# Patient Record
Sex: FEMALE | Race: BLACK OR AFRICAN AMERICAN | NOT HISPANIC OR LATINO | Employment: FULL TIME | URBAN - METROPOLITAN AREA
[De-identification: names, ages, dates, MRNs, and addresses within clinical notes are randomized per-mention and may not be internally consistent; named-entity substitution may affect disease eponyms.]

---

## 2023-06-13 ENCOUNTER — OFFICE VISIT (OUTPATIENT)
Dept: PRIMARY CARE | Facility: CLINIC | Age: 53
End: 2023-06-13
Payer: COMMERCIAL

## 2023-06-13 VITALS
WEIGHT: 201 LBS | HEART RATE: 88 BPM | SYSTOLIC BLOOD PRESSURE: 150 MMHG | BODY MASS INDEX: 35.61 KG/M2 | DIASTOLIC BLOOD PRESSURE: 80 MMHG | HEIGHT: 63 IN

## 2023-06-13 DIAGNOSIS — K21.9 GASTRO-ESOPHAGEAL REFLUX DISEASE WITHOUT ESOPHAGITIS: ICD-10-CM

## 2023-06-13 DIAGNOSIS — E78.2 MIXED HYPERLIPIDEMIA: ICD-10-CM

## 2023-06-13 DIAGNOSIS — K21.00 GASTROESOPHAGEAL REFLUX DISEASE WITH ESOPHAGITIS WITHOUT HEMORRHAGE: ICD-10-CM

## 2023-06-13 DIAGNOSIS — I10 BENIGN ESSENTIAL HYPERTENSION: Primary | ICD-10-CM

## 2023-06-13 DIAGNOSIS — E11.9 DIABETES MELLITUS TYPE 2, DIET-CONTROLLED (MULTI): ICD-10-CM

## 2023-06-13 DIAGNOSIS — I10 BENIGN HYPERTENSION: ICD-10-CM

## 2023-06-13 DIAGNOSIS — M54.50 LOW BACK PAIN, UNSPECIFIED BACK PAIN LATERALITY, UNSPECIFIED CHRONICITY, UNSPECIFIED WHETHER SCIATICA PRESENT: ICD-10-CM

## 2023-06-13 PROBLEM — B96.89 BACTERIAL VAGINOSIS: Status: RESOLVED | Noted: 2023-06-13 | Resolved: 2023-06-13

## 2023-06-13 PROBLEM — R10.9 ABDOMINAL PAIN: Status: ACTIVE | Noted: 2023-06-13

## 2023-06-13 PROBLEM — K91.2 MALNUTRITION FOLLOWING GASTROINTESTINAL SURGERY (HHS-HCC): Status: ACTIVE | Noted: 2023-06-13

## 2023-06-13 PROBLEM — E66.9 OBESITY (BMI 30-39.9): Status: ACTIVE | Noted: 2023-06-13

## 2023-06-13 PROBLEM — K28.9 ULCER, ANASTOMOTIC: Status: ACTIVE | Noted: 2023-06-13

## 2023-06-13 PROBLEM — R39.9 UTI SYMPTOMS: Status: RESOLVED | Noted: 2023-06-13 | Resolved: 2023-06-13

## 2023-06-13 PROBLEM — E78.5 HYPERLIPEMIA: Status: ACTIVE | Noted: 2023-06-13

## 2023-06-13 PROBLEM — G56.00 CARPAL TUNNEL SYNDROME: Status: ACTIVE | Noted: 2023-06-13

## 2023-06-13 PROBLEM — R11.2 NAUSEA AND VOMITING, INTRACTABILITY OF VOMITING NOT SPECIFIED, UNSPECIFIED VOMITING TYPE: Status: ACTIVE | Noted: 2023-06-13

## 2023-06-13 PROBLEM — R05.9 COUGH: Status: ACTIVE | Noted: 2023-06-13

## 2023-06-13 PROBLEM — U07.1 DISEASE DUE TO SEVERE ACUTE RESPIRATORY SYNDROME CORONAVIRUS 2 (SARS-COV-2): Status: ACTIVE | Noted: 2023-06-13

## 2023-06-13 PROBLEM — R10.827: Status: ACTIVE | Noted: 2023-06-13

## 2023-06-13 PROBLEM — M21.40 PES PLANUS: Status: ACTIVE | Noted: 2023-06-13

## 2023-06-13 PROBLEM — M76.829 POSTERIOR TIBIAL TENDONITIS: Status: ACTIVE | Noted: 2023-06-13

## 2023-06-13 PROBLEM — B96.89 BACTERIAL VAGINOSIS: Status: ACTIVE | Noted: 2023-06-13

## 2023-06-13 PROBLEM — K29.40: Status: ACTIVE | Noted: 2023-06-13

## 2023-06-13 PROBLEM — R10.827: Status: RESOLVED | Noted: 2023-06-13 | Resolved: 2023-06-13

## 2023-06-13 PROBLEM — U07.1 DISEASE DUE TO SEVERE ACUTE RESPIRATORY SYNDROME CORONAVIRUS 2 (SARS-COV-2): Status: RESOLVED | Noted: 2023-06-13 | Resolved: 2023-06-13

## 2023-06-13 PROBLEM — F41.8 DEPRESSION WITH ANXIETY: Status: ACTIVE | Noted: 2023-06-13

## 2023-06-13 PROBLEM — G47.00 INSOMNIA: Status: RESOLVED | Noted: 2023-06-13 | Resolved: 2023-06-13

## 2023-06-13 PROBLEM — G62.9 NEUROPATHY: Status: ACTIVE | Noted: 2023-06-13

## 2023-06-13 PROBLEM — R11.0 FEELING QUEASY: Status: ACTIVE | Noted: 2023-06-13

## 2023-06-13 PROBLEM — R03.0 PREHYPERTENSION: Status: ACTIVE | Noted: 2023-06-13

## 2023-06-13 PROBLEM — D72.829 ELEVATED WBC COUNT: Status: RESOLVED | Noted: 2023-06-13 | Resolved: 2023-06-13

## 2023-06-13 PROBLEM — K04.7 DENTAL ABSCESS: Status: ACTIVE | Noted: 2023-06-13

## 2023-06-13 PROBLEM — K28.9 ULCER, ANASTOMOTIC: Status: RESOLVED | Noted: 2023-06-13 | Resolved: 2023-06-13

## 2023-06-13 PROBLEM — D50.9 ANEMIA, IRON DEFICIENCY: Status: ACTIVE | Noted: 2023-06-13

## 2023-06-13 PROBLEM — R11.2 NAUSEA AND VOMITING, INTRACTABILITY OF VOMITING NOT SPECIFIED, UNSPECIFIED VOMITING TYPE: Status: RESOLVED | Noted: 2023-06-13 | Resolved: 2023-06-13

## 2023-06-13 PROBLEM — G47.00 INSOMNIA: Status: ACTIVE | Noted: 2023-06-13

## 2023-06-13 PROBLEM — K04.7 DENTAL ABSCESS: Status: RESOLVED | Noted: 2023-06-13 | Resolved: 2023-06-13

## 2023-06-13 PROBLEM — F41.9 ANXIETY: Status: ACTIVE | Noted: 2023-06-13

## 2023-06-13 PROBLEM — R05.9 COUGH: Status: RESOLVED | Noted: 2023-06-13 | Resolved: 2023-06-13

## 2023-06-13 PROBLEM — E55.9 VITAMIN D DEFICIENCY: Status: ACTIVE | Noted: 2023-06-13

## 2023-06-13 PROBLEM — J20.9 ACUTE BRONCHITIS WITH BRONCHOSPASM: Status: RESOLVED | Noted: 2023-06-13 | Resolved: 2023-06-13

## 2023-06-13 PROBLEM — J20.9 ACUTE BRONCHITIS WITH BRONCHOSPASM: Status: ACTIVE | Noted: 2023-06-13

## 2023-06-13 PROBLEM — N76.0 BACTERIAL VAGINOSIS: Status: RESOLVED | Noted: 2023-06-13 | Resolved: 2023-06-13

## 2023-06-13 PROBLEM — Z98.84 BARIATRIC SURGERY STATUS: Status: ACTIVE | Noted: 2023-06-13

## 2023-06-13 PROBLEM — D72.829 ELEVATED WBC COUNT: Status: ACTIVE | Noted: 2023-06-13

## 2023-06-13 PROBLEM — R10.9 ABDOMINAL PAIN: Status: RESOLVED | Noted: 2023-06-13 | Resolved: 2023-06-13

## 2023-06-13 PROBLEM — N76.0 BACTERIAL VAGINOSIS: Status: ACTIVE | Noted: 2023-06-13

## 2023-06-13 PROBLEM — R39.9 UTI SYMPTOMS: Status: ACTIVE | Noted: 2023-06-13

## 2023-06-13 PROBLEM — R11.0 FEELING QUEASY: Status: RESOLVED | Noted: 2023-06-13 | Resolved: 2023-06-13

## 2023-06-13 LAB
POC FINGERSTICK BLOOD GLUCOSE: 123 MG/DL (ref 70–100)
POC HEMOGLOBIN A1C: 6.3 % (ref 4.2–6.5)

## 2023-06-13 PROCEDURE — 3051F HG A1C>EQUAL 7.0%<8.0%: CPT | Performed by: INTERNAL MEDICINE

## 2023-06-13 PROCEDURE — 99396 PREV VISIT EST AGE 40-64: CPT | Performed by: INTERNAL MEDICINE

## 2023-06-13 PROCEDURE — 93000 ELECTROCARDIOGRAM COMPLETE: CPT | Performed by: INTERNAL MEDICINE

## 2023-06-13 PROCEDURE — 3079F DIAST BP 80-89 MM HG: CPT | Performed by: INTERNAL MEDICINE

## 2023-06-13 PROCEDURE — 83036 HEMOGLOBIN GLYCOSYLATED A1C: CPT | Performed by: INTERNAL MEDICINE

## 2023-06-13 PROCEDURE — 3077F SYST BP >= 140 MM HG: CPT | Performed by: INTERNAL MEDICINE

## 2023-06-13 PROCEDURE — 3008F BODY MASS INDEX DOCD: CPT | Performed by: INTERNAL MEDICINE

## 2023-06-13 PROCEDURE — 4010F ACE/ARB THERAPY RXD/TAKEN: CPT | Performed by: INTERNAL MEDICINE

## 2023-06-13 PROCEDURE — 82962 GLUCOSE BLOOD TEST: CPT | Performed by: INTERNAL MEDICINE

## 2023-06-13 RX ORDER — ATORVASTATIN CALCIUM 20 MG/1
20 TABLET, FILM COATED ORAL DAILY
Qty: 30 TABLET | Refills: 5 | Status: SHIPPED | OUTPATIENT
Start: 2023-06-13 | End: 2023-07-12

## 2023-06-13 RX ORDER — CYCLOBENZAPRINE HCL 10 MG
10 TABLET ORAL NIGHTLY PRN
Qty: 30 TABLET | Refills: 0 | Status: SHIPPED | OUTPATIENT
Start: 2023-06-13 | End: 2023-08-12

## 2023-06-13 RX ORDER — LOSARTAN POTASSIUM 25 MG/1
25 TABLET ORAL DAILY
Qty: 30 TABLET | Refills: 11 | Status: SHIPPED | OUTPATIENT
Start: 2023-06-13 | End: 2023-07-12

## 2023-06-13 RX ORDER — OMEPRAZOLE 40 MG/1
40 CAPSULE, DELAYED RELEASE ORAL DAILY
COMMUNITY
End: 2023-06-19

## 2023-06-13 RX ORDER — EMPAGLIFLOZIN 10 MG/1
10 TABLET, FILM COATED ORAL DAILY
COMMUNITY
Start: 2023-02-13 | End: 2023-11-30 | Stop reason: SDUPTHER

## 2023-06-13 SDOH — HEALTH STABILITY: PHYSICAL HEALTH: ON AVERAGE, HOW MANY MINUTES DO YOU ENGAGE IN EXERCISE AT THIS LEVEL?: 0 MIN

## 2023-06-13 SDOH — HEALTH STABILITY: PHYSICAL HEALTH: ON AVERAGE, HOW MANY DAYS PER WEEK DO YOU ENGAGE IN MODERATE TO STRENUOUS EXERCISE (LIKE A BRISK WALK)?: 0 DAYS

## 2023-06-13 ASSESSMENT — PROMIS GLOBAL HEALTH SCALE
RATE_SOCIAL_SATISFACTION: VERY GOOD
RATE_PHYSICAL_HEALTH: GOOD
RATE_MENTAL_HEALTH: GOOD
EMOTIONAL_PROBLEMS: SOMETIMES
CARRYOUT_PHYSICAL_ACTIVITIES: MODERATELY
RATE_GENERAL_HEALTH: GOOD
RATE_QUALITY_OF_LIFE: GOOD
RATE_AVERAGE_FATIGUE: MILD
CARRYOUT_SOCIAL_ACTIVITIES: VERY GOOD
RATE_AVERAGE_PAIN: 3

## 2023-06-13 ASSESSMENT — LIFESTYLE VARIABLES
HOW MANY STANDARD DRINKS CONTAINING ALCOHOL DO YOU HAVE ON A TYPICAL DAY: 1 OR 2
HOW OFTEN DO YOU HAVE SIX OR MORE DRINKS ON ONE OCCASION: NEVER
AUDIT-C TOTAL SCORE: 1
HOW OFTEN DO YOU HAVE A DRINK CONTAINING ALCOHOL: MONTHLY OR LESS
SKIP TO QUESTIONS 9-10: 1

## 2023-06-13 ASSESSMENT — PATIENT HEALTH QUESTIONNAIRE - PHQ9
SUM OF ALL RESPONSES TO PHQ9 QUESTIONS 1 & 2: 0
1. LITTLE INTEREST OR PLEASURE IN DOING THINGS: NOT AT ALL
2. FEELING DOWN, DEPRESSED OR HOPELESS: NOT AT ALL

## 2023-06-13 ASSESSMENT — ENCOUNTER SYMPTOMS: BACK PAIN: 1

## 2023-06-13 NOTE — PROGRESS NOTES
"Subjective   Patient ID: Yuni Jordan is a 53 y.o. female who presents for No chief complaint on file..    Patient presents for physical exam.  She ran out of Jardiance.  She has been compliant with her diet and exercise.  She has been complaining of increased stress in her life.  She continues to complain of occasional right-sided.  She denies any headaches, no dizziness, does complain of allergy symptoms.  She denies any chest pain or shortness of breath, but does complain of occasional reflux symptoms.  She is also complaining of excess gas and feeling full quickly.  She denies any constipation or diarrhea.  She continues to smoke black in miles per day         Review of Systems   Musculoskeletal:  Positive for back pain.       Objective   /80   Pulse 88   Ht 1.6 m (5' 3\")   Wt 91.2 kg (201 lb)   BMI 35.61 kg/m²     Physical Exam  Constitutional:       General: She is not in acute distress.     Appearance: Normal appearance. She is normal weight. She is not ill-appearing, toxic-appearing or diaphoretic.   HENT:      Head: Normocephalic and atraumatic.      Right Ear: Tympanic membrane, ear canal and external ear normal. There is no impacted cerumen.      Left Ear: Tympanic membrane, ear canal and external ear normal. There is no impacted cerumen.      Nose: Nose normal. No congestion or rhinorrhea.      Mouth/Throat:      Mouth: Mucous membranes are moist.      Pharynx: Oropharynx is clear. No oropharyngeal exudate or posterior oropharyngeal erythema.   Eyes:      General: No scleral icterus.        Right eye: No discharge.         Left eye: No discharge.      Extraocular Movements: Extraocular movements intact.      Conjunctiva/sclera: Conjunctivae normal.      Pupils: Pupils are equal, round, and reactive to light.   Neck:      Vascular: No carotid bruit.   Cardiovascular:      Rate and Rhythm: Normal rate and regular rhythm.      Pulses: Normal pulses.      Heart sounds: Normal heart sounds. No " murmur heard.     No friction rub. No gallop.   Pulmonary:      Effort: Pulmonary effort is normal. No respiratory distress.      Breath sounds: Normal breath sounds. No stridor. No wheezing, rhonchi or rales.   Chest:      Chest wall: No tenderness.   Abdominal:      General: Abdomen is flat. Bowel sounds are normal. There is no distension.      Palpations: Abdomen is soft. There is no mass.      Tenderness: There is no abdominal tenderness. There is no right CVA tenderness, left CVA tenderness or guarding.      Hernia: No hernia is present.   Musculoskeletal:         General: No swelling, tenderness, deformity or signs of injury.      Cervical back: Normal range of motion and neck supple. No rigidity or tenderness.      Right lower leg: No edema.      Left lower leg: No edema.   Lymphadenopathy:      Cervical: No cervical adenopathy.   Skin:     Coloration: Skin is not jaundiced or pale.      Findings: No bruising, erythema, lesion or rash.   Neurological:      Mental Status: She is alert.      Cranial Nerves: No cranial nerve deficit.      Sensory: No sensory deficit.      Motor: No weakness.      Coordination: Coordination normal.      Gait: Gait normal.      Deep Tendon Reflexes: Reflexes normal.   Psychiatric:         Mood and Affect: Mood normal.         Behavior: Behavior normal.         Thought Content: Thought content normal.         Assessment/Plan         Diagnoses and all orders for this visit:  Benign essential hypertension-start losartan.  BMP in a few weeks.  -     losartan (Cozaar) 25 mg tablet; Take 1 tablet (25 mg) by mouth once daily.  -     ECG 12 lead  Diabetes mellitus type 2, diet-controlled (CMS/[facility])  -We will restart Jardiance.  -     POCT Fingerstick Glucose manually resulted  -     POCT glycosylated hemoglobin (Hb A1C) manually resulted  Gastroesophageal reflux disease with esophagitis without hemorrhage-refer to GI for further evaluation  Low back pain, unspecified back pain laterality,  unspecified chronicity, unspecified whether sciatica present-she will take flexerol prn  Mixed hyperlipidemia-we will start atorvastatin.  Lipids at next visit  -     atorvastatin (Lipitor) 20 mg tablet; Take 1 tablet (20 mg) by mouth once daily.  Health maintenance-she will schedule a mammogram.  Colonoscopy has been done.  Refuses immunizations.  Abdominal pain and early satiety-refer to GI for further evaluation.  Obesity-she is encouraged to diet and exercise  Tobacco abuse-total cessation strongly encouraged.  She is down to a few black and milds per day

## 2023-06-13 NOTE — PATIENT INSTRUCTIONS
Please schedule appointment with GI.  Take medication as prescribed.  Follow-up in 1 month.  Diet and exercise.  Quit smoking.  Schedule your mammogram

## 2023-06-19 RX ORDER — OMEPRAZOLE 40 MG/1
CAPSULE, DELAYED RELEASE ORAL
Qty: 90 CAPSULE | Refills: 2 | Status: SHIPPED | OUTPATIENT
Start: 2023-06-19

## 2023-07-05 DIAGNOSIS — E78.2 MIXED HYPERLIPIDEMIA: ICD-10-CM

## 2023-07-05 DIAGNOSIS — I10 BENIGN ESSENTIAL HYPERTENSION: ICD-10-CM

## 2023-07-12 RX ORDER — ATORVASTATIN CALCIUM 20 MG/1
TABLET, FILM COATED ORAL
Qty: 30 TABLET | Refills: 5 | Status: SHIPPED | OUTPATIENT
Start: 2023-07-12 | End: 2024-01-25

## 2023-07-12 RX ORDER — LOSARTAN POTASSIUM 25 MG/1
TABLET ORAL
Qty: 30 TABLET | Refills: 11 | Status: SHIPPED | OUTPATIENT
Start: 2023-07-12 | End: 2023-09-05 | Stop reason: DRUGHIGH

## 2023-08-07 ENCOUNTER — APPOINTMENT (OUTPATIENT)
Dept: PRIMARY CARE | Facility: CLINIC | Age: 53
End: 2023-08-07
Payer: COMMERCIAL

## 2023-09-05 ENCOUNTER — OFFICE VISIT (OUTPATIENT)
Dept: PRIMARY CARE | Facility: CLINIC | Age: 53
End: 2023-09-05
Payer: COMMERCIAL

## 2023-09-05 VITALS
BODY MASS INDEX: 34.15 KG/M2 | WEIGHT: 192.8 LBS | HEART RATE: 72 BPM | TEMPERATURE: 97.1 F | SYSTOLIC BLOOD PRESSURE: 138 MMHG | DIASTOLIC BLOOD PRESSURE: 86 MMHG

## 2023-09-05 DIAGNOSIS — J32.9 SINUSITIS, UNSPECIFIED CHRONICITY, UNSPECIFIED LOCATION: ICD-10-CM

## 2023-09-05 DIAGNOSIS — E78.2 MIXED HYPERLIPIDEMIA: ICD-10-CM

## 2023-09-05 DIAGNOSIS — I10 BENIGN ESSENTIAL HYPERTENSION: ICD-10-CM

## 2023-09-05 DIAGNOSIS — Z72.0 TOBACCO ABUSE: ICD-10-CM

## 2023-09-05 DIAGNOSIS — E66.9 OBESITY (BMI 30-39.9): ICD-10-CM

## 2023-09-05 DIAGNOSIS — E11.9 DIABETES MELLITUS TYPE 2, DIET-CONTROLLED (MULTI): ICD-10-CM

## 2023-09-05 DIAGNOSIS — Z12.31 SCREENING MAMMOGRAM, ENCOUNTER FOR: Primary | ICD-10-CM

## 2023-09-05 LAB
POC FINGERSTICK BLOOD GLUCOSE: 88 MG/DL (ref 70–100)
POC HEMOGLOBIN A1C: 6.2 % (ref 4.2–6.5)

## 2023-09-05 PROCEDURE — 3051F HG A1C>EQUAL 7.0%<8.0%: CPT | Performed by: INTERNAL MEDICINE

## 2023-09-05 PROCEDURE — 3075F SYST BP GE 130 - 139MM HG: CPT | Performed by: INTERNAL MEDICINE

## 2023-09-05 PROCEDURE — 3079F DIAST BP 80-89 MM HG: CPT | Performed by: INTERNAL MEDICINE

## 2023-09-05 PROCEDURE — 3008F BODY MASS INDEX DOCD: CPT | Performed by: INTERNAL MEDICINE

## 2023-09-05 PROCEDURE — 99214 OFFICE O/P EST MOD 30 MIN: CPT | Performed by: INTERNAL MEDICINE

## 2023-09-05 PROCEDURE — 83036 HEMOGLOBIN GLYCOSYLATED A1C: CPT | Performed by: INTERNAL MEDICINE

## 2023-09-05 PROCEDURE — 4010F ACE/ARB THERAPY RXD/TAKEN: CPT | Performed by: INTERNAL MEDICINE

## 2023-09-05 PROCEDURE — 82962 GLUCOSE BLOOD TEST: CPT | Performed by: INTERNAL MEDICINE

## 2023-09-05 RX ORDER — LOSARTAN POTASSIUM 50 MG/1
50 TABLET ORAL DAILY
Qty: 90 TABLET | Refills: 3 | Status: SHIPPED | OUTPATIENT
Start: 2023-09-05 | End: 2024-09-04

## 2023-09-05 ASSESSMENT — ENCOUNTER SYMPTOMS
TROUBLE SWALLOWING: 0
DIAPHORESIS: 0
SINUS PRESSURE: 0
FATIGUE: 0
COUGH: 0
WEAKNESS: 0
APPETITE CHANGE: 0
NECK STIFFNESS: 0
COLOR CHANGE: 0
EYE REDNESS: 0
LIGHT-HEADEDNESS: 0
POLYDIPSIA: 0
ACTIVITY CHANGE: 0
BACK PAIN: 1
SHORTNESS OF BREATH: 0
DIARRHEA: 0
DYSURIA: 0
POLYPHAGIA: 0
NECK PAIN: 0
EYE DISCHARGE: 0
VOMITING: 0
PHOTOPHOBIA: 0
TREMORS: 0
SINUS PAIN: 0
SORE THROAT: 1
JOINT SWELLING: 0
RECTAL PAIN: 0
NAUSEA: 0
NUMBNESS: 0
DIZZINESS: 0
VOICE CHANGE: 0
WHEEZING: 0
CHEST TIGHTNESS: 0
STRIDOR: 0
CHILLS: 0
EYE ITCHING: 0
CONSTIPATION: 0
HEADACHES: 0
WOUND: 0
FREQUENCY: 0
EYE PAIN: 0
FACIAL ASYMMETRY: 0
RHINORRHEA: 0
ANAL BLEEDING: 0
FLANK PAIN: 0
HEMATURIA: 0
CHOKING: 0
DIFFICULTY URINATING: 0
BLOOD IN STOOL: 0
ADENOPATHY: 0
ABDOMINAL DISTENTION: 0
BRUISES/BLEEDS EASILY: 0
MYALGIAS: 0
SLEEP DISTURBANCE: 0
ARTHRALGIAS: 0
ABDOMINAL PAIN: 1
FACIAL SWELLING: 0
PALPITATIONS: 0
SPEECH DIFFICULTY: 0
SEIZURES: 0

## 2023-09-05 NOTE — PROGRESS NOTES
Subjective   Patient ID: Yuni Jordan is a 53 y.o. female who presents for Follow-up and Sore Throat (With ear pain ).    Patient presents for follow-up.  She has been compliant with her medications, diet but not exercise.  She has lost weight.  She is grieving over the death of her mother.  She is complaining of a 2-week long history of a mild sore throat.  She denies any fever or chills.  She denies any sinus drainage.  She denies any headaches, no dizziness, no chest pain or shortness of breath.  She reports baseline abdominal pain no nausea vomiting or diarrhea..  Her GERD symptoms have been stable she reports baseline  back pain.    Sore Throat   Associated symptoms include abdominal pain. Pertinent negatives include no congestion, coughing, diarrhea, drooling, ear discharge, ear pain, headaches, neck pain, shortness of breath, stridor, trouble swallowing or vomiting.     URI-    Review of Systems   Constitutional:  Negative for activity change, appetite change, chills, diaphoresis and fatigue.   HENT:  Positive for sore throat. Negative for congestion, dental problem, drooling, ear discharge, ear pain, facial swelling, hearing loss, mouth sores, nosebleeds, postnasal drip, rhinorrhea, sinus pressure, sinus pain, sneezing, tinnitus, trouble swallowing and voice change.    Eyes:  Negative for photophobia, pain, discharge, redness, itching and visual disturbance.   Respiratory:  Negative for cough, choking, chest tightness, shortness of breath, wheezing and stridor.    Cardiovascular:  Negative for chest pain, palpitations and leg swelling.   Gastrointestinal:  Positive for abdominal pain. Negative for abdominal distention, anal bleeding, blood in stool, constipation, diarrhea, nausea, rectal pain and vomiting.   Endocrine: Negative for cold intolerance, heat intolerance, polydipsia, polyphagia and polyuria.   Genitourinary:  Negative for decreased urine volume, difficulty urinating, dysuria, enuresis, flank  pain, frequency, genital sores, hematuria, menstrual problem and urgency.   Musculoskeletal:  Positive for back pain. Negative for arthralgias, gait problem, joint swelling, myalgias, neck pain and neck stiffness.   Skin:  Negative for color change, pallor, rash and wound.   Neurological:  Negative for dizziness, tremors, seizures, syncope, facial asymmetry, speech difficulty, weakness, light-headedness, numbness and headaches.   Hematological:  Negative for adenopathy. Does not bruise/bleed easily.   Psychiatric/Behavioral:  Negative for sleep disturbance.        Objective   /86   Pulse 72   Temp 36.2 °C (97.1 °F) (Temporal)   Wt 87.5 kg (192 lb 12.8 oz)   BMI 34.15 kg/m²     Physical Exam  Constitutional:       Appearance: Normal appearance.   Cardiovascular:      Rate and Rhythm: Normal rate and regular rhythm.      Heart sounds: No murmur heard.     No gallop.   Pulmonary:      Effort: No respiratory distress.      Breath sounds: No wheezing or rales.   Abdominal:      General: There is no distension.      Palpations: There is no mass.      Tenderness: There is no abdominal tenderness. There is no guarding.   Musculoskeletal:      Right lower leg: No edema.      Left lower leg: No edema.   Neurological:      Mental Status: She is alert.         Assessment/Plan   Diagnoses and all orders for this visit:  Screening mammogram, encounter for  -     BI mammo bilateral screening tomosynthesis; Future  Benign essential hypertension-increase losartan to 50 mg daily.  Follow low-salt diet exercise  -     losartan (Cozaar) 50 mg tablet; Take 1 tablet (50 mg) by mouth once daily.  -     Basic Metabolic Panel; Future  -     Lipid Panel; Future  -     Alanine Aminotransferase; Future  -     Aspartate Aminotansferase; Future  Mixed hyperlipidemia-check a fast lipid profile  GERD-she will keep appointment with GI.   Diabetes mellitus type 2, d-check a fast lipid profile iet-controlled (CMS/[facility])  Obesity (BMI  30-39.9)-schedule appointment with gastric surgery clinic..  Tobacco abuse-diet and exercise-total cessation strongly encouraged.  She is down to a few black and milds per day  Health maintenance-we will schedule mammogram.  Colonoscopy has been done.  Refuses immunizations  Abdominal pain-she will keep appointment with GI on Thursday  URI-she will take a COVID test.  She will call me tomorrow with results.

## 2023-09-05 NOTE — PATIENT INSTRUCTIONS
Please take medication as prescribed.  Follow-up in 3 months.  Diet and exercise.  Obtain fasting blood work.  Keep appointment with GI.  Schedule your mammogram.

## 2023-09-06 RX ORDER — AZITHROMYCIN 250 MG/1
TABLET, FILM COATED ORAL
Qty: 6 TABLET | Refills: 0 | Status: SHIPPED | OUTPATIENT
Start: 2023-09-06 | End: 2023-09-11

## 2023-09-30 DIAGNOSIS — K21.00 GASTROESOPHAGEAL REFLUX DISEASE WITH ESOPHAGITIS WITHOUT HEMORRHAGE: Primary | ICD-10-CM

## 2023-10-02 RX ORDER — PANTOPRAZOLE SODIUM 40 MG/1
40 TABLET, DELAYED RELEASE ORAL DAILY
Qty: 30 TABLET | Refills: 3 | Status: SHIPPED | OUTPATIENT
Start: 2023-10-02

## 2023-10-03 PROBLEM — E11.9 DIABETES MELLITUS (MULTI): Status: ACTIVE | Noted: 2023-10-03

## 2023-10-03 RX ORDER — LISINOPRIL 5 MG/1
1 TABLET ORAL DAILY
COMMUNITY

## 2023-10-03 RX ORDER — UBIDECARENONE 75 MG
1 CAPSULE ORAL DAILY
COMMUNITY

## 2023-10-03 RX ORDER — SUCRALFATE 1 G/10ML
10 SUSPENSION ORAL 4 TIMES DAILY
COMMUNITY
Start: 2021-05-27

## 2023-10-03 RX ORDER — ONDANSETRON 4 MG/1
4 TABLET, ORALLY DISINTEGRATING ORAL EVERY 8 HOURS PRN
COMMUNITY
Start: 2021-03-17

## 2023-10-03 RX ORDER — ESOMEPRAZOLE MAGNESIUM 40 MG/1
1 CAPSULE, DELAYED RELEASE ORAL DAILY
COMMUNITY
Start: 2023-08-10

## 2023-10-03 RX ORDER — TOPIRAMATE 25 MG/1
TABLET ORAL
COMMUNITY
Start: 2021-03-17

## 2023-10-03 RX ORDER — ERGOCALCIFEROL 1.25 MG/1
1 CAPSULE ORAL WEEKLY
COMMUNITY
Start: 2019-07-23

## 2023-10-03 RX ORDER — TIRZEPATIDE 2.5 MG/.5ML
2.5 INJECTION, SOLUTION SUBCUTANEOUS
COMMUNITY
Start: 2023-02-13

## 2023-10-03 RX ORDER — BUDESONIDE AND FORMOTEROL FUMARATE DIHYDRATE 160; 4.5 UG/1; UG/1
2 AEROSOL RESPIRATORY (INHALATION) 2 TIMES DAILY
COMMUNITY
Start: 2021-10-01

## 2023-10-03 RX ORDER — ALBUTEROL SULFATE 90 UG/1
1-2 AEROSOL, METERED RESPIRATORY (INHALATION)
COMMUNITY
Start: 2019-10-24

## 2023-10-06 ENCOUNTER — OFFICE VISIT (OUTPATIENT)
Dept: GASTROENTEROLOGY | Facility: EXTERNAL LOCATION | Age: 53
End: 2023-10-06
Payer: COMMERCIAL

## 2023-10-06 DIAGNOSIS — R11.2 NAUSEA AND VOMITING, UNSPECIFIED VOMITING TYPE: ICD-10-CM

## 2023-10-06 DIAGNOSIS — K21.00 GASTROESOPHAGEAL REFLUX DISEASE WITH ESOPHAGITIS WITHOUT HEMORRHAGE: ICD-10-CM

## 2023-10-06 DIAGNOSIS — K29.70 GASTRITIS WITHOUT BLEEDING, UNSPECIFIED CHRONICITY, UNSPECIFIED GASTRITIS TYPE: Primary | ICD-10-CM

## 2023-10-06 DIAGNOSIS — K21.9 GASTRO-ESOPHAGEAL REFLUX DISEASE WITHOUT ESOPHAGITIS: ICD-10-CM

## 2023-10-06 DIAGNOSIS — R10.13 ABDOMINAL PAIN, EPIGASTRIC: ICD-10-CM

## 2023-10-06 DIAGNOSIS — E11.9 TYPE 2 DIABETES MELLITUS WITHOUT COMPLICATION, WITHOUT LONG-TERM CURRENT USE OF INSULIN (MULTI): ICD-10-CM

## 2023-10-06 PROCEDURE — 43239 EGD BIOPSY SINGLE/MULTIPLE: CPT | Performed by: INTERNAL MEDICINE

## 2023-10-06 PROCEDURE — 88305 TISSUE EXAM BY PATHOLOGIST: CPT

## 2023-10-06 PROCEDURE — 3008F BODY MASS INDEX DOCD: CPT | Performed by: INTERNAL MEDICINE

## 2023-10-06 PROCEDURE — 3051F HG A1C>EQUAL 7.0%<8.0%: CPT | Performed by: INTERNAL MEDICINE

## 2023-10-06 PROCEDURE — 88305 TISSUE EXAM BY PATHOLOGIST: CPT | Performed by: STUDENT IN AN ORGANIZED HEALTH CARE EDUCATION/TRAINING PROGRAM

## 2023-10-06 PROCEDURE — 4010F ACE/ARB THERAPY RXD/TAKEN: CPT | Performed by: INTERNAL MEDICINE

## 2023-10-10 ENCOUNTER — ANCILLARY PROCEDURE (OUTPATIENT)
Dept: RADIOLOGY | Facility: CLINIC | Age: 53
End: 2023-10-10
Payer: COMMERCIAL

## 2023-10-10 ENCOUNTER — LAB REQUISITION (OUTPATIENT)
Dept: LAB | Facility: HOSPITAL | Age: 53
End: 2023-10-10
Payer: COMMERCIAL

## 2023-10-10 VITALS — HEIGHT: 63 IN | BODY MASS INDEX: 34.16 KG/M2

## 2023-10-10 DIAGNOSIS — Z12.31 ENCOUNTER FOR SCREENING MAMMOGRAM FOR MALIGNANT NEOPLASM OF BREAST: ICD-10-CM

## 2023-10-10 PROCEDURE — 77063 BREAST TOMOSYNTHESIS BI: CPT | Mod: BILATERAL PROCEDURE | Performed by: RADIOLOGY

## 2023-10-10 PROCEDURE — 77067 SCR MAMMO BI INCL CAD: CPT | Mod: BILATERAL PROCEDURE | Performed by: RADIOLOGY

## 2023-10-10 PROCEDURE — 77063 BREAST TOMOSYNTHESIS BI: CPT | Mod: 50

## 2023-10-19 LAB
LABORATORY COMMENT REPORT: NORMAL
PATH REPORT.FINAL DX SPEC: NORMAL
PATH REPORT.GROSS SPEC: NORMAL
PATH REPORT.RELEVANT HX SPEC: NORMAL
PATH REPORT.TOTAL CANCER: NORMAL

## 2023-11-30 DIAGNOSIS — E11.9 DIABETES MELLITUS TYPE 2, DIET-CONTROLLED (MULTI): Primary | ICD-10-CM

## 2023-11-30 RX ORDER — EMPAGLIFLOZIN 10 MG/1
10 TABLET, FILM COATED ORAL DAILY
Qty: 30 TABLET | Refills: 1 | Status: SHIPPED | OUTPATIENT
Start: 2023-11-30 | End: 2024-05-02

## 2024-01-25 DIAGNOSIS — E78.2 MIXED HYPERLIPIDEMIA: ICD-10-CM

## 2024-01-25 RX ORDER — ATORVASTATIN CALCIUM 20 MG/1
TABLET, FILM COATED ORAL
Qty: 90 TABLET | Refills: 1 | Status: SHIPPED | OUTPATIENT
Start: 2024-01-25

## 2024-03-03 ENCOUNTER — HOSPITAL ENCOUNTER (OUTPATIENT)
Dept: RADIOLOGY | Facility: CLINIC | Age: 54
Discharge: HOME | End: 2024-03-03
Payer: COMMERCIAL

## 2024-03-03 DIAGNOSIS — M79.671 RIGHT FOOT PAIN: ICD-10-CM

## 2024-03-03 PROCEDURE — 73630 X-RAY EXAM OF FOOT: CPT | Mod: RT

## 2024-03-03 PROCEDURE — 73630 X-RAY EXAM OF FOOT: CPT | Mod: RIGHT SIDE | Performed by: RADIOLOGY

## 2024-03-05 ENCOUNTER — APPOINTMENT (OUTPATIENT)
Dept: ORTHOPEDIC SURGERY | Facility: CLINIC | Age: 54
End: 2024-03-05
Payer: COMMERCIAL

## 2024-03-05 ENCOUNTER — OFFICE VISIT (OUTPATIENT)
Dept: ORTHOPEDIC SURGERY | Facility: HOSPITAL | Age: 54
End: 2024-03-05
Payer: COMMERCIAL

## 2024-03-05 DIAGNOSIS — S99.191A FRACTURE OF BASE OF FIFTH METATARSAL BONE OF RIGHT FOOT AT METAPHYSEAL-DIAPHYSEAL JUNCTION, CLOSED, INITIAL ENCOUNTER: Primary | ICD-10-CM

## 2024-03-05 PROCEDURE — L4361 PNEUMA/VAC WALK BOOT PRE OTS: HCPCS | Performed by: FAMILY MEDICINE

## 2024-03-05 PROCEDURE — 99204 OFFICE O/P NEW MOD 45 MIN: CPT | Performed by: FAMILY MEDICINE

## 2024-03-05 PROCEDURE — 3008F BODY MASS INDEX DOCD: CPT | Performed by: FAMILY MEDICINE

## 2024-03-05 PROCEDURE — 28470 CLTX METATARSAL FX WO MNP EA: CPT | Performed by: FAMILY MEDICINE

## 2024-03-05 PROCEDURE — 99214 OFFICE O/P EST MOD 30 MIN: CPT | Mod: 57 | Performed by: FAMILY MEDICINE

## 2024-03-05 PROCEDURE — 28470 CLTX METATARSAL FX WO MNP EA: CPT | Mod: RT | Performed by: FAMILY MEDICINE

## 2024-03-05 PROCEDURE — 4010F ACE/ARB THERAPY RXD/TAKEN: CPT | Performed by: FAMILY MEDICINE

## 2024-03-05 RX ORDER — TRAMADOL HYDROCHLORIDE 50 MG/1
50 TABLET ORAL EVERY 6 HOURS PRN
Qty: 8 TABLET | Refills: 0 | Status: SHIPPED | OUTPATIENT
Start: 2024-03-05 | End: 2024-03-07

## 2024-03-05 RX ORDER — MELOXICAM 15 MG/1
15 TABLET ORAL DAILY
Qty: 30 TABLET | Refills: 1 | Status: SHIPPED | OUTPATIENT
Start: 2024-03-05 | End: 2024-05-04

## 2024-03-05 NOTE — LETTER
March 5, 2024     Jorje Kapadia MD  [address] [facility] [address] [facility] [address]    Patient: Yuni Jordan   YOB: 1970   Date of Visit: 3/5/2024       Dear Dr. Jorje Kapadia MD:    Thank you for referring Yuni Jordan to me for evaluation. Below are my notes for this consultation.  If you have questions, please do not hesitate to call me. I look forward to following your patient along with you.       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    On 3/2/2024 she was going up a few stairs from her garage into her house while texting, hit the dorsum lateral right foot on the edge of the stair and a plantarflexion and inversion injury and tripped and fell forward.  She tried to go to the urgent care on that day but it was closing, so she went to urgent care the next day where x-rays were taken and she was given a postop shoe and given crutches.  Since then she complains of 8 out of 10 pain, with tramadol not helping her at all.  She has a trip to [place] on 3/13.  Of note, NSAIDs give her GI upset.    Review of Systems:  Constitutional: Negative.  Head and face: Negative.    Eyes: Negative.  Ears, Nose, Mouth, and Throat: Negative.  Neck: Negative.  Pulmonary: Negative.  Cardiovascular: Negative.  Skin and subcutaneous tissue: Negative.  Neurologic: Negative.  Psychiatric: Negative.  MSK: foot pain    Physical Examination:     The Right ankle and foot is without obvious signs of acute bony deformity, erythema or ecchymosis. Significant swelling lateral ankle and foot. Significant tenderness over the 4th and 5th metatarsals. There is no tenderness to the medial joint line. There is no tenderness to the lateral joint line. There is no bony crepitus or  step-off. Active range of motion is limited and painful. Passive range of motion is limited and painful. Strength is weak due to pain compared to the opposite ankle. The opposite ankle is otherwise normal and stable. Gait is antalgic and crutch assisted.    Constitutional: General appearance = Alert and in no acute distress. Well developed, well nourished.   Head and face: Normal.    Eyes: External Eye, Conjunctiva and Lids=Normal external exam; pupils were equal in size, round, reactive to light (PERRL) and extraocular movements intact (EOMI).   Ears, Nose, Mouth, and Throat: External inspection of ears and nose=Normal.   Hearing= Normal.    Neck: No neck mass was observed. Supple.   Pulmonary: Respiratory effort = No respiratory distress.   Cardiovascular: Examination of extremities= No peripheral edema.   Skin and subcutaneous tissue: Normal skin color and pigmentation, normal skin turgor, and no rash; palpation of skin and subcutaneous tissue=Normal.    Neurologic: Sensation= Normal.    Psychiatric: Judgment and insight= Intact.  Orientation to person, place, and time: Alert and oriented x 3.  Mood and affect= Normal.    Imaging:  Radiographs of the right foot from  were reviewed and revealed zone 3 base of the fifth metatarsal fracture of the right foot.  The studies were reviewed by Dr. Sofia in the office today.    === 03/03/24 ===    XR FOOT 3+ VIEWS RIGHT    - Impression -  Minimally displaced/nondisplaced proximal 5th metatarsal fracture  (Perry fracture).      MACRO:  None    Signed by: Javier Contreras 3/3/2024 3:56 PM  Dictation workstation:   [id number]    Problem List Items Addressed This Visit    None  Visit Diagnoses         Codes    Fracture of base of fifth metatarsal bone of right foot at metaphyseal-diaphyseal junction, closed, initial encounter    -  Primary S99.191A    Relevant Medications    meloxicam (Mobic) 15 mg tablet    traMADol (Ultram) 50 mg tablet    Other Relevant Orders    Knee  Walker    Walking Boot Short            Assessment and Plan:     FRACTURE CARE    We reviewed the exam and x-ray findings and discussed the conservative and surgical treatment options. The patient presents with a nondisplaced fracture of the base of the right fifth metatarsal that does not require surgical manipulation at this time. We will manage the injury nonsurgically and without manipulation. We agreed to prescribe a short walking boot for comfort and a knee scooter.  She was educated not to bear weight on it, at most she can toe-touch for balance while using crutches.  Meloxicam and tramadol prescribed for pain control.  She will return in 4 weeks for repeat x-ray and follow-up.    Patient was prescribed a short walking boot for fracture of the base of the right fifth metatarsal. The patient is ambulatory with or without aid; but, has weakness, instability and/or deformity of their right foot which requires stabilization from this orthosis to improve their function.      Verbal and written instructions for the use, wear schedule, cleaning and application of this item were given.  Patient was instructed that should the brace result in increased pain, decreased sensation, increased swelling, or an overall worsening of their medical condition, to please contact our office immediately.     Orthotic management and training was provided for skin care, modifications due to healing tissues, edema changes, interruption in skin integrity, and safety precautions with the orthosis.    **This note was dictated using Dragon speech recognition software and was not corrected for spelling or grammatical errors**.     Alonso Martinez DO  Sports Medicine Fellow  [facility] [place]    Attestation signed by Alejo Sofia MD at 3/5/2024  6:06 PM:    Attending Note     Trainee role: Fellow    Trainee discussed patient with Dr. Sofia          I saw and evaluated the patient. I personally obtained the  key and critical portions of the history and physical exam or was physically present for key and critical portions performed by the trainee. I reviewed the trainee's documentation and discussed the patient with the trainee. I agree with the trainee's medical decision making, as documented on the trainee's note.         Alejo Sofia MD  Sports Medicine Specialist  [facility] [place]

## 2024-03-05 NOTE — PROGRESS NOTES
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    On 3/2/2024 she was going up a few stairs from her garage into her house while texting, hit the dorsum lateral right foot on the edge of the stair and a plantarflexion and inversion injury and tripped and fell forward.  She tried to go to the urgent care on that day but it was closing, so she went to urgent care the next day where x-rays were taken and she was given a postop shoe and given crutches.  Since then she complains of 8 out of 10 pain, with tramadol not helping her at all.  She has a trip to [place] on 3/13.  Of note, NSAIDs give her GI upset.    Review of Systems:  Constitutional: Negative.  Head and face: Negative.    Eyes: Negative.  Ears, Nose, Mouth, and Throat: Negative.  Neck: Negative.  Pulmonary: Negative.  Cardiovascular: Negative.  Skin and subcutaneous tissue: Negative.  Neurologic: Negative.  Psychiatric: Negative.  MSK: foot pain    Physical Examination:     The Right ankle and foot is without obvious signs of acute bony deformity, erythema or ecchymosis. Significant swelling lateral ankle and foot. Significant tenderness over the 4th and 5th metatarsals. There is no tenderness to the medial joint line. There is no tenderness to the lateral joint line. There is no bony crepitus or step-off. Active range of motion is limited and painful. Passive range of motion is limited and painful. Strength is weak due to pain compared to the opposite ankle. The opposite ankle is otherwise normal and stable. Gait is antalgic and crutch assisted.    Constitutional: General appearance = Alert and in no acute distress. Well developed, well nourished.   Head and face: Normal.    Eyes: External Eye, Conjunctiva and Lids=Normal external exam; pupils were equal in size, round, reactive to light (PERRL) and extraocular movements intact (EOMI).   Ears, Nose,  Mouth, and Throat: External inspection of ears and nose=Normal.   Hearing= Normal.    Neck: No neck mass was observed. Supple.   Pulmonary: Respiratory effort = No respiratory distress.   Cardiovascular: Examination of extremities= No peripheral edema.   Skin and subcutaneous tissue: Normal skin color and pigmentation, normal skin turgor, and no rash; palpation of skin and subcutaneous tissue=Normal.    Neurologic: Sensation= Normal.    Psychiatric: Judgment and insight= Intact.  Orientation to person, place, and time: Alert and oriented x 3.  Mood and affect= Normal.    Imaging:  Radiographs of the right foot from  were reviewed and revealed zone 3 base of the fifth metatarsal fracture of the right foot.  The studies were reviewed by Dr. Sofia in the office today.    === 03/03/24 ===    XR FOOT 3+ VIEWS RIGHT    - Impression -  Minimally displaced/nondisplaced proximal 5th metatarsal fracture  (Perry fracture).      MACRO:  None    Signed by: Javier Contreras 3/3/2024 3:56 PM  Dictation workstation:   [id number]    Problem List Items Addressed This Visit    None  Visit Diagnoses         Codes    Fracture of base of fifth metatarsal bone of right foot at metaphyseal-diaphyseal junction, closed, initial encounter    -  Primary S99.191A    Relevant Medications    meloxicam (Mobic) 15 mg tablet    traMADol (Ultram) 50 mg tablet    Other Relevant Orders    Knee Walker    Walking Boot Short            Assessment and Plan:     FRACTURE CARE    We reviewed the exam and x-ray findings and discussed the conservative and surgical treatment options. The patient presents with a nondisplaced fracture of the base of the right fifth metatarsal that does not require surgical manipulation at this time. We will manage the injury nonsurgically and without manipulation. We agreed to prescribe a short walking boot for comfort and a knee scooter.  She was educated not to bear weight on it, at most she can toe-touch for balance while  using crutches.  Meloxicam and tramadol prescribed for pain control.  She will return in 4 weeks for repeat x-ray and follow-up.    Patient was prescribed a short walking boot for fracture of the base of the right fifth metatarsal. The patient is ambulatory with or without aid; but, has weakness, instability and/or deformity of their right foot which requires stabilization from this orthosis to improve their function.      Verbal and written instructions for the use, wear schedule, cleaning and application of this item were given.  Patient was instructed that should the brace result in increased pain, decreased sensation, increased swelling, or an overall worsening of their medical condition, to please contact our office immediately.     Orthotic management and training was provided for skin care, modifications due to healing tissues, edema changes, interruption in skin integrity, and safety precautions with the orthosis.    **This note was dictated using Dragon speech recognition software and was not corrected for spelling or grammatical errors**.     Alonso Martinez DO  Sports Medicine Fellow  [facility] [place]

## 2024-03-05 NOTE — LETTER
March 5, 2024     Patient: Yuni Jordan   YOB: 1970   Date of Visit: 3/5/2024       To Whom It May Concern:    It is my medical opinion that Yuni Jordan may return to work on Monday, March 11, 2024 .  She has a right foot fracture and will be nonweightbearing for a minimum of 6-8 weeks.  Please accommodate her at work as needed.    If you have any questions or concerns, please don't hesitate to call.         Sincerely,        Alejo Sofia MD    CC: No Recipients

## 2024-04-05 ENCOUNTER — HOSPITAL ENCOUNTER (OUTPATIENT)
Dept: RADIOLOGY | Facility: HOSPITAL | Age: 54
Discharge: HOME | End: 2024-04-05
Payer: COMMERCIAL

## 2024-04-05 ENCOUNTER — OFFICE VISIT (OUTPATIENT)
Dept: ORTHOPEDIC SURGERY | Facility: HOSPITAL | Age: 54
End: 2024-04-05
Payer: COMMERCIAL

## 2024-04-05 DIAGNOSIS — S99.191A FRACTURE OF BASE OF FIFTH METATARSAL BONE OF RIGHT FOOT AT METAPHYSEAL-DIAPHYSEAL JUNCTION, CLOSED, INITIAL ENCOUNTER: ICD-10-CM

## 2024-04-05 PROCEDURE — 99024 POSTOP FOLLOW-UP VISIT: CPT | Performed by: FAMILY MEDICINE

## 2024-04-05 PROCEDURE — 73630 X-RAY EXAM OF FOOT: CPT | Mod: RIGHT SIDE | Performed by: RADIOLOGY

## 2024-04-05 PROCEDURE — 73630 X-RAY EXAM OF FOOT: CPT | Mod: RT

## 2024-04-05 PROCEDURE — 4010F ACE/ARB THERAPY RXD/TAKEN: CPT | Performed by: FAMILY MEDICINE

## 2024-04-05 PROCEDURE — 3008F BODY MASS INDEX DOCD: CPT | Performed by: FAMILY MEDICINE

## 2024-04-05 NOTE — PROGRESS NOTES
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    On 3/2/2024 she was going up a few stairs from her garage into her house while texting, hit the dorsum lateral right foot on the edge of the stair and a plantarflexion and inversion injury and tripped and fell forward.  She tried to go to the urgent care on that day but it was closing, so she went to urgent care the next day where x-rays were taken and she was given a postop shoe and given crutches.  Since then she complains of 8 out of 10 pain, with tramadol not helping her at all.  She has a trip to [place] on 3/13.  Of note, NSAIDs give her GI upset. The patient presents with a nondisplaced fracture of the base of the right fifth metatarsal that does not require surgical manipulation at this time. We will manage the injury nonsurgically and without manipulation. We agreed to prescribe a short walking boot for comfort and a knee scooter.  She was educated not to bear weight on it, at most she can toe-touch for balance while using crutches.  Meloxicam and tramadol prescribed for pain control.  She will return in 4 weeks for repeat x-ray and follow-up.    Today, 4/5/2024, she returns for 4-week follow-up of right fifth metatarsal base fracture.  States that she has maintained nonweightbearing status since last visit, notes that pain is significantly improved from last visit.  Denies interval injury or trauma to the foot.    Physical Examination:     The Right ankle and foot is without obvious signs of acute bony deformity, erythema or ecchymosis.  Mild swelling of the lateral ankle and foot, improved from previous exam.  No significant tenderness over the 5th metatarsal.     Imaging:  Radiographs of the right foot obtained today were reviewed and revealed zone 3 base of the fifth metatarsal fracture of the right foot.  The studies were reviewed by Dr. Sofia in the  office today.      Problem List Items Addressed This Visit    None  Visit Diagnoses         Codes    Fracture of base of fifth metatarsal bone of right foot at metaphyseal-diaphyseal junction, closed, initial encounter     S99.191A    Relevant Orders    XR foot right 3+ views            Assessment and Plan:     FRACTURE CARE    We reviewed the exam and x-ray findings and discussed the conservative and surgical treatment options.  We agreed to continue treating her nonsurgically since she is doing well with her Perry fracture.  She will be 5 weeks out from injury tomorrow.  We discussed that we will have her stay nonweightbearing for 1 more week, then she can progress to weightbearing as tolerated in the boot, which she will remain in until follow-up.  She will follow-up in 5 weeks for repeat radiographs of the right foot.    **This note was dictated using Dragon speech recognition software and was not corrected for spelling or grammatical errors**.

## 2024-05-02 DIAGNOSIS — E11.9 DIABETES MELLITUS TYPE 2, DIET-CONTROLLED (MULTI): ICD-10-CM

## 2024-05-02 RX ORDER — EMPAGLIFLOZIN 10 MG/1
10 TABLET, FILM COATED ORAL DAILY
Qty: 30 TABLET | Refills: 1 | Status: SHIPPED | OUTPATIENT
Start: 2024-05-02

## 2024-05-10 ENCOUNTER — OFFICE VISIT (OUTPATIENT)
Dept: ORTHOPEDIC SURGERY | Facility: HOSPITAL | Age: 54
End: 2024-05-10
Payer: COMMERCIAL

## 2024-05-10 ENCOUNTER — HOSPITAL ENCOUNTER (OUTPATIENT)
Dept: RADIOLOGY | Facility: HOSPITAL | Age: 54
Discharge: HOME | End: 2024-05-10
Payer: COMMERCIAL

## 2024-05-10 DIAGNOSIS — S99.191A FRACTURE OF BASE OF FIFTH METATARSAL BONE OF RIGHT FOOT AT METAPHYSEAL-DIAPHYSEAL JUNCTION, CLOSED, INITIAL ENCOUNTER: ICD-10-CM

## 2024-05-10 PROCEDURE — 73630 X-RAY EXAM OF FOOT: CPT | Mod: RIGHT SIDE | Performed by: RADIOLOGY

## 2024-05-10 PROCEDURE — 73630 X-RAY EXAM OF FOOT: CPT | Mod: RT

## 2024-05-10 PROCEDURE — 4010F ACE/ARB THERAPY RXD/TAKEN: CPT | Performed by: FAMILY MEDICINE

## 2024-05-10 PROCEDURE — 99024 POSTOP FOLLOW-UP VISIT: CPT | Performed by: FAMILY MEDICINE

## 2024-05-10 PROCEDURE — 3008F BODY MASS INDEX DOCD: CPT | Performed by: FAMILY MEDICINE

## 2024-05-10 NOTE — PROGRESS NOTES
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    On 3/5/2024, she presented for evaluation of a right foot fracture.  On 3/2/2024 she was going up a few stairs from her garage into her house while texting, hit the dorsum lateral right foot on the edge of the stair and a plantarflexion and inversion injury and tripped and fell forward.  She tried to go to the urgent care on that day but it was closing, so she went to urgent care the next day where x-rays were taken and she was given a postop shoe and given crutches.  Since then she complains of 8 out of 10 pain, with tramadol not helping her at all.  She has a trip to [place] on 3/13.  Of note, NSAIDs give her GI upset. The patient presents with a nondisplaced fracture of the base of the right fifth metatarsal that does not require surgical manipulation at this time. We will manage the injury nonsurgically and without manipulation. We agreed to prescribe a short walking boot for comfort and a knee scooter.  She was educated not to bear weight on it, at most she can toe-touch for balance while using crutches.  Meloxicam and tramadol prescribed for pain control.  She will return in 4 weeks for repeat x-ray and follow-up.    Today, 4/5/2024, she returns for 4-week follow-up of right fifth metatarsal fracture.  States that she has maintained nonweightbearing status since last visit, notes that pain is significantly improved from last visit.  Denies interval injury or trauma to the foot.  We agreed to continue treating her nonsurgically since she is doing well with her Perry fracture.  She will be 5 weeks out from injury tomorrow.  We discussed that we will have her stay nonweightbearing for 1 more week, then she can progress to weightbearing as tolerated in the boot, which she will remain in until  follow-up.  She will follow-up in 5 weeks for repeat radiographs of the right foot.    Today, 5/10/2024, she returns for 1 month follow-up of a Perry fracture to the right fifth metatarsal from an injury on 3/2/2024.  She is doing well in the boot.  She gets some mild popping at the ankle when she walks but otherwise she is pain-free.  She denies interval injury or trauma.    Physical Examination:     The Right ankle and foot is without obvious signs of acute bony deformity, erythema or ecchymosis.  No swelling of the lateral ankle and foot, improved from previous exam.  No significant tenderness over the 5th metatarsal.     Imaging:  Radiographs of the right foot obtained today revealed good interval healing of a Perry fracture to the fifth proximal metatarsal as compared to previous x-rays from last 2 visits.  The studies were reviewed by me personally in the office today.    === 04/05/24 ===  XR FOOT 3+ VIEWS RIGHT  - Impression -  1. Mild interval healing noted of a nondisplaced Perry type fracture  of the 5th metatarsal base with slight increase in amount of bridging  callus. No malalignment between the fracture fragments.  Signed by: Hanane Motta 4/6/2024 9:59 AM    Problem List Items Addressed This Visit    None  Visit Diagnoses         Codes    Fracture of base of fifth metatarsal bone of right foot at metaphyseal-diaphyseal junction, closed, initial encounter     S99.191A    Relevant Orders    XR foot right 3+ views            Assessment and Plan:     FRACTURE CARE    We reviewed the exam and x-ray findings and discussed the conservative and surgical treatment options.  We agreed to continue treating her nonsurgically since she is doing well with her Perry fracture.  She will continue the boot walker with 2 weeks of weightbearing as tolerated ambulation.  At that time she can progress to regular shoes with good arch supports for another 2 weeks.  She was given a handout of home rehab exercises for the  ankle.  She will follow-up in 4 weeks for soraida-ray.    **This note was dictated using Dragon speech recognition software and was not corrected for spelling or grammatical errors**.    Alejo Sofia MD  Sports Medicine Specialist  [facility] [place]

## 2024-06-14 ENCOUNTER — OFFICE VISIT (OUTPATIENT)
Dept: ORTHOPEDIC SURGERY | Facility: HOSPITAL | Age: 54
End: 2024-06-14
Payer: COMMERCIAL

## 2024-06-14 ENCOUNTER — HOSPITAL ENCOUNTER (OUTPATIENT)
Dept: RADIOLOGY | Facility: HOSPITAL | Age: 54
Discharge: HOME | End: 2024-06-14
Payer: COMMERCIAL

## 2024-06-14 DIAGNOSIS — M76.71 PERONEAL TENDINITIS OF RIGHT LOWER EXTREMITY: ICD-10-CM

## 2024-06-14 DIAGNOSIS — S99.191A FRACTURE OF BASE OF FIFTH METATARSAL BONE OF RIGHT FOOT AT METAPHYSEAL-DIAPHYSEAL JUNCTION, CLOSED, INITIAL ENCOUNTER: ICD-10-CM

## 2024-06-14 DIAGNOSIS — S99.191D: Primary | ICD-10-CM

## 2024-06-14 PROCEDURE — 99213 OFFICE O/P EST LOW 20 MIN: CPT | Performed by: FAMILY MEDICINE

## 2024-06-14 PROCEDURE — 4010F ACE/ARB THERAPY RXD/TAKEN: CPT | Performed by: FAMILY MEDICINE

## 2024-06-14 PROCEDURE — 73630 X-RAY EXAM OF FOOT: CPT | Mod: RT

## 2024-06-14 ASSESSMENT — PAIN - FUNCTIONAL ASSESSMENT: PAIN_FUNCTIONAL_ASSESSMENT: 0-10

## 2024-06-14 ASSESSMENT — PAIN SCALES - GENERAL: PAINLEVEL_OUTOF10: 6

## 2024-06-14 NOTE — PROGRESS NOTES
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    On 3/5/2024, she presented for evaluation of a right foot fracture.  On 3/2/2024 she was going up a few stairs from her garage into her house while texting, hit the dorsum lateral right foot on the edge of the stair and a plantarflexion and inversion injury and tripped and fell forward.  She tried to go to the urgent care on that day but it was closing, so she went to urgent care the next day where x-rays were taken and she was given a postop shoe and given crutches.  Since then she complains of 8 out of 10 pain, with tramadol not helping her at all.  She has a trip to [place] on 3/13.  Of note, NSAIDs give her GI upset. The patient presents with a nondisplaced fracture of the base of the right fifth metatarsal that does not require surgical manipulation at this time. We will manage the injury nonsurgically and without manipulation. We agreed to prescribe a short walking boot for comfort and a knee scooter.  She was educated not to bear weight on it, at most she can toe-touch for balance while using crutches.  Meloxicam and tramadol prescribed for pain control.  She will return in 4 weeks for repeat x-ray and follow-up.    On 4/5/2024, she returns for 4-week follow-up of right fifth metatarsal fracture.  States that she has maintained nonweightbearing status since last visit, notes that pain is significantly improved from last visit.  Denies interval injury or trauma to the foot.  We agreed to continue treating her nonsurgically since she is doing well with her Perry fracture.  She will be 5 weeks out from injury tomorrow.  We discussed that we will have her stay nonweightbearing for 1 more week, then she can progress to weightbearing as tolerated in the boot, which she will remain in until follow-up.   She will follow-up in 5 weeks for repeat radiographs of the right foot.    On 5/10/2024, she returns for 1 month follow-up of a Perry fracture to the right fifth metatarsal from an injury on 3/2/2024.  She is doing well in the boot.  She gets some mild popping at the ankle when she walks but otherwise she is pain-free.  She denies interval injury or trauma. We agreed to continue treating her nonsurgically since she is doing well with her Perry fracture.  She will continue the boot walker with 2 weeks of weightbearing as tolerated ambulation.  At that time she can progress to regular shoes with good arch supports for another 2 weeks.  She was given a handout of home rehab exercises for the ankle.  She will follow-up in 4 weeks for re x-ray.    Today, 6/14/2024, she returns for 4-week follow-up of right fifth metatarsal fracture.  States that she has been overall feeling well.  Has been wearing shoes for the last few weeks.  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  States that she has been having increased pain with this increased activity.  However, has no pain at fracture site.    Physical Examination:     The Right ankle and foot is without obvious signs of acute bony deformity, erythema or ecchymosis.  No swelling of the lateral ankle and foot.  No tenderness over the 5th metatarsal.  Tenderness to palpation over the peroneal tendons.    Imaging:  Radiographs of the right foot obtained today revealed good interval healing of a Perry fracture to the fifth proximal metatarsal as compared to previous.  The studies were reviewed by me personally in the office today.    Problem List Items Addressed This Visit             ICD-10-CM    Fracture of base of fifth metatarsal bone of right foot at metaphyseal-diaphyseal junction, with routine healing, subsequent encounter - Primary S99.191D    Relevant Orders     XR foot right 3+ views    Referral to Physical Therapy    Peroneal tendinitis of right lower extremity M76.71    Relevant Orders    Referral to Physical Therapy       Assessment and Plan:    We reviewed the exam and x-ray findings and discussed the conservative and surgical treatment options.  We agreed that her fracture is healing very well.  She has been overdoing things, and has developed notable peroneal tendinitis.  At this point, we will send her to formal physical therapy to work on this.  She will follow-up as needed if her pain does not improve.    **This note was dictated using Dragon speech recognition software and was not corrected for spelling or grammatical errors**.

## 2024-06-14 NOTE — LETTER
June 14, 2024     Patient: Yuni Jordan   YOB: 1970   Date of Visit: 6/14/2024       To Whom It May Concern:    It is my medical opinion that Yuni Jordan  is recovering from a recent foot fracture and has been immobilized for over 3 months using crutches.  She is unable to run, jump, or climb.  Therefore, she is unable to perform her current work duties for this temporary, summer job but she should be back to 100% recovery by the end of July. .    If you have any questions or concerns, please don't hesitate to call.         Sincerely,        Alejo Sofia MD    CC: No Recipients

## 2024-07-22 ENCOUNTER — EVALUATION (OUTPATIENT)
Dept: PHYSICAL THERAPY | Facility: CLINIC | Age: 54
End: 2024-07-22
Payer: COMMERCIAL

## 2024-07-22 DIAGNOSIS — S99.191D: ICD-10-CM

## 2024-07-22 DIAGNOSIS — M76.71 PERONEAL TENDINITIS OF RIGHT LOWER EXTREMITY: ICD-10-CM

## 2024-07-22 PROCEDURE — 97161 PT EVAL LOW COMPLEX 20 MIN: CPT | Mod: GP | Performed by: PHYSICAL THERAPIST

## 2024-07-22 PROCEDURE — 97110 THERAPEUTIC EXERCISES: CPT | Mod: GP | Performed by: PHYSICAL THERAPIST

## 2024-07-22 ASSESSMENT — ENCOUNTER SYMPTOMS
DEPRESSION: 1
LOSS OF SENSATION IN FEET: 0
OCCASIONAL FEELINGS OF UNSTEADINESS: 0

## 2024-07-22 NOTE — PROGRESS NOTES
Physical Therapy    Physical Therapy Evaluation and Treatment      Patient Name: Yuni Jordan  MRN: [id number]  Today's Date: 7/22/2024  Visit: 1  Insurance: Reviewed  Physician: Alejo Sofia  Problem List Items Addressed This Visit             ICD-10-CM    Fracture of base of fifth metatarsal bone of right foot at metaphyseal-diaphyseal junction, with routine healing, subsequent encounter S99.191D    Relevant Orders    Follow Up In Physical Therapy    Peroneal tendinitis of right lower extremity M76.71    Relevant Orders    Follow Up In Physical Therapy        Time Entry:   Time Calculation  Start Time: 1110  Stop Time: 1150  Time Calculation (min): 40 min  PT Evaluation Time Entry  PT Evaluation (Low) Time Entry: 30    Assessment: Patient seen in PT for Initial Evaluation for foot pain secondary to Perry fx,  and PT.   Patient presents with postural deviation  decreased ankle ROM , decreased ankle strength, gait deviation tenderness, and decreased balance on right.  Functionally, patient  unable to walk long distances, and does steps one at a time.  Patient rates LEFS at 41.25%%.    PT Assessment  Rehab Prognosis: Good  Evaluation/Treatment Tolerance: Patient tolerated treatment well     Plan:  Continue with POC  Scifit stepper/bike, ankle strength/ROM, balance/proprioception, GT, and CKC    OP PT Plan  PT Plan: Skilled PT  PT Frequency: 1 time per week  Duration: 6  Onset Date: 06/14/24  Rehab Potential: Good  Plan of Care Agreement: Patient    Current Problem:   1. Fracture of base of fifth metatarsal bone of right foot at metaphyseal-diaphyseal junction, with routine healing, subsequent encounter  Referral to Physical Therapy    Follow Up In Physical Therapy      2. Peroneal tendinitis of right lower extremity  Referral to Physical Therapy    Follow Up In Physical Therapy          Subjective    General: Patient with a history of right Perry fx after she banged foot on [place] - did not fall.  [facility] - wrapped up and went to ortho - cast 3 days later.  In cast for 2 months +.  Patient complains of pain in the region of the lateral ankle and little toe, stiff - ache to sharp pain, 6/10 at worst last 7 days.  Patient's pain is worse with standing and walking.  Functionally, patient is unable to walk fast, difficulty with steps- doing one at a time, not able to walk greater then 10 minutes.  Works as career counselor-mostly sitting.        Precautions: HTN, asthma, fall risk  Precautions  STEADI Fall Risk Score (The score of 4 or more indicates an increased risk of falling): 7  Prior Level of Function: no limits       Objective     Posture: pronation  LE ROM: ankle ROM: DF 12, PF 50, IN 25 EV 15  LE strength: right ankle strength 4/5  Gait: decreased heel strike and toe off left   Balance: 4 sec SLS on right, 10 sec+ on left  Min swelling lateral ankle  Tender lateral ankle, and proximal 5th digit    Outcome Measures:  Other Measures  Lower Extremity Funtional Score (LEFS): 41.25%     Treatments:  Patient instructed in HEP including: ankle DF/PF 20X, ankle IN/EV 20X, alphabet X1, calf stretch with strap 10X and resisted     EDUCATION: HEP        Goals:  Active       PT Problem       PT Goal 1       Start:  07/22/24    Expected End:  10/20/24         Goal Note       Foot and ankle pain 3/10 or less              PT Goal 2       Start:  07/22/24    Expected End:  10/20/24         Goal Note       Normal gait               PT Goal 3       Start:  07/22/24    Expected End:  10/20/24         Goal Note       Maximize ankle ROM  5/5 ankle strength               PT Goal 4       Start:  07/22/24    Expected End:  10/20/24         Goal Note       SLS 10 sec +

## 2024-08-08 ENCOUNTER — TREATMENT (OUTPATIENT)
Dept: PHYSICAL THERAPY | Facility: CLINIC | Age: 54
End: 2024-08-08
Payer: COMMERCIAL

## 2024-08-08 DIAGNOSIS — S99.191D: ICD-10-CM

## 2024-08-08 DIAGNOSIS — M76.71 PERONEAL TENDINITIS OF RIGHT LOWER EXTREMITY: ICD-10-CM

## 2024-08-08 PROCEDURE — 97110 THERAPEUTIC EXERCISES: CPT | Mod: GP | Performed by: PHYSICAL THERAPIST

## 2024-08-08 NOTE — PROGRESS NOTES
Physical Therapy    Physical Therapy Treatment    Patient Name: Yuni Jordan  MRN: [id number]  Today's Date: 8/8/2024    Time Entry:   Time Calculation  Start Time: 1115  Stop Time: 1145  Time Calculation (min): 30 min  PT Therapeutic Procedures Time Entry  Therapeutic Exercise Time Entry: 10  PT Modalities Time Entry  Hot/Cold Pack Time Entry: 10      Assessment:  Patient with increased pain since Saturday.  Patient advised to use boot intermittently to avoid sharp pain with walking, continue HEP, ice frequently and follow up with Dr Sofia secondary to severe pain to 10/10.     Plan:  Continue with POC       Current Problem  1. Fracture of base of fifth metatarsal bone of right foot at metaphyseal-diaphyseal junction, with routine healing, subsequent encounter  Follow Up In Physical Therapy      2. Peroneal tendinitis of right lower extremity  Follow Up In Physical Therapy          General      Patient with increased pain after walking for 20 minutes during the day on Saturday and then sitting all night at a function on Saturday night - difficulty walking after getting up from chair on Saturday night.   Pain 10/10 at worst last 2 days    Subjective    Precautions: HTN, asthma, fall risk        Pain       Objective     Independent with HEP  Mild lateral ankle swelling, tender over ATFL and over 5th metatarsal  Sharp pain and guarding with ROM       Treatments:  SciFit stepper 4 minutes- patient had to stop after 4 minutes due to increased foot - ankle pain  Reviewed HEP verbally - patient refused to do exercises secondary to pain  Patient advised to wear boot at home intermittently to avoid sharp pains with walking  (10 minutes)    CP to the left foot and ankle 10 minutes    OP EDUCATION: reviewed HEP verbally       Goals:  Active       PT Problem       PT Goal 1       Start:  07/22/24    Expected End:  10/20/24         Goal Note       Foot and ankle pain 3/10 or less              PT Goal 2       Start:  07/22/24     Expected End:  10/20/24         Goal Note       Normal gait               PT Goal 3       Start:  07/22/24    Expected End:  10/20/24         Goal Note       Maximize ankle ROM  5/5 ankle strength               PT Goal 4       Start:  07/22/24    Expected End:  10/20/24         Goal Note       SLS 10 sec +

## 2024-08-13 ENCOUNTER — APPOINTMENT (OUTPATIENT)
Dept: PHYSICAL THERAPY | Facility: CLINIC | Age: 54
End: 2024-08-13
Payer: COMMERCIAL

## 2024-08-13 ENCOUNTER — DOCUMENTATION (OUTPATIENT)
Dept: PHYSICAL THERAPY | Facility: CLINIC | Age: 54
End: 2024-08-13
Payer: COMMERCIAL

## 2024-08-13 NOTE — PROGRESS NOTES
Physical Therapy                 Therapy Communication Note    Patient Name: Yuni Jordan  MRN: [id number]  Today's Date: 8/13/2024     Discipline: Physical Therapy    Missed Visit Reason:  canceled, work conflict    Missed Time: Cancel    Comment:

## 2024-08-20 ENCOUNTER — DOCUMENTATION (OUTPATIENT)
Dept: PHYSICAL THERAPY | Facility: CLINIC | Age: 54
End: 2024-08-20
Payer: COMMERCIAL

## 2024-08-20 ENCOUNTER — APPOINTMENT (OUTPATIENT)
Dept: PHYSICAL THERAPY | Facility: CLINIC | Age: 54
End: 2024-08-20
Payer: COMMERCIAL

## 2024-08-20 NOTE — PROGRESS NOTES
Physical Therapy                 Therapy Communication Note    Patient Name: Yuni Jordan  MRN: [id number]  Today's Date: 8/20/2024     Discipline: Physical Therapy    Missed Visit Reason:  canceled, wishes to attend therapy elsewhere    Missed Time: Cancel    Comment:

## 2024-08-29 ENCOUNTER — APPOINTMENT (OUTPATIENT)
Dept: PHYSICAL THERAPY | Facility: CLINIC | Age: 54
End: 2024-08-29
Payer: COMMERCIAL

## 2024-09-16 ENCOUNTER — OFFICE VISIT (OUTPATIENT)
Dept: ORTHOPEDIC SURGERY | Facility: HOSPITAL | Age: 54
End: 2024-09-16
Payer: COMMERCIAL

## 2024-09-16 ENCOUNTER — HOSPITAL ENCOUNTER (OUTPATIENT)
Dept: RADIOLOGY | Facility: HOSPITAL | Age: 54
Discharge: HOME | End: 2024-09-16
Payer: COMMERCIAL

## 2024-09-16 DIAGNOSIS — M25.571 RIGHT ANKLE PAIN, UNSPECIFIED CHRONICITY: ICD-10-CM

## 2024-09-16 DIAGNOSIS — M25.571 SINUS TARSI SYNDROME OF RIGHT FOOT: ICD-10-CM

## 2024-09-16 DIAGNOSIS — M76.821 POSTERIOR TIBIAL TENDON DYSFUNCTION, RIGHT: Primary | ICD-10-CM

## 2024-09-16 DIAGNOSIS — M76.821 POSTERIOR TIBIAL TENDINITIS, RIGHT: ICD-10-CM

## 2024-09-16 PROCEDURE — 73610 X-RAY EXAM OF ANKLE: CPT | Mod: RIGHT SIDE | Performed by: RADIOLOGY

## 2024-09-16 PROCEDURE — L4361 PNEUMA/VAC WALK BOOT PRE OTS: HCPCS | Performed by: ORTHOPAEDIC SURGERY

## 2024-09-16 PROCEDURE — 99214 OFFICE O/P EST MOD 30 MIN: CPT | Performed by: ORTHOPAEDIC SURGERY

## 2024-09-16 PROCEDURE — 73610 X-RAY EXAM OF ANKLE: CPT | Mod: RT

## 2024-09-16 PROCEDURE — 4010F ACE/ARB THERAPY RXD/TAKEN: CPT | Performed by: ORTHOPAEDIC SURGERY

## 2024-09-16 ASSESSMENT — PAIN - FUNCTIONAL ASSESSMENT: PAIN_FUNCTIONAL_ASSESSMENT: 0-10

## 2024-09-16 NOTE — PROGRESS NOTES
This is a pleasant 54 y.o. year old female who presents for fuv of the right foot and ankle.  Interventions: Had PT, does some of exercises on her own, did step aerobics classes and flared up ankle and notes pain also along posterior tibial tendon and sinus tarsi pain, increased pain with walking, not able to wear her fashion shoes  Hx of bariatric surgery, no longer diabetic     PHYSICAL EXAMINATION  Constitutional Exam: patient's height and weight reviewed, well-kempt  Psychiatric Exam: alert and oriented x 3, appropriate mood and behavior  Eye Exam: STEPHANIE, EOMI  Pulmonary Exam: breathing non-labored, no apparent distress  Lymphatic exam: no appreciable lymphadenopathy in the lower extremities  Cardiovascular exam: DP pulses 2+ bilaterally, PT 2+ bilaterally, toes are pink with good capillary refill, no pitting edema  Skin exam: no open lesions, rashes, abrasions or ulcerations  Neurological exam: sensation to light touch intact in both lower extremities in peripheral and dermatomal distributions (except for any abnormalities noted in musculoskeletal exam)    Musculoskeletal exam: right foot and ankle: pain to palpation over sinus tarsi and milder pain over posterior tibial tendon, able to invert past midline, increased hindfoot valgus on standing, tight achilles, no pain over 5th metatarsal    DATA/RESULTS REVIEW: I personally reviewed the patient's x-ray images and reports of the  right ankle showing mild subtalar valgus, no significant arthrosis, minimal midfoot sag, healed 5th MT rosenbaum fracture .     ASSESSMENT: posterior tibial tendon dysfunction and posterior tibial tendinitis with secondary sinus tarsi impingement, achilles tightness right  PLAN: Discussed with the patient the complex degenerative and overuse and posttraumatic related nature of her condition and options for treatment.  She has posterior tibial tendon dysfunction with increased hindfoot valgus which causes lateral foot overload and also can  cause impingement or pinching over the sinus Tarsi where she has most of her pain today.  There are no abnormalities of the soft tissue or bone in that area.  She has no pain over the peroneal tendons fortunately.  We discussed with her that proper shoewear long-term is really important to protect the posterior tibial tendon tendinopathy from worsening to the point where it is a fixed flatfoot.  We also would like to avoid surgical intervention.  She has a lot of fashion shoes at home and we discussed with her what she is to be more healthy for her feet long-term.  2 inch high-heeled shoes are very detrimental to her feet and cause excessive overload in the foot and arch area.  We also discussed with her that step aerobics is a very high impact activity that overworks the posterior tibial tendon and so we would not recommend that activity at this time.  Instead we would have her do stationary bike, pool therapy or elliptical or cardio and then work up to a walking program as discussed.  Due to her significant pain today in the sinus Tarsi and in the posterior tibial tendon we would recommend going into a tall cam walker boot weightbearing for 3 to 6 weeks and then weaning into a good orthotic insert.  She knows her physical therapy exercises and so she will do them on her own.  She would like to avoid further physical therapy.  Anti-inflammatories or use of Voltaren gel also an option for pain relief.  Gave her a shoe wear list with some brands she can consider.  Instructed her on a stretching program for the Achilles which is tight and can make the deformity worse over time.  The patient was given a prescription for custom-made orthotics, which are medically necessary due to the patient's medical condition and symptomatic posterior tibial tendon dysfunction and required for medial-lateral stability.  The patient is ambulatory.  Duration will be greater than 6 months.  The patient's questions were answered in  detail.      Patient was prescribed a tall cam walker boot brace for posterior tibial tendinitis, sinus Tarsi impingement, posterior tibial tendon dysfunction problem.  The patient is ambulatory with or without aid; but, has weakness, instability and/or deformity of their right lower extremity which requires stabilization from this orthosis to improve their function.      Verbal and written instructions for the use, wear schedule, cleaning and application of this item were given.  Patient was instructed that should the brace result in increased pain, decreased sensation, increased swelling, or an overall worsening of their medical condition, to please contact our office immediately.     Orthotic management and training was provided for skin care, modifications due to healing tissues, edema changes, interruption in skin integrity, and safety precautions with the orthosis.      Note dictated with [employer] software, completed without full type editing to avoid delay.

## 2024-12-05 ENCOUNTER — DOCUMENTATION (OUTPATIENT)
Dept: PHYSICAL THERAPY | Facility: CLINIC | Age: 54
End: 2024-12-05
Payer: COMMERCIAL

## 2024-12-05 NOTE — PROGRESS NOTES
Physical Therapy    Discharge Summary    Name: Yuni Jordan  MRN: [id number]  : 1970  Date: 24    Discharge Summary: PT    Discharge Information: Date of discharge 24, Date of last visit 24, Date of evaluation 24, Number of attended visits 2, Referred by Alejo Sofia, and Referred for fracture of base of fifth metatarsal.    Therapy Summary: Patient seen in PT for 2 visits.  Patient did not follow up in PT after her second PT visit.     Discharge Status: Status unknown.     Rehab Discharge Reason: Failed to schedule and/or keep follow-up appointment(s)

## 2025-03-11 ENCOUNTER — OFFICE VISIT (OUTPATIENT)
Dept: URGENT CARE | Age: 55
End: 2025-03-11
Payer: COMMERCIAL

## 2025-03-11 VITALS
HEART RATE: 84 BPM | SYSTOLIC BLOOD PRESSURE: 166 MMHG | DIASTOLIC BLOOD PRESSURE: 90 MMHG | RESPIRATION RATE: 16 BRPM | OXYGEN SATURATION: 96 % | TEMPERATURE: 98.6 F

## 2025-03-11 DIAGNOSIS — I10 HYPERTENSION, UNCONTROLLED: ICD-10-CM

## 2025-03-11 DIAGNOSIS — K11.21 ACUTE SIALOADENITIS: Primary | ICD-10-CM

## 2025-03-11 DIAGNOSIS — F17.210 CIGARETTE NICOTINE DEPENDENCE WITHOUT COMPLICATION: ICD-10-CM

## 2025-03-11 DIAGNOSIS — J02.9 SORE THROAT: ICD-10-CM

## 2025-03-11 LAB — POC RAPID STREP: NEGATIVE

## 2025-03-11 RX ORDER — SULFAMETHOXAZOLE AND TRIMETHOPRIM 800; 160 MG/1; MG/1
1 TABLET ORAL 2 TIMES DAILY
Qty: 14 TABLET | Refills: 0 | Status: SHIPPED | OUTPATIENT
Start: 2025-03-11 | End: 2025-03-18

## 2025-03-11 ASSESSMENT — ENCOUNTER SYMPTOMS: SORE THROAT: 1

## 2025-03-11 ASSESSMENT — PAIN SCALES - GENERAL: PAINLEVEL_OUTOF10: 10-WORST PAIN EVER

## 2025-03-11 NOTE — PATIENT INSTRUCTIONS
Go to the emergency department for severe symptoms.    Follow-up with ear, nose, throat specialist, referral done.  Please call the referral line to get ahead.    Starting today, please try to cut down on tobacco use.    Oral hygiene twice a day as before.  Also, rinse your mouth either with mouthwash or warm water every time you ingest anything with sugar or calories to prevent bacterial overgrowth.    Acetaminophen 500 mg (Extra StrengthTylenol), 2 tablets every 6 hours as needed for fever or pain.    No Ibuprofen (Advil or Motrin) or Naproxen (Aleve); no decongestants or cold/sinus medicines as these can raise your blood pressure.    Please restart your blood pressure medications today.

## 2025-03-11 NOTE — PROGRESS NOTES
Subjective   Patient ID: Yuni Jordan is a 54 y.o. female. They present today with a chief complaint of Sore Throat (X yesterday swollen on left side ).    History of Present Illness    Sore Throat         Patient complains of sudden onset of left-sided mouth/face/throat swelling since yesterday.  She does not recall what she was doing when it happened.  She has trouble swallowing her saliva.  Her throat is sore.  No history of tonsillectomy.  She denies airway constriction or shortness of breath.  She denies having any fever.  She does have some mucus production.  Denies tooth ache.    Past medical history significant for hypertension, noncompliant with her blood pressure medicines, history of C. difficile infection, diabetes diet controlled, she is currently not taking any meds for diabetes, chronic nicotine use with cigarettes.  She is ready to quit.  Counseling done.    Past Medical History  Allergies as of 03/11/2025 - Reviewed 03/11/2025   Allergen Reaction Noted    Aspirin Other 06/13/2023    Iodides Unknown 10/03/2023    Iodinated contrast media Other 06/13/2023    Iodine Unknown 10/03/2023    Nut - unspecified Itching 10/03/2023    Penicillins Other 06/13/2023    Shellfish containing products Unknown 10/03/2023       (Not in a hospital admission)       Past Medical History:   Diagnosis Date    Abnormal weight loss 12/13/2017    Rapid weight loss    Acute vaginitis 07/22/2019    Acute vaginitis    Morbid (severe) obesity due to excess calories (Multi) 01/05/2016    Obesities, morbid    Other acute postprocedural pain 10/11/2017    Acute post-operative pain    Other acute postprocedural pain 02/06/2015    Other acute postprocedural pain    Ovarian cyst 08/20/2014    Other and unspecified ovarian cysts    Pain in unspecified ankle and joints of unspecified foot 02/06/2015    Ankle joint pain    Personal history of other benign neoplasm 08/20/2014    History of uterine leiomyoma    Personal history of other  diseases of the digestive system 10/11/2017    History of hiatal hernia    Personal history of other diseases of the respiratory system 02/06/2015    History of asthma    Personal history of other endocrine, nutritional and metabolic disease 10/22/2014    History of hyperlipidemia    Personal history of other endocrine, nutritional and metabolic disease 09/11/2017    History of diabetes mellitus    Personal history of other endocrine, nutritional and metabolic disease 01/05/2016    History of diabetes mellitus    Personal history of other infectious and parasitic diseases 12/13/2018    History of Clostridioides difficile colitis    Personal history of other specified conditions 07/22/2019    History of diarrhea    Personal history of other specified conditions 12/13/2017    History of postoperative nausea and vomiting       Past Surgical History:   Procedure Laterality Date    COLONOSCOPY  04/2021    rpt 4-31    HERNIA REPAIR  04/19/2016    Hernia Repair    HYSTERECTOMY  02/12/2014    Hysterectomy    OTHER SURGICAL HISTORY  08/20/2014    Ovarian Cystectomy    OTHER SURGICAL HISTORY  08/20/2014    Laparoscopy Myomectomy    OTHER SURGICAL HISTORY  05/04/2016    Gastric Surgery For Morbid Obesity Laparoscopic Longitudinal Gastrectomy        reports that she has been smoking cigarettes. She has a 7.5 pack-year smoking history. She has never used smokeless tobacco. She reports that she does not currently use alcohol. She reports that she does not use drugs.    Review of Systems  Review of Systems   HENT:  Positive for sore throat.                                   Objective    Vitals:    03/11/25 0924 03/11/25 0926   BP: (!) 173/100 166/90   BP Location: Left arm Left arm   Patient Position: Sitting Sitting   Pulse: 84    Resp: 16    Temp: 37 °C (98.6 °F)    SpO2: 96%      No LMP recorded. Patient is postmenopausal.    Physical Exam      Constitutional: Vital signs reviewed. Well developed and well nourished. Patient  alert and without respiratory distress. + Hot potato voice.    Head and Face: Left lower half of the face is visibly swollen without overlying redness or skin changes, no lesions.  No signs of trauma.    Eyes: Pupils and irises normal. Pink conjunctivae, anicteric sclerae. No conjunctival injection.    Ears, Nose, Mouth and Throat: External inspection of ears: Normal. Otoscopic exam: Normal. Nasal Mucosa, septum and turbinates: Normal.  Oropharynx: +trismus but no drooling.  The tonsils and oropharynx are unremarkable.  No bulging pillars, no uvular deviation.  The area of concern is swelling and tenderness of the floor of the mouth on the left side possibly submandibular gland.  The swelling measures 2 x 2 cm, boggy.  No definite masses palpated otherwise.  The parotid gland and Stensen ducts are unremarkable.  Mild diffuse gingivitis but no tenderness, fluctuance or fistula.  No tenderness on palpating the teeth.    Neck: No neck mass observed. Supple.  Thyroid is normal on palpation.    Cardiovascular: Heart rate normal, normal S1 and S2, no gallops, no murmurs and no pericardial rub. Rhythm: Normal. No peripheral edema.    Pulmonary: No respiratory distress.  No stridor or retractions.  Thyroid is normal on palpation.  Clear breath sounds.    Neurologic: Cortical function: Normal    Lymphatic: No cervical lymphadenopathy      Procedures    Point of Care Test & Imaging Results from this visit  Results for orders placed or performed in visit on 03/11/25   POCT rapid strep A manually resulted   Result Value Ref Range    POC Rapid Strep Negative Negative      No results found.    Diagnostic study results (if any) were reviewed by Brooklyn Gonzalez MD.    Assessment/Plan   Allergies, medications, history, and pertinent labs/EKGs/Imaging reviewed by Brooklyn Gonzalez MD.     Medical Decision Making  Could be just a simple sialoadenitis although patient has risk factor: Chronic smoking -need to rule  out polyp or neoplasm.  For now we will treat with antibiotic.  Patient had history of C. difficile infection and allergic to penicillin so tried to avoid clindamycin.  I prescribed Bactrim DS.  Referred to ENT for further evaluation and definitive management.  Symptoms to watch and when to go to ED discussed.  History of hypertension, uncontrolled, encouraged to start taking her medications ASAP.  Avoid NSAIDs, take Tylenol only.  Patient states Tylenol usually does not work for her.  I offered tramadol however she has had bad reactions (feeling like not herself) on this medication so we avoided prescribing at this time.    Orders and Diagnoses  Diagnoses and all orders for this visit:  Acute sialoadenitis  -     sulfamethoxazole-trimethoprim (Bactrim DS) 800-160 mg tablet; Take 1 tablet by mouth 2 times a day for 7 days.  -     Referral to ENT; Future  Sore throat  -     POCT rapid strep A manually resulted  Hypertension, uncontrolled  Cigarette nicotine dependence without complication      Medical Admin Record      Patient disposition: Home    Electronically signed by Brooklyn Gonzalez MD  10:16 AM

## 2025-03-25 ENCOUNTER — APPOINTMENT (OUTPATIENT)
Facility: CLINIC | Age: 55
End: 2025-03-25
Payer: COMMERCIAL

## 2025-03-25 VITALS
TEMPERATURE: 97.4 F | WEIGHT: 211.3 LBS | DIASTOLIC BLOOD PRESSURE: 91 MMHG | SYSTOLIC BLOOD PRESSURE: 163 MMHG | BODY MASS INDEX: 38.88 KG/M2 | HEIGHT: 62 IN

## 2025-03-25 DIAGNOSIS — K06.8 PAIN OF GINGIVA: ICD-10-CM

## 2025-03-25 PROCEDURE — 3077F SYST BP >= 140 MM HG: CPT | Performed by: OTOLARYNGOLOGY

## 2025-03-25 PROCEDURE — 4010F ACE/ARB THERAPY RXD/TAKEN: CPT | Performed by: OTOLARYNGOLOGY

## 2025-03-25 PROCEDURE — 3080F DIAST BP >= 90 MM HG: CPT | Performed by: OTOLARYNGOLOGY

## 2025-03-25 PROCEDURE — 3008F BODY MASS INDEX DOCD: CPT | Performed by: OTOLARYNGOLOGY

## 2025-03-25 PROCEDURE — 4004F PT TOBACCO SCREEN RCVD TLK: CPT | Performed by: OTOLARYNGOLOGY

## 2025-03-25 PROCEDURE — 99204 OFFICE O/P NEW MOD 45 MIN: CPT | Performed by: OTOLARYNGOLOGY

## 2025-03-25 NOTE — PROGRESS NOTES
Impression:  1. Pain of gingiva  Referral to ENT           RECOMMENDATIONS/PLAN :  I reassured the patient there is no evidence of any swelling of her salivary glands and no obvious masses within her mouth.  It appears that she is complaining of dental pain behind her upper back molar and gums.  I want her to touch base with her dentist to make sure everything is normal.  She can otherwise follow-up as needed.      **This electronic medical record note was created with the use of voice recognition software.  Despite proofreading, typographical or grammatical errors may be present that could affect meaning of content **    Subjective   Patient ID:     Yuni Jordan is a 54 y.o. female who presents to the office today after she was seen through urgent care and told she may have a salivary gland infection.  She denies any swelling to the parotid or submandibular region however she has had pain behind her last upper molar along her left upper maxillary region.  She did have referred pain down her jaw.  No fever or chills.  She denies any gritty taste in her mouth or any pus draining into the mouth.  In the past she had had a previous dental abscess on the right side.  She has not seen her dentist recently.    ROS:  A detailed 12 system review of systems is noted on the intake form has been reviewed with the patient with details noted in the HPI and scanned into the patient's medical record.    Objective     Past Medical History:   Diagnosis Date    Abnormal weight loss 12/13/2017    Rapid weight loss    Acute vaginitis 07/22/2019    Acute vaginitis    Morbid (severe) obesity due to excess calories (Multi) 01/05/2016    Obesities, morbid    Other acute postprocedural pain 10/11/2017    Acute post-operative pain    Other acute postprocedural pain 02/06/2015    Other acute postprocedural pain    Ovarian cyst 08/20/2014    Other and unspecified ovarian cysts    Pain in unspecified ankle and joints of unspecified foot  02/06/2015    Ankle joint pain    Personal history of other benign neoplasm 08/20/2014    History of uterine leiomyoma    Personal history of other diseases of the digestive system 10/11/2017    History of hiatal hernia    Personal history of other diseases of the respiratory system 02/06/2015    History of asthma    Personal history of other endocrine, nutritional and metabolic disease 10/22/2014    History of hyperlipidemia    Personal history of other endocrine, nutritional and metabolic disease 09/11/2017    History of diabetes mellitus    Personal history of other endocrine, nutritional and metabolic disease 01/05/2016    History of diabetes mellitus    Personal history of other infectious and parasitic diseases 12/13/2018    History of Clostridioides difficile colitis    Personal history of other specified conditions 07/22/2019    History of diarrhea    Personal history of other specified conditions 12/13/2017    History of postoperative nausea and vomiting        Past Surgical History:   Procedure Laterality Date    COLONOSCOPY  04/2021    rpt 4-31    HERNIA REPAIR  04/19/2016    Hernia Repair    HYSTERECTOMY  02/12/2014    Hysterectomy    OTHER SURGICAL HISTORY  08/20/2014    Ovarian Cystectomy    OTHER SURGICAL HISTORY  08/20/2014    Laparoscopy Myomectomy    OTHER SURGICAL HISTORY  05/04/2016    Gastric Surgery For Morbid Obesity Laparoscopic Longitudinal Gastrectomy        Allergies   Allergen Reactions    Aspirin Other    Iodides Unknown    Iodinated Contrast Media Other    Iodine Unknown    Nut - Unspecified Itching    Penicillins Other    Shellfish Containing Products Unknown     HA/ passes out          Current Outpatient Medications:     albuterol (ProAir HFA) 90 mcg/actuation inhaler, Inhale 1-2 puffs. Every 4 to 6 hours as needed, Disp: , Rfl:     budesonide-formoteroL (Symbicort) 160-4.5 mcg/actuation inhaler, Inhale 2 puffs 2 times a day., Disp: , Rfl:     atorvastatin (Lipitor) 20 mg tablet, TAKE  1 TABLET BY MOUTH EVERY DAY (Patient not taking: Reported on 3/25/2025), Disp: 90 tablet, Rfl: 1    cyanocobalamin (Vitamin B-12) 500 mcg tablet, Take 1 tablet (500 mcg) by mouth once daily., Disp: , Rfl:     cyclobenzaprine (Flexeril) 10 mg tablet, Take 1 tablet (10 mg) by mouth as needed at bedtime for muscle spasms., Disp: 30 tablet, Rfl: 0    ergocalciferol (Vitamin D-2) 1.25 MG (83984 UT) capsule, Take 1 capsule (1,250 mcg) by mouth once a week., Disp: , Rfl:     esomeprazole (NexIUM) 40 mg DR capsule, Take 1 capsule (40 mg) by mouth once daily. (Patient not taking: Reported on 3/25/2025), Disp: , Rfl:     Jardiance 10 mg, TAKE 1 TABLET BY MOUTH EVERY DAY (Patient not taking: Reported on 3/25/2025), Disp: 30 tablet, Rfl: 1    lisinopril 5 mg tablet, Take 1 tablet (5 mg) by mouth once daily., Disp: , Rfl:     losartan (Cozaar) 50 mg tablet, Take 1 tablet (50 mg) by mouth once daily., Disp: 90 tablet, Rfl: 3    multivit-min/iron/folic acid/K (BARIATRIC MULTIVITAMINS ORAL), Take 1 tablet by mouth once daily., Disp: , Rfl:     omeprazole (PriLOSEC) 40 mg DR capsule, TAKE 1 CAPSULE BY MOUTH EVERY DAY, Disp: 90 capsule, Rfl: 2    ondansetron ODT (Zofran-ODT) 4 mg disintegrating tablet, Take 1 tablet (4 mg) by mouth every 8 hours if needed for nausea. (Patient not taking: Reported on 3/25/2025), Disp: , Rfl:     pantoprazole (ProtoNix) 40 mg EC tablet, TAKE 1 TABLET BY MOUTH EVERY DAY (Patient not taking: Reported on 3/25/2025), Disp: 30 tablet, Rfl: 3    sucralfate (Carafate) 100 mg/mL suspension, Take 10 mL (1 g) by mouth 4 times a day., Disp: , Rfl:     tirzepatide (Mounjaro) 2.5 mg/0.5 mL pen injector, Inject 2.5 mg under the skin 1 (one) time per week., Disp: , Rfl:     topiramate (Topamax) 25 mg tablet, Take by mouth. TAKE 1 TAB ONCE DAILY FOR 2 WEEKS TAKE 1 TAB TWICE A DAY FOR THEREAFTER, Disp: , Rfl:      Tobacco Use: High Risk (3/25/2025)    Patient History     Smoking Tobacco Use: Every Day     Smokeless  "Tobacco Use: Never     Passive Exposure: Not on file        Alcohol Use: Not At Risk (6/13/2023)    AUDIT-C     Frequency of Alcohol Consumption: Monthly or less     Average Number of Drinks: 1 or 2     Frequency of Binge Drinking: Never        Social History     Substance and Sexual Activity   Drug Use Never        Physical Exam:  Visit Vitals  BP (!) 163/91   Temp 36.3 °C (97.4 °F) (Temporal)   Ht 1.575 m (5' 2\")   Wt 95.8 kg (211 lb 4.8 oz)   BMI 38.65 kg/m²   OB Status Postmenopausal   Smoking Status Every Day   BSA 2.05 m²      General: Patient is alert, oriented, cooperative in no apparent distress.  Head: Normocephalic, atraumatic.  Eyes: PERRL, EOMI, Conjunctiva is clear. No nystagmus.  Ears: Right Ear-- Pinna is normal.  External auditory canal is patent. Tympanic membrane is [intact, translucent and has good mobility with my pneumatic otoscope. No effusion].  Mastoid is nontender.  Left ear-- Pinna is normal.  External auditory canal is patent. Tympanic membrane is [intact, translucent and has good mobility with my pneumatic otoscope.  No effusion].  Mastoid is nontender.  Nose: Septum is straight.  No septal perforation or lesions. No septal hematoma/ seroma.  No signs of bleeding.  Inferior turbinates are normal.   No evidence of intranasal polyps.  No infectious drainage.  Throat:  Floor of mouth is clear, no masses.  Tongue appears normal, no lesions or masses. Gums, gingiva, buccal mucosa appear pink and moist, no lesions.  No palpable masses.  Teeth are in good repair.  No obvious dental infections.  Peritonsillar regions appear symmetric without swelling.  Hard and soft palate appear normal, no obvious cleft. Uvula is midline.  Oropharynx: No lesions. Retropharyngeal wall is flat.  No active postnasal drip.  Neck: Supple,  no lymphadenopathy.  No masses.  Salivary Glands: Symmetric bilaterally.  No palpable masses.  No evidence of acute infection or salivary stones no evidence of inflammation of the " parotid or submandibular glands bilaterally.  Neurologic: Cranial Nerves 2-12 are grossly intact without focal deficits. Cerebellar function testing is normal.     Results:   []    Procedure:   []    Marcus Chau, DO

## 2025-03-25 NOTE — LETTER
March 25, 2025     Patient: Yuni Jordan   YOB: 1970   Date of Visit: 3/25/2025       To Whom It May Concern:    Yuni Jordan was seen in my clinic on 3/25/2025 at 10:30 am. Please excuse Yuni for her absence from work on this day to make the appointment.    If you have any questions or concerns, please don't hesitate to call.         Sincerely,         Marcus Chau, DO

## 2025-04-15 ENCOUNTER — APPOINTMENT (OUTPATIENT)
Dept: RADIOLOGY | Facility: HOSPITAL | Age: 55
DRG: 394 | End: 2025-04-15
Payer: COMMERCIAL

## 2025-04-15 ENCOUNTER — HOSPITAL ENCOUNTER (INPATIENT)
Facility: HOSPITAL | Age: 55
LOS: 1 days | Discharge: HOME | DRG: 394 | End: 2025-04-16
Attending: EMERGENCY MEDICINE | Admitting: INTERNAL MEDICINE
Payer: COMMERCIAL

## 2025-04-15 ENCOUNTER — OFFICE VISIT (OUTPATIENT)
Dept: PRIMARY CARE | Facility: CLINIC | Age: 55
End: 2025-04-15
Payer: COMMERCIAL

## 2025-04-15 DIAGNOSIS — R10.84 GENERALIZED ABDOMINAL PAIN: ICD-10-CM

## 2025-04-15 DIAGNOSIS — K63.3 INTESTINAL ULCERATION: Primary | ICD-10-CM

## 2025-04-15 DIAGNOSIS — E11.9 DIABETES MELLITUS TYPE 2, DIET-CONTROLLED: Primary | ICD-10-CM

## 2025-04-15 DIAGNOSIS — R10.13 EPIGASTRIC PAIN: ICD-10-CM

## 2025-04-15 DIAGNOSIS — K21.00 GASTROESOPHAGEAL REFLUX DISEASE WITH ESOPHAGITIS WITHOUT HEMORRHAGE: ICD-10-CM

## 2025-04-15 DIAGNOSIS — E78.2 MIXED HYPERLIPIDEMIA: ICD-10-CM

## 2025-04-15 DIAGNOSIS — I10 HYPERTENSION, UNCONTROLLED: ICD-10-CM

## 2025-04-15 DIAGNOSIS — Z98.84 HISTORY OF ROUX-EN-Y GASTRIC BYPASS: ICD-10-CM

## 2025-04-15 LAB
ALBUMIN SERPL BCP-MCNC: 4.3 G/DL (ref 3.4–5)
ALP SERPL-CCNC: 124 U/L (ref 33–110)
ALT SERPL W P-5'-P-CCNC: 19 U/L (ref 7–45)
ANION GAP SERPL CALC-SCNC: 13 MMOL/L (ref 10–20)
AST SERPL W P-5'-P-CCNC: 15 U/L (ref 9–39)
BASOPHILS # BLD AUTO: 0.09 X10*3/UL (ref 0–0.1)
BASOPHILS NFR BLD AUTO: 0.6 %
BILIRUB SERPL-MCNC: 0.6 MG/DL (ref 0–1.2)
BUN SERPL-MCNC: 11 MG/DL (ref 6–23)
CALCIUM SERPL-MCNC: 9.5 MG/DL (ref 8.6–10.3)
CHLORIDE SERPL-SCNC: 105 MMOL/L (ref 98–107)
CO2 SERPL-SCNC: 25 MMOL/L (ref 21–32)
CREAT SERPL-MCNC: 0.7 MG/DL (ref 0.5–1.05)
EGFRCR SERPLBLD CKD-EPI 2021: >90 ML/MIN/1.73M*2
EOSINOPHIL # BLD AUTO: 0.16 X10*3/UL (ref 0–0.7)
EOSINOPHIL NFR BLD AUTO: 1 %
ERYTHROCYTE [DISTWIDTH] IN BLOOD BY AUTOMATED COUNT: 13.2 % (ref 11.5–14.5)
GLUCOSE SERPL-MCNC: 95 MG/DL (ref 74–99)
HCT VFR BLD AUTO: 42.7 % (ref 36–46)
HGB BLD-MCNC: 13.6 G/DL (ref 12–16)
IMM GRANULOCYTES # BLD AUTO: 0.05 X10*3/UL (ref 0–0.7)
IMM GRANULOCYTES NFR BLD AUTO: 0.3 % (ref 0–0.9)
LACTATE SERPL-SCNC: 1.4 MMOL/L (ref 0.4–2)
LIPASE SERPL-CCNC: 43 U/L (ref 9–82)
LYMPHOCYTES # BLD AUTO: 3.46 X10*3/UL (ref 1.2–4.8)
LYMPHOCYTES NFR BLD AUTO: 22.1 %
MCH RBC QN AUTO: 29.9 PG (ref 26–34)
MCHC RBC AUTO-ENTMCNC: 31.9 G/DL (ref 32–36)
MCV RBC AUTO: 94 FL (ref 80–100)
MONOCYTES # BLD AUTO: 1.22 X10*3/UL (ref 0.1–1)
MONOCYTES NFR BLD AUTO: 7.8 %
NEUTROPHILS # BLD AUTO: 10.71 X10*3/UL (ref 1.2–7.7)
NEUTROPHILS NFR BLD AUTO: 68.2 %
NRBC BLD-RTO: 0 /100 WBCS (ref 0–0)
PLATELET # BLD AUTO: 263 X10*3/UL (ref 150–450)
POC FINGERSTICK BLOOD GLUCOSE: 74 MG/DL (ref 70–100)
POC HEMOGLOBIN A1C: 7 % (ref 4.2–6.5)
POTASSIUM SERPL-SCNC: 3.9 MMOL/L (ref 3.5–5.3)
PROT SERPL-MCNC: 7.8 G/DL (ref 6.4–8.2)
RBC # BLD AUTO: 4.55 X10*6/UL (ref 4–5.2)
SODIUM SERPL-SCNC: 139 MMOL/L (ref 136–145)
WBC # BLD AUTO: 15.7 X10*3/UL (ref 4.4–11.3)

## 2025-04-15 PROCEDURE — 96375 TX/PRO/DX INJ NEW DRUG ADDON: CPT

## 2025-04-15 PROCEDURE — 4010F ACE/ARB THERAPY RXD/TAKEN: CPT | Performed by: INTERNAL MEDICINE

## 2025-04-15 PROCEDURE — 2500000004 HC RX 250 GENERAL PHARMACY W/ HCPCS (ALT 636 FOR OP/ED): Performed by: EMERGENCY MEDICINE

## 2025-04-15 PROCEDURE — 83036 HEMOGLOBIN GLYCOSYLATED A1C: CPT | Performed by: INTERNAL MEDICINE

## 2025-04-15 PROCEDURE — 3051F HG A1C>EQUAL 7.0%<8.0%: CPT | Performed by: INTERNAL MEDICINE

## 2025-04-15 PROCEDURE — 83690 ASSAY OF LIPASE: CPT

## 2025-04-15 PROCEDURE — 85025 COMPLETE CBC W/AUTO DIFF WBC: CPT

## 2025-04-15 PROCEDURE — 96361 HYDRATE IV INFUSION ADD-ON: CPT

## 2025-04-15 PROCEDURE — 36415 COLL VENOUS BLD VENIPUNCTURE: CPT

## 2025-04-15 PROCEDURE — 99214 OFFICE O/P EST MOD 30 MIN: CPT | Performed by: INTERNAL MEDICINE

## 2025-04-15 PROCEDURE — 74176 CT ABD & PELVIS W/O CONTRAST: CPT

## 2025-04-15 PROCEDURE — 3080F DIAST BP >= 90 MM HG: CPT | Performed by: INTERNAL MEDICINE

## 2025-04-15 PROCEDURE — 82962 GLUCOSE BLOOD TEST: CPT | Performed by: INTERNAL MEDICINE

## 2025-04-15 PROCEDURE — 74176 CT ABD & PELVIS W/O CONTRAST: CPT | Performed by: RADIOLOGY

## 2025-04-15 PROCEDURE — 84075 ASSAY ALKALINE PHOSPHATASE: CPT

## 2025-04-15 PROCEDURE — 1210000001 HC SEMI-PRIVATE ROOM DAILY

## 2025-04-15 PROCEDURE — 96374 THER/PROPH/DIAG INJ IV PUSH: CPT

## 2025-04-15 PROCEDURE — 2500000004 HC RX 250 GENERAL PHARMACY W/ HCPCS (ALT 636 FOR OP/ED)

## 2025-04-15 PROCEDURE — 3077F SYST BP >= 140 MM HG: CPT | Performed by: INTERNAL MEDICINE

## 2025-04-15 PROCEDURE — 83605 ASSAY OF LACTIC ACID: CPT

## 2025-04-15 PROCEDURE — 99285 EMERGENCY DEPT VISIT HI MDM: CPT | Mod: 25 | Performed by: EMERGENCY MEDICINE

## 2025-04-15 PROCEDURE — 96376 TX/PRO/DX INJ SAME DRUG ADON: CPT

## 2025-04-15 PROCEDURE — 4004F PT TOBACCO SCREEN RCVD TLK: CPT | Performed by: INTERNAL MEDICINE

## 2025-04-15 RX ORDER — FORMOTEROL FUMARATE 20 UG/2ML
20 SOLUTION RESPIRATORY (INHALATION)
Status: DISCONTINUED | OUTPATIENT
Start: 2025-04-16 | End: 2025-04-16

## 2025-04-15 RX ORDER — MORPHINE SULFATE 4 MG/ML
4 INJECTION, SOLUTION INTRAMUSCULAR; INTRAVENOUS ONCE
Status: COMPLETED | OUTPATIENT
Start: 2025-04-15 | End: 2025-04-15

## 2025-04-15 RX ORDER — ALBUTEROL SULFATE 0.83 MG/ML
2.5 SOLUTION RESPIRATORY (INHALATION) EVERY 4 HOURS PRN
Status: DISCONTINUED | OUTPATIENT
Start: 2025-04-15 | End: 2025-04-16

## 2025-04-15 RX ORDER — FLUTICASONE FUROATE AND VILANTEROL 100; 25 UG/1; UG/1
1 POWDER RESPIRATORY (INHALATION)
Status: DISCONTINUED | OUTPATIENT
Start: 2025-04-16 | End: 2025-04-15

## 2025-04-15 RX ORDER — ALBUTEROL SULFATE 90 UG/1
2 INHALANT RESPIRATORY (INHALATION) EVERY 4 HOURS PRN
Status: DISCONTINUED | OUTPATIENT
Start: 2025-04-15 | End: 2025-04-15

## 2025-04-15 RX ORDER — FOLIC ACID 1 MG/1
1 TABLET ORAL DAILY
Status: DISCONTINUED | OUTPATIENT
Start: 2025-04-16 | End: 2025-04-16 | Stop reason: HOSPADM

## 2025-04-15 RX ORDER — ACETAMINOPHEN 650 MG/1
650 SUPPOSITORY RECTAL EVERY 4 HOURS PRN
Status: DISCONTINUED | OUTPATIENT
Start: 2025-04-15 | End: 2025-04-16 | Stop reason: HOSPADM

## 2025-04-15 RX ORDER — PANTOPRAZOLE SODIUM 40 MG/10ML
40 INJECTION, POWDER, LYOPHILIZED, FOR SOLUTION INTRAVENOUS ONCE
Status: COMPLETED | OUTPATIENT
Start: 2025-04-15 | End: 2025-04-15

## 2025-04-15 RX ORDER — ONDANSETRON 4 MG/1
4 TABLET, FILM COATED ORAL EVERY 8 HOURS PRN
Status: DISCONTINUED | OUTPATIENT
Start: 2025-04-15 | End: 2025-04-16 | Stop reason: HOSPADM

## 2025-04-15 RX ORDER — ENOXAPARIN SODIUM 100 MG/ML
40 INJECTION SUBCUTANEOUS
Status: DISCONTINUED | OUTPATIENT
Start: 2025-04-16 | End: 2025-04-16 | Stop reason: HOSPADM

## 2025-04-15 RX ORDER — FORMOTEROL FUMARATE 20 UG/2ML
20 SOLUTION RESPIRATORY (INHALATION)
Status: DISCONTINUED | OUTPATIENT
Start: 2025-04-15 | End: 2025-04-15

## 2025-04-15 RX ORDER — LANOLIN ALCOHOL/MO/W.PET/CERES
1000 CREAM (GRAM) TOPICAL DAILY
Status: DISCONTINUED | OUTPATIENT
Start: 2025-04-16 | End: 2025-04-16 | Stop reason: HOSPADM

## 2025-04-15 RX ORDER — MULTIVIT-MIN/IRON FUM/FOLIC AC 7.5 MG-4
1 TABLET ORAL DAILY
Status: DISCONTINUED | OUTPATIENT
Start: 2025-04-16 | End: 2025-04-16 | Stop reason: HOSPADM

## 2025-04-15 RX ORDER — LANOLIN ALCOHOL/MO/W.PET/CERES
100 CREAM (GRAM) TOPICAL DAILY
Status: DISCONTINUED | OUTPATIENT
Start: 2025-04-16 | End: 2025-04-16 | Stop reason: HOSPADM

## 2025-04-15 RX ORDER — PANTOPRAZOLE SODIUM 40 MG/10ML
40 INJECTION, POWDER, LYOPHILIZED, FOR SOLUTION INTRAVENOUS 2 TIMES DAILY
Status: DISCONTINUED | OUTPATIENT
Start: 2025-04-15 | End: 2025-04-16 | Stop reason: HOSPADM

## 2025-04-15 RX ORDER — LABETALOL HYDROCHLORIDE 5 MG/ML
10 INJECTION, SOLUTION INTRAVENOUS ONCE
Status: COMPLETED | OUTPATIENT
Start: 2025-04-15 | End: 2025-04-15

## 2025-04-15 RX ORDER — BUDESONIDE 0.5 MG/2ML
0.5 INHALANT ORAL
Status: DISCONTINUED | OUTPATIENT
Start: 2025-04-15 | End: 2025-04-15

## 2025-04-15 RX ORDER — ACETAMINOPHEN 160 MG/5ML
650 SOLUTION ORAL EVERY 4 HOURS PRN
Status: DISCONTINUED | OUTPATIENT
Start: 2025-04-15 | End: 2025-04-16 | Stop reason: HOSPADM

## 2025-04-15 RX ORDER — ACETAMINOPHEN 325 MG/1
650 TABLET ORAL EVERY 4 HOURS PRN
Status: DISCONTINUED | OUTPATIENT
Start: 2025-04-15 | End: 2025-04-16 | Stop reason: HOSPADM

## 2025-04-15 RX ORDER — SODIUM CHLORIDE, SODIUM LACTATE, POTASSIUM CHLORIDE, CALCIUM CHLORIDE 600; 310; 30; 20 MG/100ML; MG/100ML; MG/100ML; MG/100ML
100 INJECTION, SOLUTION INTRAVENOUS CONTINUOUS
Status: DISCONTINUED | OUTPATIENT
Start: 2025-04-16 | End: 2025-04-16 | Stop reason: HOSPADM

## 2025-04-15 RX ORDER — ONDANSETRON HYDROCHLORIDE 2 MG/ML
4 INJECTION, SOLUTION INTRAVENOUS ONCE
Status: COMPLETED | OUTPATIENT
Start: 2025-04-15 | End: 2025-04-15

## 2025-04-15 RX ORDER — BUDESONIDE 0.5 MG/2ML
0.5 INHALANT ORAL
Status: DISCONTINUED | OUTPATIENT
Start: 2025-04-16 | End: 2025-04-16

## 2025-04-15 RX ORDER — ONDANSETRON HYDROCHLORIDE 2 MG/ML
4 INJECTION, SOLUTION INTRAVENOUS EVERY 8 HOURS PRN
Status: DISCONTINUED | OUTPATIENT
Start: 2025-04-15 | End: 2025-04-16 | Stop reason: HOSPADM

## 2025-04-15 RX ADMIN — PANTOPRAZOLE SODIUM 40 MG: 40 INJECTION, POWDER, FOR SOLUTION INTRAVENOUS at 21:02

## 2025-04-15 RX ADMIN — MORPHINE SULFATE 4 MG: 4 INJECTION, SOLUTION INTRAMUSCULAR; INTRAVENOUS at 17:50

## 2025-04-15 RX ADMIN — LABETALOL HYDROCHLORIDE 10 MG: 5 INJECTION, SOLUTION INTRAVENOUS at 21:18

## 2025-04-15 RX ADMIN — ONDANSETRON 4 MG: 2 INJECTION, SOLUTION INTRAMUSCULAR; INTRAVENOUS at 21:02

## 2025-04-15 RX ADMIN — MORPHINE SULFATE 4 MG: 4 INJECTION, SOLUTION INTRAMUSCULAR; INTRAVENOUS at 21:02

## 2025-04-15 RX ADMIN — SODIUM CHLORIDE 1000 ML: 0.9 INJECTION, SOLUTION INTRAVENOUS at 21:18

## 2025-04-15 ASSESSMENT — ENCOUNTER SYMPTOMS
SPEECH DIFFICULTY: 0
TREMORS: 0
EYE REDNESS: 0
BELCHING: 1
WOUND: 0
CHEST TIGHTNESS: 0
SHORTNESS OF BREATH: 0
DIFFICULTY URINATING: 0
DIARRHEA: 1
PHOTOPHOBIA: 0
BLOOD IN STOOL: 0
COUGH: 0
ABDOMINAL DISTENTION: 0
DIZZINESS: 0
SINUS PAIN: 0
SEIZURES: 0
NAUSEA: 0
FLANK PAIN: 0
ARTHRALGIAS: 0
DIAPHORESIS: 0
CHOKING: 0
EYE ITCHING: 0
ANOREXIA: 1
RHINORRHEA: 0
STRIDOR: 0
COLOR CHANGE: 0
FACIAL ASYMMETRY: 0
TROUBLE SWALLOWING: 0
HEADACHES: 0
CHILLS: 0
FACIAL SWELLING: 0
MYALGIAS: 0
POLYPHAGIA: 0
LIGHT-HEADEDNESS: 0
POLYDIPSIA: 0
VOMITING: 0
FREQUENCY: 0
PALPITATIONS: 0
VOICE CHANGE: 0
APPETITE CHANGE: 0
ANAL BLEEDING: 0
WEAKNESS: 0
CONSTIPATION: 1
NECK STIFFNESS: 0
ACTIVITY CHANGE: 0
ADENOPATHY: 0
NECK PAIN: 0
SINUS PRESSURE: 0
WHEEZING: 0
FATIGUE: 0
DYSURIA: 0
SORE THROAT: 0
NUMBNESS: 0
HEMATURIA: 0
JOINT SWELLING: 0
SLEEP DISTURBANCE: 0
EYE DISCHARGE: 0
BACK PAIN: 1
BRUISES/BLEEDS EASILY: 0
RECTAL PAIN: 0
EYE PAIN: 0
ABDOMINAL PAIN: 1

## 2025-04-15 ASSESSMENT — COLUMBIA-SUICIDE SEVERITY RATING SCALE - C-SSRS
6. HAVE YOU EVER DONE ANYTHING, STARTED TO DO ANYTHING, OR PREPARED TO DO ANYTHING TO END YOUR LIFE?: NO
1. IN THE PAST MONTH, HAVE YOU WISHED YOU WERE DEAD OR WISHED YOU COULD GO TO SLEEP AND NOT WAKE UP?: NO
2. HAVE YOU ACTUALLY HAD ANY THOUGHTS OF KILLING YOURSELF?: NO

## 2025-04-15 ASSESSMENT — PAIN DESCRIPTION - ORIENTATION
ORIENTATION: RIGHT
ORIENTATION: MID

## 2025-04-15 ASSESSMENT — PAIN SCALES - GENERAL
PAINLEVEL_OUTOF10: 6
PAINLEVEL_OUTOF10: 10 - WORST POSSIBLE PAIN
PAINLEVEL_OUTOF10: 8

## 2025-04-15 ASSESSMENT — PAIN DESCRIPTION - LOCATION
LOCATION: ABDOMEN
LOCATION: ABDOMEN

## 2025-04-15 ASSESSMENT — PAIN DESCRIPTION - PAIN TYPE
TYPE: ACUTE PAIN
TYPE: ACUTE PAIN

## 2025-04-15 ASSESSMENT — PAIN - FUNCTIONAL ASSESSMENT: PAIN_FUNCTIONAL_ASSESSMENT: 0-10

## 2025-04-15 NOTE — ED TRIAGE NOTES
Patient presents to ED with URQ abdominal pain, nausea and vomiting, with a history of IBS and c-diff, states it feels like cramping.

## 2025-04-15 NOTE — LETTER
April 16, 2025     Patient: Yuni Jordan   YOB: 1970   Date of Visit: 4/15/2025       To Whom It May Concern:    Please excuse Yuni Jordan from work from 4/15/25 to 4/16/25 as she was hospitalized at [facility].    It is my medical opinion that Yuni Jordan may return to work on 4/22/25 .    If you have any questions or concerns, please don't hesitate to call.     Sincerely,  Electronically signed by Terry Gutierrez DO on 04/16/25 at 12:22 PM

## 2025-04-15 NOTE — PROGRESS NOTES
Subjective   Patient ID: Yuni Jordan is a 54 y.o. female who presents for GI Problem (Diarrhea and constipation).    Patient presents for follow-up.  I have not seen her in over 1 year.  She has not been taking any medications.  She has been complaining of worsening abdominal pain, nausea and diarrhea over the past week.  She reports that her abdominal pain is a 9 out of 10.  She denies any blood in her stool.  She denies any headaches, no dizziness, no chest pain or shortness of breath.  She reports that her abdominal pain radiates to her back.    Abdominal Pain  This is a recurrent problem. The current episode started in the past 7 days. The onset quality is sudden. The problem occurs 2 to 4 times per day. The most recent episode lasted 1 weeks. The problem has been waxing and waning. The pain is located in the generalized abdominal region. The pain is at a severity of 10/10. The quality of the pain is cramping and a sensation of fullness. The abdominal pain radiates to the back. Associated symptoms include anorexia, belching, constipation and diarrhea. Pertinent negatives include no arthralgias, dysuria, frequency, headaches, hematuria, myalgias, nausea or vomiting. The pain is aggravated by eating and movement. The pain is relieved by Passing flatus.        Review of Systems   Constitutional:  Negative for activity change, appetite change, chills, diaphoresis and fatigue.   HENT:  Negative for congestion, dental problem, drooling, ear discharge, ear pain, facial swelling, hearing loss, mouth sores, nosebleeds, postnasal drip, rhinorrhea, sinus pressure, sinus pain, sneezing, sore throat, tinnitus, trouble swallowing and voice change.    Eyes:  Negative for photophobia, pain, discharge, redness, itching and visual disturbance.   Respiratory:  Negative for cough, choking, chest tightness, shortness of breath, wheezing and stridor.    Cardiovascular:  Negative for chest pain, palpitations and leg swelling.    Gastrointestinal:  Positive for abdominal pain, anorexia, constipation and diarrhea. Negative for abdominal distention, anal bleeding, blood in stool, nausea, rectal pain and vomiting.   Endocrine: Negative for cold intolerance, heat intolerance, polydipsia, polyphagia and polyuria.   Genitourinary:  Negative for decreased urine volume, difficulty urinating, dysuria, enuresis, flank pain, frequency, genital sores, hematuria and urgency.   Musculoskeletal:  Positive for back pain. Negative for arthralgias, gait problem, joint swelling, myalgias, neck pain and neck stiffness.   Skin:  Negative for color change, pallor, rash and wound.   Neurological:  Negative for dizziness, tremors, seizures, syncope, facial asymmetry, speech difficulty, weakness, light-headedness, numbness and headaches.   Hematological:  Negative for adenopathy. Does not bruise/bleed easily.   Psychiatric/Behavioral:  Negative for sleep disturbance.        Objective   Wt 95.7 kg (211 lb)   BMI 38.59 kg/m²     Physical Exam  Constitutional:       Appearance: Normal appearance.   Cardiovascular:      Rate and Rhythm: Normal rate and regular rhythm.      Heart sounds: No murmur heard.     No gallop.   Pulmonary:      Effort: No respiratory distress.      Breath sounds: No wheezing or rales.   Abdominal:      General: There is no distension.      Palpations: There is no mass.      Tenderness: There is no abdominal tenderness (diffusely tender with no rebound). There is no guarding.   Musculoskeletal:      Right lower leg: No edema.      Left lower leg: No edema.   Neurological:      Mental Status: She is alert.       Assessment/Plan   Diagnoses and all orders for this visit:  Diabetes mellitus type 2, diet-controlled-hemoglobin A1c was 7.0.  Ophthalmology appointment has been done will need to restart medication  -     POCT glycosylated hemoglobin (Hb A1C) manually resulted  -     POCT Fingerstick Glucose manually resulted  Mixed hyperlipidemia-will  check lipids at next visit  Hypertension, uncontrolled--restart medication once better.  Gastroesophageal reflux disease with esophagitis without hemorrhage-stable symptoms  Generalized abdominal pain-she denies any symptoms.-Refer to emergency room for further evaluation.  Report was called.

## 2025-04-15 NOTE — ED TRIAGE NOTES
As provider-in-triage, I performed a medical screening history and physical exam on this patient.  HISTORY OF PRESENT ILLNESS  Patient is a 54-year-old female presenting to the ED with concern for abdominal pain.  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  She used to see GI however her GI doctor has retired.  Patient has had an extensive GI history including hiatal hernias and multiple abdominal surgeries.  Most recent episode of abdominal pain started Wednesday night.  Pain is a constant cramping located in the upper abdomen worse in the right upper quadrant with occasional bouts of sharp pain all over.  She has had liquid dark green diarrhea with no blood in the stool and abdominal cramping since then.  She has vomited twice in claims vomit appears mucousy.  Denies any fevers or chills.  She notes that she feels bloated and distended in her abdomen.     PHYSICAL EXAM  Vital Signs reviewed.  Cardiovascular: Regular rate and rhythm. No m/g/r  Respiratory: No respiratory distress. No accessory muscle use. Breath sounds are present and equal b/l. No adventitious sounds.   Abdomen: Abdomen is soft with no guarding, rigidity, or rebound. No CVA tenderness bilaterally.  Diffuse abdominal tenderness to light palpation.        MDM  Workup initiated. Pt stable pending bed availability and further evaluation. Please see subsequent provider note for further details and disposition.         I evaluated this patient in triage with the RN. Due to the patients complaint labs and or imaging were ordered by myself in an attempt to expedite patient care however I am not participating in care after evaluation. This is a preliminary assessment. Pt does not appear in acute distress at this time. They will have a full evaluation as soon as possible. They will be cared for by another provider who will possibly order more labs, imaging or interventions. Pt did not  have a full ROS or PE completed by myself however below is a summary with reasons for orders.  For the remainder of the patient's workup and ED course, please refer to the main ED provider note. We discussed need for diagnostic testing including laboratory studies and imaging.  We also discussed that patient may be asked to wait in the waiting room while these tests are pending.  They understand that if they choose to leave without having the testing completed or resulted that we cannot rule out acute life threatening illnesses and the risks involved could lead to worsening condition, permanent disability or even death.

## 2025-04-16 ENCOUNTER — ANESTHESIA EVENT (OUTPATIENT)
Dept: GASTROENTEROLOGY | Facility: HOSPITAL | Age: 55
DRG: 394 | End: 2025-04-16
Payer: COMMERCIAL

## 2025-04-16 ENCOUNTER — APPOINTMENT (OUTPATIENT)
Dept: GASTROENTEROLOGY | Facility: HOSPITAL | Age: 55
DRG: 394 | End: 2025-04-16
Payer: COMMERCIAL

## 2025-04-16 ENCOUNTER — PHARMACY VISIT (OUTPATIENT)
Dept: PHARMACY | Facility: CLINIC | Age: 55
End: 2025-04-16
Payer: COMMERCIAL

## 2025-04-16 ENCOUNTER — ANESTHESIA (OUTPATIENT)
Dept: GASTROENTEROLOGY | Facility: HOSPITAL | Age: 55
DRG: 394 | End: 2025-04-16
Payer: COMMERCIAL

## 2025-04-16 VITALS
WEIGHT: 211 LBS | BODY MASS INDEX: 38.59 KG/M2 | HEART RATE: 72 BPM | DIASTOLIC BLOOD PRESSURE: 90 MMHG | SYSTOLIC BLOOD PRESSURE: 150 MMHG

## 2025-04-16 VITALS
DIASTOLIC BLOOD PRESSURE: 91 MMHG | TEMPERATURE: 96.6 F | BODY MASS INDEX: 37.17 KG/M2 | SYSTOLIC BLOOD PRESSURE: 154 MMHG | HEART RATE: 85 BPM | RESPIRATION RATE: 16 BRPM | WEIGHT: 202 LBS | OXYGEN SATURATION: 96 % | HEIGHT: 62 IN

## 2025-04-16 LAB
ANION GAP SERPL CALC-SCNC: 13 MMOL/L (ref 10–20)
BUN SERPL-MCNC: 11 MG/DL (ref 6–23)
CALCIUM SERPL-MCNC: 8.7 MG/DL (ref 8.6–10.3)
CHLORIDE SERPL-SCNC: 107 MMOL/L (ref 98–107)
CO2 SERPL-SCNC: 22 MMOL/L (ref 21–32)
CREAT SERPL-MCNC: 0.64 MG/DL (ref 0.5–1.05)
EGFRCR SERPLBLD CKD-EPI 2021: >90 ML/MIN/1.73M*2
ERYTHROCYTE [DISTWIDTH] IN BLOOD BY AUTOMATED COUNT: 13.4 % (ref 11.5–14.5)
GLUCOSE BLD MANUAL STRIP-MCNC: 77 MG/DL (ref 74–99)
GLUCOSE BLD MANUAL STRIP-MCNC: 81 MG/DL (ref 74–99)
GLUCOSE SERPL-MCNC: 92 MG/DL (ref 74–99)
HCT VFR BLD AUTO: 37.8 % (ref 36–46)
HGB BLD-MCNC: 12.1 G/DL (ref 12–16)
MCH RBC QN AUTO: 30.5 PG (ref 26–34)
MCHC RBC AUTO-ENTMCNC: 32 G/DL (ref 32–36)
MCV RBC AUTO: 95 FL (ref 80–100)
NRBC BLD-RTO: 0 /100 WBCS (ref 0–0)
PLATELET # BLD AUTO: 224 X10*3/UL (ref 150–450)
POTASSIUM SERPL-SCNC: 3.6 MMOL/L (ref 3.5–5.3)
RBC # BLD AUTO: 3.97 X10*6/UL (ref 4–5.2)
SODIUM SERPL-SCNC: 138 MMOL/L (ref 136–145)
WBC # BLD AUTO: 13 X10*3/UL (ref 4.4–11.3)

## 2025-04-16 PROCEDURE — 0DB78ZX EXCISION OF STOMACH, PYLORUS, VIA NATURAL OR ARTIFICIAL OPENING ENDOSCOPIC, DIAGNOSTIC: ICD-10-PCS | Performed by: STUDENT IN AN ORGANIZED HEALTH CARE EDUCATION/TRAINING PROGRAM

## 2025-04-16 PROCEDURE — G0378 HOSPITAL OBSERVATION PER HR: HCPCS

## 2025-04-16 PROCEDURE — 7100000009 HC PHASE TWO TIME - INITIAL BASE CHARGE

## 2025-04-16 PROCEDURE — 0DB58ZX EXCISION OF ESOPHAGUS, VIA NATURAL OR ARTIFICIAL OPENING ENDOSCOPIC, DIAGNOSTIC: ICD-10-PCS | Performed by: STUDENT IN AN ORGANIZED HEALTH CARE EDUCATION/TRAINING PROGRAM

## 2025-04-16 PROCEDURE — 7100000010 HC PHASE TWO TIME - EACH INCREMENTAL 1 MINUTE

## 2025-04-16 PROCEDURE — 43239 EGD BIOPSY SINGLE/MULTIPLE: CPT | Performed by: STUDENT IN AN ORGANIZED HEALTH CARE EDUCATION/TRAINING PROGRAM

## 2025-04-16 PROCEDURE — 2500000004 HC RX 250 GENERAL PHARMACY W/ HCPCS (ALT 636 FOR OP/ED): Performed by: ANESTHESIOLOGIST ASSISTANT

## 2025-04-16 PROCEDURE — 36415 COLL VENOUS BLD VENIPUNCTURE: CPT | Performed by: INTERNAL MEDICINE

## 2025-04-16 PROCEDURE — A43239 PR EDG TRANSORAL BIOPSY SINGLE/MULTIPLE: Performed by: ANESTHESIOLOGY

## 2025-04-16 PROCEDURE — 99222 1ST HOSP IP/OBS MODERATE 55: CPT | Performed by: NURSE PRACTITIONER

## 2025-04-16 PROCEDURE — 2500000004 HC RX 250 GENERAL PHARMACY W/ HCPCS (ALT 636 FOR OP/ED): Mod: JZ | Performed by: INTERNAL MEDICINE

## 2025-04-16 PROCEDURE — RXMED WILLOW AMBULATORY MEDICATION CHARGE

## 2025-04-16 PROCEDURE — 82947 ASSAY GLUCOSE BLOOD QUANT: CPT

## 2025-04-16 PROCEDURE — 3700000002 HC GENERAL ANESTHESIA TIME - EACH INCREMENTAL 1 MINUTE

## 2025-04-16 PROCEDURE — 2500000001 HC RX 250 WO HCPCS SELF ADMINISTERED DRUGS (ALT 637 FOR MEDICARE OP): Performed by: INTERNAL MEDICINE

## 2025-04-16 PROCEDURE — 80048 BASIC METABOLIC PNL TOTAL CA: CPT | Performed by: INTERNAL MEDICINE

## 2025-04-16 PROCEDURE — 3700000001 HC GENERAL ANESTHESIA TIME - INITIAL BASE CHARGE

## 2025-04-16 PROCEDURE — 85027 COMPLETE CBC AUTOMATED: CPT | Performed by: INTERNAL MEDICINE

## 2025-04-16 PROCEDURE — A43239 PR EDG TRANSORAL BIOPSY SINGLE/MULTIPLE: Performed by: ANESTHESIOLOGIST ASSISTANT

## 2025-04-16 PROCEDURE — 99239 HOSP IP/OBS DSCHRG MGMT >30: CPT | Performed by: HOSPITALIST

## 2025-04-16 RX ORDER — MIDAZOLAM HYDROCHLORIDE 1 MG/ML
INJECTION INTRAMUSCULAR; INTRAVENOUS AS NEEDED
Status: DISCONTINUED | OUTPATIENT
Start: 2025-04-16 | End: 2025-04-16

## 2025-04-16 RX ORDER — ALBUTEROL SULFATE 0.83 MG/ML
2.5 SOLUTION RESPIRATORY (INHALATION) EVERY 2 HOUR PRN
Status: DISCONTINUED | OUTPATIENT
Start: 2025-04-16 | End: 2025-04-16 | Stop reason: HOSPADM

## 2025-04-16 RX ORDER — BUDESONIDE 0.5 MG/2ML
0.5 INHALANT ORAL 2 TIMES DAILY PRN
Status: DISCONTINUED | OUTPATIENT
Start: 2025-04-16 | End: 2025-04-16 | Stop reason: HOSPADM

## 2025-04-16 RX ORDER — ATORVASTATIN CALCIUM 20 MG/1
20 TABLET, FILM COATED ORAL NIGHTLY
Status: DISCONTINUED | OUTPATIENT
Start: 2025-04-16 | End: 2025-04-16 | Stop reason: HOSPADM

## 2025-04-16 RX ORDER — LOSARTAN POTASSIUM 50 MG/1
50 TABLET ORAL DAILY
Status: DISCONTINUED | OUTPATIENT
Start: 2025-04-16 | End: 2025-04-16 | Stop reason: HOSPADM

## 2025-04-16 RX ORDER — BISMUTH SUBSALICYLATE 262 MG
1 TABLET,CHEWABLE ORAL DAILY
Status: ON HOLD | COMMUNITY

## 2025-04-16 RX ORDER — PANTOPRAZOLE SODIUM 40 MG/1
40 TABLET, DELAYED RELEASE ORAL
Qty: 60 TABLET | Refills: 1 | Status: SHIPPED | OUTPATIENT
Start: 2025-04-16 | End: 2025-04-16

## 2025-04-16 RX ORDER — PROPOFOL 10 MG/ML
INJECTION, EMULSION INTRAVENOUS AS NEEDED
Status: DISCONTINUED | OUTPATIENT
Start: 2025-04-16 | End: 2025-04-16

## 2025-04-16 RX ORDER — LABETALOL HYDROCHLORIDE 5 MG/ML
INJECTION, SOLUTION INTRAVENOUS AS NEEDED
Status: DISCONTINUED | OUTPATIENT
Start: 2025-04-16 | End: 2025-04-16

## 2025-04-16 RX ORDER — MULTIVIT-MIN/IRON FUM/FOLIC AC 7.5 MG-4
1 TABLET ORAL DAILY
Qty: 30 TABLET | Refills: 0 | Status: ON HOLD | OUTPATIENT
Start: 2025-04-16

## 2025-04-16 RX ORDER — FORMOTEROL FUMARATE 20 UG/2ML
20 SOLUTION RESPIRATORY (INHALATION) 2 TIMES DAILY PRN
Status: DISCONTINUED | OUTPATIENT
Start: 2025-04-16 | End: 2025-04-16 | Stop reason: HOSPADM

## 2025-04-16 RX ORDER — PANTOPRAZOLE SODIUM 40 MG/1
TABLET, DELAYED RELEASE ORAL
Qty: 90 TABLET | Refills: 0 | Status: ON HOLD | OUTPATIENT
Start: 2025-04-16 | End: 2025-06-15

## 2025-04-16 RX ORDER — MORPHINE SULFATE 2 MG/ML
2 INJECTION, SOLUTION INTRAMUSCULAR; INTRAVENOUS EVERY 4 HOURS PRN
Status: DISCONTINUED | OUTPATIENT
Start: 2025-04-16 | End: 2025-04-16 | Stop reason: HOSPADM

## 2025-04-16 RX ADMIN — PANTOPRAZOLE SODIUM 40 MG: 40 INJECTION, POWDER, FOR SOLUTION INTRAVENOUS at 06:53

## 2025-04-16 RX ADMIN — Medication 1000 MCG: at 08:19

## 2025-04-16 RX ADMIN — LABETALOL HYDROCHLORIDE 10 MG: 5 INJECTION INTRAVENOUS at 11:03

## 2025-04-16 RX ADMIN — MIDAZOLAM HYDROCHLORIDE 2 MG: 1 INJECTION, SOLUTION INTRAMUSCULAR; INTRAVENOUS at 10:50

## 2025-04-16 RX ADMIN — MORPHINE SULFATE 2 MG: 2 INJECTION, SOLUTION INTRAMUSCULAR; INTRAVENOUS at 05:00

## 2025-04-16 RX ADMIN — LOSARTAN POTASSIUM 50 MG: 50 TABLET, FILM COATED ORAL at 08:19

## 2025-04-16 RX ADMIN — FOLIC ACID 1 MG: 1 TABLET ORAL at 08:19

## 2025-04-16 RX ADMIN — PROPOFOL 50 MG: 10 INJECTION, EMULSION INTRAVENOUS at 11:00

## 2025-04-16 RX ADMIN — Medication 1 TABLET: at 08:19

## 2025-04-16 RX ADMIN — ENOXAPARIN SODIUM 40 MG: 40 INJECTION SUBCUTANEOUS at 08:19

## 2025-04-16 RX ADMIN — PROPOFOL 100 MG: 10 INJECTION, EMULSION INTRAVENOUS at 10:56

## 2025-04-16 RX ADMIN — Medication 100 MG: at 08:19

## 2025-04-16 RX ADMIN — PROPOFOL 50 MG: 10 INJECTION, EMULSION INTRAVENOUS at 11:05

## 2025-04-16 RX ADMIN — ACETAMINOPHEN 650 MG: 325 TABLET, FILM COATED ORAL at 12:20

## 2025-04-16 SDOH — HEALTH STABILITY: MENTAL HEALTH: HOW OFTEN DO YOU HAVE A DRINK CONTAINING ALCOHOL?: NEVER

## 2025-04-16 SDOH — SOCIAL STABILITY: SOCIAL INSECURITY
WITHIN THE LAST YEAR, HAVE YOU BEEN KICKED, HIT, SLAPPED, OR OTHERWISE PHYSICALLY HURT BY YOUR PARTNER OR EX-PARTNER?: NO

## 2025-04-16 SDOH — HEALTH STABILITY: MENTAL HEALTH: HOW OFTEN DO YOU HAVE SIX OR MORE DRINKS ON ONE OCCASION?: NEVER

## 2025-04-16 SDOH — ECONOMIC STABILITY: FOOD INSECURITY: WITHIN THE PAST 12 MONTHS, THE FOOD YOU BOUGHT JUST DIDN'T LAST AND YOU DIDN'T HAVE MONEY TO GET MORE.: NEVER TRUE

## 2025-04-16 SDOH — HEALTH STABILITY: MENTAL HEALTH: HOW MANY DRINKS CONTAINING ALCOHOL DO YOU HAVE ON A TYPICAL DAY WHEN YOU ARE DRINKING?: 1 OR 2

## 2025-04-16 SDOH — ECONOMIC STABILITY: FOOD INSECURITY: HOW HARD IS IT FOR YOU TO PAY FOR THE VERY BASICS LIKE FOOD, HOUSING, MEDICAL CARE, AND HEATING?: NOT HARD AT ALL

## 2025-04-16 SDOH — HEALTH STABILITY: MENTAL HEALTH: HOW OFTEN DO YOU HAVE A DRINK CONTAINING ALCOHOL?: MONTHLY OR LESS

## 2025-04-16 SDOH — HEALTH STABILITY: MENTAL HEALTH: HOW MANY DRINKS CONTAINING ALCOHOL DO YOU HAVE ON A TYPICAL DAY WHEN YOU ARE DRINKING?: PATIENT DOES NOT DRINK

## 2025-04-16 SDOH — SOCIAL STABILITY: SOCIAL INSECURITY: WITHIN THE LAST YEAR, HAVE YOU BEEN HUMILIATED OR EMOTIONALLY ABUSED IN OTHER WAYS BY YOUR PARTNER OR EX-PARTNER?: NO

## 2025-04-16 SDOH — SOCIAL STABILITY: SOCIAL INSECURITY
WITHIN THE LAST YEAR, HAVE YOU BEEN RAPED OR FORCED TO HAVE ANY KIND OF SEXUAL ACTIVITY BY YOUR PARTNER OR EX-PARTNER?: NO

## 2025-04-16 SDOH — ECONOMIC STABILITY: FOOD INSECURITY: WITHIN THE PAST 12 MONTHS, YOU WORRIED THAT YOUR FOOD WOULD RUN OUT BEFORE YOU GOT THE MONEY TO BUY MORE.: NEVER TRUE

## 2025-04-16 SDOH — ECONOMIC STABILITY: HOUSING INSECURITY: IN THE LAST 12 MONTHS, WAS THERE A TIME WHEN YOU WERE NOT ABLE TO PAY THE MORTGAGE OR RENT ON TIME?: NO

## 2025-04-16 SDOH — ECONOMIC STABILITY: HOUSING INSECURITY: AT ANY TIME IN THE PAST 12 MONTHS, WERE YOU HOMELESS OR LIVING IN A SHELTER (INCLUDING NOW)?: NO

## 2025-04-16 SDOH — ECONOMIC STABILITY: INCOME INSECURITY: IN THE PAST 12 MONTHS HAS THE ELECTRIC, GAS, OIL, OR WATER COMPANY THREATENED TO SHUT OFF SERVICES IN YOUR HOME?: NO

## 2025-04-16 SDOH — ECONOMIC STABILITY: HOUSING INSECURITY: IN THE PAST 12 MONTHS, HOW MANY TIMES HAVE YOU MOVED WHERE YOU WERE LIVING?: 0

## 2025-04-16 SDOH — SOCIAL STABILITY: SOCIAL INSECURITY: WITHIN THE LAST YEAR, HAVE YOU BEEN AFRAID OF YOUR PARTNER OR EX-PARTNER?: NO

## 2025-04-16 SDOH — ECONOMIC STABILITY: TRANSPORTATION INSECURITY: IN THE PAST 12 MONTHS, HAS LACK OF TRANSPORTATION KEPT YOU FROM MEDICAL APPOINTMENTS OR FROM GETTING MEDICATIONS?: NO

## 2025-04-16 ASSESSMENT — ENCOUNTER SYMPTOMS
RESPIRATORY NEGATIVE: 1
ACTIVITY CHANGE: 1
DIARRHEA: 1
ABDOMINAL PAIN: 1
ALLERGIC/IMMUNOLOGIC NEGATIVE: 1
VOMITING: 1
CARDIOVASCULAR NEGATIVE: 1
PSYCHIATRIC NEGATIVE: 1
APPETITE CHANGE: 1
NAUSEA: 1
MUSCULOSKELETAL NEGATIVE: 1
EYES NEGATIVE: 1
FATIGUE: 1
HEMATOLOGIC/LYMPHATIC NEGATIVE: 1
ENDOCRINE NEGATIVE: 1
NEUROLOGICAL NEGATIVE: 1

## 2025-04-16 ASSESSMENT — LIFESTYLE VARIABLES
AUDIT-C TOTAL SCORE: 0
AUDIT-C TOTAL SCORE: 0
AUDIT-C TOTAL SCORE: 1
SKIP TO QUESTIONS 9-10: 1

## 2025-04-16 ASSESSMENT — COGNITIVE AND FUNCTIONAL STATUS - GENERAL
DAILY ACTIVITIY SCORE: 24
PATIENT BASELINE BEDBOUND: NO
MOBILITY SCORE: 24

## 2025-04-16 ASSESSMENT — ACTIVITIES OF DAILY LIVING (ADL)
HEARING - LEFT EAR: FUNCTIONAL
LACK_OF_TRANSPORTATION: NO
WALKS IN HOME: INDEPENDENT
TOILETING: INDEPENDENT
BATHING: INDEPENDENT
LACK_OF_TRANSPORTATION: NO
FEEDING YOURSELF: INDEPENDENT
GROOMING: INDEPENDENT
LACK_OF_TRANSPORTATION: NO
ADEQUATE_TO_COMPLETE_ADL: YES
PATIENT'S MEMORY ADEQUATE TO SAFELY COMPLETE DAILY ACTIVITIES?: YES
DRESSING YOURSELF: INDEPENDENT
JUDGMENT_ADEQUATE_SAFELY_COMPLETE_DAILY_ACTIVITIES: YES
HEARING - RIGHT EAR: FUNCTIONAL

## 2025-04-16 ASSESSMENT — COLUMBIA-SUICIDE SEVERITY RATING SCALE - C-SSRS
6. HAVE YOU EVER DONE ANYTHING, STARTED TO DO ANYTHING, OR PREPARED TO DO ANYTHING TO END YOUR LIFE?: NO
2. HAVE YOU ACTUALLY HAD ANY THOUGHTS OF KILLING YOURSELF?: NO
1. IN THE PAST MONTH, HAVE YOU WISHED YOU WERE DEAD OR WISHED YOU COULD GO TO SLEEP AND NOT WAKE UP?: NO

## 2025-04-16 ASSESSMENT — PAIN SCALES - GENERAL
PAINLEVEL_OUTOF10: 0 - NO PAIN
PAINLEVEL_OUTOF10: 3
PAINLEVEL_OUTOF10: 0 - NO PAIN
PAINLEVEL_OUTOF10: 0 - NO PAIN
PAINLEVEL_OUTOF10: 10 - WORST POSSIBLE PAIN

## 2025-04-16 ASSESSMENT — PAIN - FUNCTIONAL ASSESSMENT
PAIN_FUNCTIONAL_ASSESSMENT: 0-10

## 2025-04-16 NOTE — SIGNIFICANT EVENT
Patient is s/p EGD for evaluation of abnormal CT scan, dysphagia and abdominal pain in setting of hx of anastomotic ulcers post sawyer en Y     See procedure tab for full report     Single large, superficial, benign-appearing ulcer in the gastrojejunostomy with clean base (Fabio III)  Biopsied gastric remnant for H.pylori  Normal esophagus, biopsied for H.pylori      Recommendations   Await pathology results  Repeat EGD in 2 months, due: 6/15/2025  Pantoprazole BID for 1 month then daily indefinitely   Smoking cessation  Resume diet  Avoid NSAIDs    No barriers to discharge from GI perspective       Discussed with patient and care team

## 2025-04-16 NOTE — PROGRESS NOTES
[place] EMERGENCY DEPARTMENT ([facility])  11/06/18 ED 14  History     Chief Complaint   Patient presents with     Suicidal     The history is provided by the patient.     Alexandru Mitchell is a 20 year old male who presents with worsening depression and suicidal ideation.  He has a history of depression and 2 prior suicide attempts.  Patient states that his depression has been getting worse. He has some suicidal thoughts but no plan currently. He follows with [facility] mental health clinic and is seen there every 2 weeks. He is on psychiatric medications, is compliant with medications, is not using any drugs or alcohol. No history of hospitalizations, psychiatric or otherwise. Patient is from [place] originally, has primary care there.     I have reviewed the Medications, Allergies, Past Medical and Surgical History, and Social History in the Epic system.    Review of Systems   Psychiatric/Behavioral: Positive for dysphoric mood and suicidal ideas.       Physical Exam   BP: 143/88  Pulse: 103  Temp: 98.3  F (36.8  C)  Resp: 18  Weight: 108.8 kg (239 lb 12.8 oz)  SpO2: 98 %      Physical Exam   Constitutional: He is oriented to person, place, and time. He appears well-developed and well-nourished. No distress.   HENT:   Head: Normocephalic and atraumatic.   Eyes: No scleral icterus.   Neck: Normal range of motion. Neck supple.   Neurological: He is alert and oriented to person, place, and time.   Skin: Skin is warm and dry. No rash noted. He is not diaphoretic. No erythema. No pallor.   Psychiatric: He exhibits a depressed mood. He expresses no suicidal ideation. He expresses no suicidal plans.       ED Course     ED Course     Procedures             Critical Care time:  none             Labs Ordered and Resulted from Time of ED Arrival Up to the Time of Departure from the ED - No data to display         Assessments & Plan (with Medical Decision Making)   This is a 20-year-old male coming into     04/16/25 1041   Discharge Planning   Living Arrangements Alone   Support Systems Family members;Friends/neighbors   Assistance Needed none   Type of Residence Private residence   Number of Stairs to Enter Residence 2   Number of Stairs Within Residence 0   Do you have animals or pets at home? No   Who is requesting discharge planning? Provider   Home or Post Acute Services None   Expected Discharge Disposition Home   Does the patient need discharge transport arranged? No   Financial Resource Strain   How hard is it for you to pay for the very basics like food, housing, medical care, and heating? Not hard   Housing Stability   In the last 12 months, was there a time when you were not able to pay the mortgage or rent on time? N   In the past 12 months, how many times have you moved where you were living? 0   At any time in the past 12 months, were you homeless or living in a shelter (including now)? N   Transportation Needs   In the past 12 months, has lack of transportation kept you from medical appointments or from getting medications? no   In the past 12 months, has lack of transportation kept you from meetings, work, or from getting things needed for daily living? No   Stroke Family Assessment   Stroke Family Assessment Needed No   Intensity of Service   Intensity of Service 0-30 min     4/16/25 1042  Met with patient at bedside to discuss discharge planning.  PCP is Dr Jorje Kapadia and pharmacy is [facility] on [address] in [facility].  Patient lives at home alone in a house.   She is independent with ADLs and drives at baseline.  She does not use any assistive devices for ambulation.  She plans on returning home at discharge.  She denies any HHC, DME, or discharge needs.  She will notify the [facility] should any needs arise.  ADOD pending pain control and GI plan.  Belinda Garcia RN [facility]   emergency room stating that he was suicidal and severely depressed.  I did have the DEC discussed the case with him.  At this time our plan is for him to follow-up with an appointment tomorrow.  He will continue on the medication that he currently has.  He has no suicidal ideation or suicidal plans at this time.  I believe he can be safely discharged and can follow-up with his appointment tomorrow.    I have reviewed the nursing notes.    I have reviewed the findings, diagnosis, plan and need for follow up with the patient.    Discharge Medication List as of 11/6/2018  8:59 PM          Final diagnoses:   Depression, unspecified depression type     I, Jesenia Ozuna, am serving as a trained medical scribe to document services personally performed by Jonathan Montano MD based on the provider's statements to me on November 6, 2018.  This document has been checked and approved by the attending provider.    I, Jonathan Montano MD, was physically present and have reviewed and verified the accuracy of this note documented by Jesenia Ozuna, medical scribe.       11/6/2018   [facility], [place], EMERGENCY DEPARTMENT     Jonathan Montano MD  11/07/18 0104

## 2025-04-16 NOTE — ED PROVIDER NOTES
HPI   Chief Complaint   Patient presents with    Abdominal Pain       This is a 54-year-old woman with past medical history of Nadja-en-Y gastric bypass does present with complaint of worsening epigastric abdominal pain associated nausea and vomiting and some episodes of diarrhea x 2 to 3 days.  No dysuria hematuria flank pain.  No trauma.            Patient History   Past Medical History:   Diagnosis Date    Abnormal weight loss 12/13/2017    Rapid weight loss    Acute vaginitis 07/22/2019    Acute vaginitis    Morbid (severe) obesity due to excess calories (Multi) 01/05/2016    Obesities, morbid    Other acute postprocedural pain 10/11/2017    Acute post-operative pain    Other acute postprocedural pain 02/06/2015    Other acute postprocedural pain    Ovarian cyst 08/20/2014    Other and unspecified ovarian cysts    Pain in unspecified ankle and joints of unspecified foot 02/06/2015    Ankle joint pain    Personal history of other benign neoplasm 08/20/2014    History of uterine leiomyoma    Personal history of other diseases of the digestive system 10/11/2017    History of hiatal hernia    Personal history of other diseases of the respiratory system 02/06/2015    History of asthma    Personal history of other endocrine, nutritional and metabolic disease 10/22/2014    History of hyperlipidemia    Personal history of other endocrine, nutritional and metabolic disease 09/11/2017    History of diabetes mellitus    Personal history of other endocrine, nutritional and metabolic disease 01/05/2016    History of diabetes mellitus    Personal history of other infectious and parasitic diseases 12/13/2018    History of Clostridioides difficile colitis    Personal history of other specified conditions 07/22/2019    History of diarrhea    Personal history of other specified conditions 12/13/2017    History of postoperative nausea and vomiting     Past Surgical History:   Procedure Laterality Date    COLONOSCOPY  04/2021    rpt  4-31    HERNIA REPAIR  04/19/2016    Hernia Repair    HYSTERECTOMY  02/12/2014    Hysterectomy    OTHER SURGICAL HISTORY  08/20/2014    Ovarian Cystectomy    OTHER SURGICAL HISTORY  08/20/2014    Laparoscopy Myomectomy    OTHER SURGICAL HISTORY  05/04/2016    Gastric Surgery For Morbid Obesity Laparoscopic Longitudinal Gastrectomy     Family History   Problem Relation Name Age of Onset    Hypertension Mother      Cancer Mother      Hypertension Father      Heart failure Father      Breast cancer Mother's Sister  77     Social History     Tobacco Use    Smoking status: Every Day     Current packs/day: 0.25     Average packs/day: 0.3 packs/day for 30.0 years (7.5 ttl pk-yrs)     Types: Cigarettes    Smokeless tobacco: Never   Substance Use Topics    Alcohol use: Not Currently    Drug use: Never       Physical Exam   ED Triage Vitals [04/15/25 1434]   Temperature Heart Rate Respirations BP   37 °C (98.6 °F) 94 18 156/90      Pulse Ox Temp src Heart Rate Source Patient Position   98 % -- -- --      BP Location FiO2 (%)     -- --       Physical Exam  Vitals and nursing note reviewed.   Constitutional:       General: She is not in acute distress.     Appearance: She is well-developed. She is obese. She is not ill-appearing.   HENT:      Head: Normocephalic and atraumatic.      Mouth/Throat:      Mouth: Mucous membranes are moist.      Pharynx: Oropharynx is clear.   Eyes:      Extraocular Movements: Extraocular movements intact.      Pupils: Pupils are equal, round, and reactive to light.   Cardiovascular:      Rate and Rhythm: Normal rate and regular rhythm.      Heart sounds: Normal heart sounds.   Pulmonary:      Effort: Pulmonary effort is normal.      Breath sounds: Normal breath sounds.   Abdominal:      General: Abdomen is flat. Bowel sounds are normal.      Palpations: Abdomen is soft.      Tenderness: There is abdominal tenderness in the epigastric area. There is no right CVA tenderness, left CVA tenderness,  guarding or rebound. Negative signs include Bach's sign.   Skin:     General: Skin is warm and dry.      Capillary Refill: Capillary refill takes less than 2 seconds.      Coloration: Skin is not jaundiced or mottled.   Neurological:      General: No focal deficit present.      Mental Status: She is alert and oriented to person, place, and time.   Psychiatric:         Mood and Affect: Mood normal.         Behavior: Behavior normal.           ED Course & MDM   Diagnoses as of 04/15/25 0608   Intestinal ulceration                 No data recorded     Berkley Coma Scale Score: 15 (04/15/25 1434 : Robert Bates, DANNY)                           Medical Decision Making  IV is placed and labs drawn including CBC, CMP, lipase, lactic acid.  There was elevated blood cell count noted at 15.7.  No clinically significant anemia.  There was no acute kidney injury.  No metabolic anion gap acidosis.  Lipase negative for pancreatitis.  Lactic acid noted 1.4 negative for sepsis.  She received labetalol for her blood pressure.  She did receive 1 L normal saline bolus as well as morphine and Zofran.  A CT scan of the abdomen pelvis did show concern for likely inflammation of the jejunum with ulceration.  The gas-filled component does appear to be stable.  She does have old T9 compression fracture.  Discussed the case with Dr. Cash who did review imaging and felt patient to be stable Here for further workup by GI for her ulceration.  No signs for active GI bleed.  Admitted to Dr. Reyes for GEN med hospitalist iinpatient admit    Amount and/or Complexity of Data Reviewed  Labs: ordered. Decision-making details documented in ED Course.  Radiology: ordered. Decision-making details documented in ED Course.        Procedure  Procedures     John Packer MD  04/15/25 5255

## 2025-04-16 NOTE — H&P (VIEW-ONLY)
Reason For Consult  ANASTOMOTIC uLCER    History Of Present Illness  Yuni Jordan is a 54 y.o. female with h/o obesity, asthma, Nadja-en-Y gastric bypass, h/o anastomotic ulcers, presented with complaint of worsening epigastric abdominal pain associated nausea and vomiting and some episodes of diarrhea x 2 to 3 days.  The pain is located in the epigastric area and left upper quadrant.  Complains of odynophagia, nausea, vomiting without hematemesis.  Stated she has not been eating much since Saturday.  No melena or hematochezia.  Stated she had similar pain when she had ulcers.  She takes omeprazole as needed, usually 3 times a week, denies using NSAIDs, has not been using Carafate due to price. Labs on admission show normal renal function, elevated alk phos, other liver enzymes and bilirubin within normal limits, WBC 15.7, H&H 13.6 and 42.7, platelets 263.  CT showed inflammation involving the jejunal portion of the gastrojejunostomy with potential ulceration;  a focal gas-filled component along the upper inner margin  which appears to been present on prior examination potentially blind-ending portion of bowel.       EGD 10/6/2023 for epigastric pain, nausea and vomiting  Mildly severe reflux esophagitis with no bleeding  Gastritis  Normal examined jejunum.  STOMACH, ANTRUM, BIOPSY:  - OXYNTIC MUCOSA WITH NO SIGNIFICANT ABNORMALITY.  - NEGATIVE FOR HELICOBACTER.    EGD 5/26/21 for dysphagia, weight gain after bariatric surgery  - Small hiatal hernia.  - Gastric bypass with a pouch 5 cm in length and intact staple line. 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 Traversed with scope.  - No specimens collected.    EGD 4/14/2021 for dysphagia  Mildly severe reflux esophagitis with no bleeding  Gastritis  Gastric stenosis was found in the gastric body  No specimen collected.  Colonoscopy 4/14/2021 4 diminutive polyps removed from the rectum and sigmoid colon.  Biopsy showed  hyperplastic polyps  Internal hemorrhoids    Past Medical History  She has a past medical history of Abnormal weight loss (12/13/2017), Acute vaginitis (07/22/2019), Morbid (severe) obesity due to excess calories (Multi) (01/05/2016), Other acute postprocedural pain (10/11/2017), Other acute postprocedural pain (02/06/2015), Ovarian cyst (08/20/2014), Pain in unspecified ankle and joints of unspecified foot (02/06/2015), Personal history of other benign neoplasm (08/20/2014), Personal history of other diseases of the digestive system (10/11/2017), Personal history of other diseases of the respiratory system (02/06/2015), Personal history of other endocrine, nutritional and metabolic disease (10/22/2014), Personal history of other endocrine, nutritional and metabolic disease (09/11/2017), Personal history of other endocrine, nutritional and metabolic disease (01/05/2016), Personal history of other infectious and parasitic diseases (12/13/2018), Personal history of other specified conditions (07/22/2019), and Personal history of other specified conditions (12/13/2017).    Surgical History  She has a past surgical history that includes Hysterectomy (02/12/2014); Hernia repair (04/19/2016); Other surgical history (08/20/2014); Other surgical history (08/20/2014); Other surgical history (05/04/2016); and Colonoscopy (04/2021).     Social History  Smokes 2-3 [employer] [place] cigars, occasional alcohol use, most recently she was drinking on Friday.  Denies illicit drug use.      Family History  Family History[1]  Denies any known GI malignancies    Allergies  Aspirin, Iodides, Iodinated contrast media, Iodine, Nut - unspecified, Penicillins, and Shellfish containing products    Review of Systems  10 systems reviewed and negative other than HPI     Physical Exam  Physical Exam  Vitals reviewed.   Constitutional:       Appearance: Normal appearance.   HENT:      Head: Normocephalic and atraumatic.      Nose: Nose normal.       "Mouth/Throat:      Mouth: Mucous membranes are moist.      Pharynx: Oropharynx is clear.   Cardiovascular:      Rate and Rhythm: Normal rate and regular rhythm.      Heart sounds: Normal heart sounds.   Pulmonary:      Effort: Pulmonary effort is normal.      Breath sounds: Normal breath sounds.   Abdominal:      General: Bowel sounds are normal. There is no distension.      Palpations: Abdomen is soft.      Tenderness: There is generalized abdominal tenderness and tenderness in the left upper quadrant.   Skin:     General: Skin is warm and dry.   Neurological:      General: No focal deficit present.      Mental Status: She is alert and oriented to person, place, and time.   Psychiatric:         Mood and Affect: Mood normal.         Behavior: Behavior normal.            Last Recorded Vitals  Blood pressure 177/75, pulse 86, temperature 36 °C (96.8 °F), temperature source Temporal, resp. rate (!) 116, height 1.575 m (5' 2\"), weight 91.6 kg (202 lb), SpO2 95%.    Relevant Results      Scheduled medications  Scheduled Medications[2]  Continuous medications  Continuous Medications[3]  PRN medications  PRN Medications[4]    CT abdomen pelvis wo IV contrast  Result Date: 4/15/2025  1.  Suspect inflammation involving the jejunal portion of the gastrojejunostomy with potential ulceration. 2. There is a focal gas-filled component along the upper inner margin which appears to been present on prior examination potentially blind-ending portion of bowel. Focal ulceration could have a similar appearance. Attention recommended on follow-up. 3. Newly seen mild superior compressive deformity T9 vertebra, age indeterminate although not obviously acute. Attention recommended on clinical assessment. 4. Similar borderline caliber of the bile ducts.     MACRO: None   Signed by: Jakob Valera 4/15/2025 4:40 PM Dictation workstation:   [id number]    Results for orders placed or performed during the hospital encounter of 04/15/25 (from " the past 24 hours)   CBC and Auto Differential   Result Value Ref Range    WBC 15.7 (H) 4.4 - 11.3 x10*3/uL    nRBC 0.0 0.0 - 0.0 /100 WBCs    RBC 4.55 4.00 - 5.20 x10*6/uL    Hemoglobin 13.6 12.0 - 16.0 g/dL    Hematocrit 42.7 36.0 - 46.0 %    MCV 94 80 - 100 fL    MCH 29.9 26.0 - 34.0 pg    MCHC 31.9 (L) 32.0 - 36.0 g/dL    RDW 13.2 11.5 - 14.5 %    Platelets 263 150 - 450 x10*3/uL    Neutrophils % 68.2 40.0 - 80.0 %    Immature Granulocytes %, Automated 0.3 0.0 - 0.9 %    Lymphocytes % 22.1 13.0 - 44.0 %    Monocytes % 7.8 2.0 - 10.0 %    Eosinophils % 1.0 0.0 - 6.0 %    Basophils % 0.6 0.0 - 2.0 %    Neutrophils Absolute 10.71 (H) 1.20 - 7.70 x10*3/uL    Immature Granulocytes Absolute, Automated 0.05 0.00 - 0.70 x10*3/uL    Lymphocytes Absolute 3.46 1.20 - 4.80 x10*3/uL    Monocytes Absolute 1.22 (H) 0.10 - 1.00 x10*3/uL    Eosinophils Absolute 0.16 0.00 - 0.70 x10*3/uL    Basophils Absolute 0.09 0.00 - 0.10 x10*3/uL   Comprehensive metabolic panel   Result Value Ref Range    Glucose 95 74 - 99 mg/dL    Sodium 139 136 - 145 mmol/L    Potassium 3.9 3.5 - 5.3 mmol/L    Chloride 105 98 - 107 mmol/L    Bicarbonate 25 21 - 32 mmol/L    Anion Gap 13 10 - 20 mmol/L    Urea Nitrogen 11 6 - 23 mg/dL    Creatinine 0.70 0.50 - 1.05 mg/dL    eGFR >90 >60 mL/min/1.73m*2    Calcium 9.5 8.6 - 10.3 mg/dL    Albumin 4.3 3.4 - 5.0 g/dL    Alkaline Phosphatase 124 (H) 33 - 110 U/L    Total Protein 7.8 6.4 - 8.2 g/dL    AST 15 9 - 39 U/L    Bilirubin, Total 0.6 0.0 - 1.2 mg/dL    ALT 19 7 - 45 U/L   Lipase   Result Value Ref Range    Lipase 43 9 - 82 U/L   Lactate   Result Value Ref Range    Lactate 1.4 0.4 - 2.0 mmol/L   CBC   Result Value Ref Range    WBC 13.0 (H) 4.4 - 11.3 x10*3/uL    nRBC 0.0 0.0 - 0.0 /100 WBCs    RBC 3.97 (L) 4.00 - 5.20 x10*6/uL    Hemoglobin 12.1 12.0 - 16.0 g/dL    Hematocrit 37.8 36.0 - 46.0 %    MCV 95 80 - 100 fL    MCH 30.5 26.0 - 34.0 pg    MCHC 32.0 32.0 - 36.0 g/dL    RDW 13.4 11.5 - 14.5 %     Platelets 224 150 - 450 x10*3/uL   Basic metabolic panel   Result Value Ref Range    Glucose 92 74 - 99 mg/dL    Sodium 138 136 - 145 mmol/L    Potassium 3.6 3.5 - 5.3 mmol/L    Chloride 107 98 - 107 mmol/L    Bicarbonate 22 21 - 32 mmol/L    Anion Gap 13 10 - 20 mmol/L    Urea Nitrogen 11 6 - 23 mg/dL    Creatinine 0.64 0.50 - 1.05 mg/dL    eGFR >90 >60 mL/min/1.73m*2    Calcium 8.7 8.6 - 10.3 mg/dL          Assessment/Plan     54 y.o. female with h/o obesity, asthma, Nadja-en-Y gastric bypass, h/o anastomotic ulcers, presented with c/o worsening RUQ and epigastric abdominal pain associated nausea and vomiting without hematemesis, for 3 days; imaging suggestive of inflammation involving the jejunal portion of the gastrojejunostomy with potential ulceration.    -Recommend to continue twice daily PPI  - Discussed with Dr. Cash, plan for EGD today  - Patient strongly advised to quit smoking and alcohol use.  Further recommendations pending EGD results.    Helene Zamora, APRN-CNP         [1]   Family History  Problem Relation Name Age of Onset    Hypertension Mother      Cancer Mother      Hypertension Father      Heart failure Father      Breast cancer Mother's Sister  77   [2] atorvastatin, 20 mg, oral, Nightly  cyanocobalamin, 1,000 mcg, oral, Daily  enoxaparin, 40 mg, subcutaneous, q24h JAMEL  folic acid, 1 mg, oral, Daily  losartan, 50 mg, oral, Daily  multivitamin with minerals, 1 tablet, oral, Daily  pantoprazole, 40 mg, intravenous, BID  thiamine, 100 mg, oral, Daily  [3] lactated Ringer's, 100 mL/hr  [4] PRN medications: acetaminophen **OR** acetaminophen **OR** acetaminophen, albuterol, budesonide, formoterol, morphine, ondansetron **OR** ondansetron

## 2025-04-16 NOTE — ANESTHESIA PREPROCEDURE EVALUATION
Patient: Yuni Jordan    Procedure Information       Date/Time: 04/16/25 0920    Scheduled providers: Shonda Cash MD; Pieter Bull MD; LILIYA Hillman    Procedure: EGD    Location: [facility]            Relevant Problems   Cardiac   (+) Hyperlipemia   (+) Hypertension, uncontrolled      Neuro   (+) Anxiety   (+) Carpal tunnel syndrome   (+) Depression with anxiety      GI   (+) Acid reflux   (+) Diffuse varioliform gastritis      Endocrine   (+) Diabetes mellitus type 2, diet-controlled      Hematology   (+) Anemia, iron deficiency      Musculoskeletal   (+) Carpal tunnel syndrome       Clinical information reviewed:   Tobacco  Allergies  Meds  Problems  Med Hx  Surg Hx   Fam Hx  Soc   Hx         Medical History[1]   Surgical History[2]  Social History[3]   Current Outpatient Medications   Medication Instructions    albuterol (ProAir HFA) 90 mcg/actuation inhaler 1-2 puffs    atorvastatin (Lipitor) 20 mg tablet TAKE 1 TABLET BY MOUTH EVERY DAY    budesonide-formoteroL (Symbicort) 160-4.5 mcg/actuation inhaler 2 puffs, 2 times daily    cyclobenzaprine (FLEXERIL) 10 mg, oral, Nightly PRN    losartan (COZAAR) 50 mg, oral, Daily    multivit-min/iron/folic acid/K (BARIATRIC MULTIVITAMINS ORAL) 1 tablet, oral, Daily    omeprazole (PriLOSEC) 40 mg DR capsule TAKE 1 CAPSULE BY MOUTH EVERY DAY      RX Allergies[4]     Chemistry    Lab Results   Component Value Date/Time     04/16/2025 0440    K 3.6 04/16/2025 0440     04/16/2025 0440    CO2 22 04/16/2025 0440    BUN 11 04/16/2025 0440    CREATININE 0.64 04/16/2025 0440    Lab Results   Component Value Date/Time    CALCIUM 8.7 04/16/2025 0440    ALKPHOS 124 (H) 04/15/2025 1533    AST 15 04/15/2025 1533    ALT 19 04/15/2025 1533    BILITOT 0.6 04/15/2025 1533          Lab Results   Component Value Date    HGBA1C 7.0 (A) 04/15/2025     Lab Results   Component Value Date/Time    WBC 13.0 (H) 04/16/2025 0440    HGB 12.1 04/16/2025  "0440    HCT 37.8 04/16/2025 0440     04/16/2025 0440     Lab Results   Component Value Date/Time    PROTIME 13.4 (H) 11/30/2018 2041    INR 1.2 (H) 11/30/2018 2041     No results found for: \"ABORH\"  No results found for this or any previous visit (from the past 4464 hours).  No results found for this or any previous visit from the past 1095 days.       Visit Vitals  BP (!) 184/84   Pulse 92   Temp 36.5 °C (97.7 °F) (Temporal)   Resp 16   Ht 1.575 m (5' 2\")   Wt 91.6 kg (202 lb)   SpO2 97%   BMI 36.95 kg/m²   OB Status Postmenopausal   Smoking Status Some Days   BSA 2 m²     NPO/Void Status  Carbohydrate Drink Given Prior to Surgery? : N  Date of Last Liquid: 04/16/25  Time of Last Liquid: 0800  Date of Last Solid: 04/12/25  Time of Last Solid: 2000  Last Intake Type: Clear fluids  Time of Last Void: 0930        Physical Exam    Airway  Mallampati: III  TM distance: >3 FB  Neck ROM: full  Mouth opening: 3 or more finger widths     Cardiovascular - normal exam  Rhythm: regular  Rate: normal     Dental    Pulmonary - normal exam   Abdominal - normal exam           Anesthesia Plan    History of general anesthesia?: yes  History of complications of general anesthesia?: no    ASA 3     MAC   (Standard ASA monitoring.)  intravenous induction   Anesthetic plan and risks discussed with patient.    Plan discussed with CRNA and CAA.             [1]   Past Medical History:  Diagnosis Date    Asthma     Diabetes mellitus (Multi)     patient states she used to be but she guesses so    Eczema 1971    GERD (gastroesophageal reflux disease) 2012    ARTUR (obstructive sleep apnea)     patient denies   [2]   Past Surgical History:  Procedure Laterality Date    BARIATRIC SURGERY      COLONOSCOPY  04/2021    rpt 4-31    ESOPHAGOGASTRODUODENOSCOPY      HERNIA REPAIR      HYSTERECTOMY  02/12/2004    MYOMECTOMY      Laparoscopic    OVARIAN CYST REMOVAL     [3]   Social History  Tobacco Use    Smoking status: Some Days     Types: " Cigars    Smokeless tobacco: Never   Substance Use Topics    Alcohol use: Not Currently     Comment: socially    Drug use: Never   [4]   Allergies  Allergen Reactions    Aspirin Other    Iodides Unknown    Iodinated Contrast Media Other    Iodine Unknown    Nut - Unspecified Itching    Penicillins Other    Shellfish Containing Products Unknown     HA/ passes out

## 2025-04-16 NOTE — PROGRESS NOTES
Pharmacy Medication History     Source of Information: Per patient     Additional concerns with the patient's PTA list.   Patient says supposed to be taking lipitor but really hasn't been taking. The omeprazole was taking PRN not daily    Notified Provider via Haiku : Yes    The following updates were made to the Prior to Admission medication list:     Medications ADDED:   Multivitamin   Medications CHANGED:  Prilosec is prn   Medications REMOVED:   Symbicort   Bariatric multivitamin   Medications NOT TAKING:   Lipitor but supposed to be     Allergy reviewed : Yes    Meds 2 Beds : N/A    Outpatient pharmacy confirmed and updated in chart : Yes    Pharmacy name: [facility]     The list below reflectives the updated PTA list. Please review each medication in order reconciliation for additional clarification and justification.    Prior to Admission Medications   Prescriptions Last Dose   albuterol (ProAir HFA) 90 mcg/actuation inhaler Past Week   Sig: Inhale 1-2 puffs. Every 4 to 6 hours as needed   atorvastatin (Lipitor) 20 mg tablet 4/15/2025   Sig: TAKE 1 TABLET BY MOUTH EVERY DAY   Patient taking differently: Take 1 tablet (20 mg) by mouth once daily.                              multivitamin tablet    Sig: Take 1 tablet by mouth once daily.   omeprazole (PriLOSEC) 40 mg DR capsule    Sig: TAKE 1 CAPSULE BY MOUTH EVERY DAY   Patient taking differently: Take 1 capsule (40 mg) by mouth once daily as needed.      Facility-Administered Medications: None       The list below reflectives the updated allergy list. Please review each documented allergy for additional clarification and justification.    Allergies   Allergen Reactions    Aspirin Other    Iodides Unknown    Iodinated Contrast Media Other    Iodine Unknown    Nut - Unspecified Itching    Penicillins Other    Shellfish Containing Products Unknown     HA/ passes out          04/16/25 at 12:41 PM - Nayla Gauthier

## 2025-04-16 NOTE — CARE PLAN
The patient's goals for the shift include      The clinical goals for the shift include remain HDS throughout shift  Problem: Pain - Adult  Goal: Verbalizes/displays adequate comfort level or baseline comfort level  4/16/2025 1226 by Joseline Marinelli RN  Outcome: Adequate for Discharge  4/16/2025 1142 by Joseline Marinelli RN  Outcome: Progressing     Problem: Safety - Adult  Goal: Free from fall injury  4/16/2025 1226 by Joseline Marinelli RN  Outcome: Adequate for Discharge  4/16/2025 1142 by Joseline Marinelli RN  Outcome: Progressing     Problem: Discharge Planning  Goal: Discharge to home or other facility with appropriate resources  4/16/2025 1226 by Joseline Marinelli RN  Outcome: Adequate for Discharge  4/16/2025 1142 by Joseline Marinelli RN  Outcome: Progressing     Problem: Chronic Conditions and Co-morbidities  Goal: Patient's chronic conditions and co-morbidity symptoms are monitored and maintained or improved  4/16/2025 1226 by Joseline Marinelli RN  Outcome: Adequate for Discharge  4/16/2025 1142 by Joseline Marinelli RN  Outcome: Progressing     Problem: Nutrition  Goal: Nutrient intake appropriate for maintaining nutritional needs  4/16/2025 1226 by Joseline Marinelli RN  Outcome: Adequate for Discharge  4/16/2025 1142 by Joseline Marinelli RN  Outcome: Progressing

## 2025-04-16 NOTE — DISCHARGE SUMMARY
Discharge Diagnosis  Gastrojejunostomy ucler  S/p Nadja-en-y gastric bypass surgery  HTN  HLD  Obesity     Issues Requiring Follow-Up  - OP follow up with PCP, GI  - repeat EGD in 2 months    Discharge Meds     Medication List      START taking these medications     pantoprazole 40 mg EC tablet; Commonly known as: ProtoNix; Take 1 tablet   (40 mg) by mouth 2 times a day before meals for 30 days, THEN 1 tablet (40   mg) once daily in the morning. Take before meals. Do not crush, chew, or   split.; Start taking on: April 16, 2025   Tab-A-Narendra Multivitamin w-iron  mg-mcg tablet; Generic drug:   multivitamin with minerals; Take 1 tablet by mouth once daily.; Replaces:   BARIATRIC MULTIVITAMINS ORAL     CONTINUE taking these medications     atorvastatin 20 mg tablet; Commonly known as: Lipitor; TAKE 1 TABLET BY   MOUTH EVERY DAY   cyclobenzaprine 10 mg tablet; Commonly known as: Flexeril; Take 1 tablet   (10 mg) by mouth as needed at bedtime for muscle spasms.   losartan 50 mg tablet; Commonly known as: Cozaar; Take 1 tablet (50 mg)   by mouth once daily.   ProAir HFA 90 mcg/actuation inhaler; Generic drug: albuterol     STOP taking these medications     BARIATRIC MULTIVITAMINS ORAL; Replaced by: Tab-A-Narendra Multivitamin   w-iron  mg-mcg tablet   omeprazole 40 mg DR capsule; Commonly known as: PriLOSEC     ASK your doctor about these medications     multivitamin tablet       Test Results Pending At Discharge  Pending Labs       Order Current Status    Surgical Pathology Exam In process            Hospital Course  54 y.o. female presenting with a past medical history of morbid obesity s/p Nadja-en-Y gastric bypass who has been diagnosed with a anastomotic ulcer at the gastrojejunal junction and presents to [facility] complaining of worsening epigastric abdominal pain with associated nausea and vomiting and some episodes of diarrhea for the past 3 days. CT showed inflammation involving the jejunal  portion of the gastrojejunostomy with potential ulceration;  a focal gas-filled component along the upper inner margin which appears to been present on prior examination potentially blind-ending portion of bowel. GI consulted and taken for EGD.     EGD showed:  Single large, superficial, benign-appearing ulcer in the gastrojejunostomy with clean base (Fabio III)  Biopsied gastric remnant for H.pylori  Normal esophagus, biopsied for H.pylori     Recommendations   Await pathology results  Repeat EGD in 2 months, due: 6/15/2025  Pantoprazole BID for 1 month then daily indefinitely   Smoking cessation  Resume diet  Avoid NSAIDs    Pertinent Physical Exam At Time of Discharge  Physical Exam  Vitals and nursing note reviewed.   HENT:      Mouth/Throat:      Mouth: Mucous membranes are moist.      Pharynx: Oropharynx is clear.   Cardiovascular:      Rate and Rhythm: Normal rate and regular rhythm.   Pulmonary:      Effort: Pulmonary effort is normal.   Abdominal:      General: There is no distension.      Palpations: Abdomen is soft.      Tenderness: There is no abdominal tenderness.   Neurological:      Mental Status: She is alert and oriented to person, place, and time.         Outpatient Follow-Up  Future Appointments   Date Time Provider Department Center   5/23/2025  2:00 PM Jorje Kapadia MD [id number] [facility]     DISCHARGE TIME: > 30 minutes    Terry Gutierrez DO

## 2025-04-16 NOTE — DISCHARGE INSTRUCTIONS
You had an EGD for evaluation of abnormal CT scan, dysphagia and abdominal pain in setting of hx of anastomotic ulcers post sawyer en Y      EGD showed:   Single large, superficial, benign-appearing ulcer in the gastrojejunostomy with clean base (Fabio III)  Biopsied gastric remnant for H.pylori  Normal esophagus, biopsied for H.pylori      Recommendations   Await pathology results  Repeat EGD in 2 months, due: 6/15/2025  Pantoprazole twice a day for 1 month then daily indefinitely   Smoking cessation  Resume diet  Avoid NSAIDs

## 2025-04-16 NOTE — ANESTHESIA POSTPROCEDURE EVALUATION
Patient: Yuni Jordan    Procedure Summary       Date: 04/16/25 Room / Location: [facility]    Anesthesia Start: 1049 Anesthesia Stop: 1117    Procedure: EGD Diagnosis:       Epigastric pain      History of Nadja-en-Y gastric bypass      Esophageal dysphagia    Scheduled Providers: Shonda Cash MD; Pieter Bull MD; LILIYA Hillman Responsible Provider: Pieter Bull MD    Anesthesia Type: MAC ASA Status: 3            Anesthesia Type: MAC    Vitals Value Taken Time   /64 04/16/25 11:42   Temp 36.5 °C (97.7 °F) 04/16/25 11:12   Pulse 80 04/16/25 11:42   Resp 14 04/16/25 11:42   SpO2 98 % 04/16/25 11:42       Anesthesia Post Evaluation    Patient location during evaluation: PACU  Patient participation: complete - patient participated  Level of consciousness: awake and alert  Pain management: adequate  Airway patency: patent  Cardiovascular status: acceptable and hemodynamically stable  Respiratory status: acceptable, spontaneous ventilation and nonlabored ventilation  Hydration status: acceptable  Postoperative Nausea and Vomiting: none        There were no known notable events for this encounter.

## 2025-04-16 NOTE — CARE PLAN
The patient's goals for the shift include      The clinical goals for the shift include maintain hemodynamic stability  Problem: Pain - Adult  Goal: Verbalizes/displays adequate comfort level or baseline comfort level  Outcome: Progressing     Problem: Safety - Adult  Goal: Free from fall injury  Outcome: Progressing     Problem: Discharge Planning  Goal: Discharge to home or other facility with appropriate resources  Outcome: Progressing     Problem: Chronic Conditions and Co-morbidities  Goal: Patient's chronic conditions and co-morbidity symptoms are monitored and maintained or improved  Outcome: Progressing     Problem: Nutrition  Goal: Nutrient intake appropriate for maintaining nutritional needs  Outcome: Progressing

## 2025-04-16 NOTE — H&P
History Of Present Illness  Yuni Jordan is a 54 y.o. female presenting with a past medical history of morbid obesity s/p Nadja-en-Y gastric bypass who has been diagnosed with a anastomotic ulcer at the gastrojejunal junction and presents to [facility] ER complaining of worsening epigastric abdominal pain with associated nausea and vomiting and some episodes of diarrhea for the past 3 days.  She denies any dysuria hematuria flank pain fever or chills.  No chest pain or shortness of breath     Past Medical History  Past Medical History:   Diagnosis Date    Abnormal weight loss 12/13/2017    Rapid weight loss    Acute vaginitis 07/22/2019    Acute vaginitis    Morbid (severe) obesity due to excess calories (Multi) 01/05/2016    Obesities, morbid    Other acute postprocedural pain 10/11/2017    Acute post-operative pain    Other acute postprocedural pain 02/06/2015    Other acute postprocedural pain    Ovarian cyst 08/20/2014    Other and unspecified ovarian cysts    Pain in unspecified ankle and joints of unspecified foot 02/06/2015    Ankle joint pain    Personal history of other benign neoplasm 08/20/2014    History of uterine leiomyoma    Personal history of other diseases of the digestive system 10/11/2017    History of hiatal hernia    Personal history of other diseases of the respiratory system 02/06/2015    History of asthma    Personal history of other endocrine, nutritional and metabolic disease 10/22/2014    History of hyperlipidemia    Personal history of other endocrine, nutritional and metabolic disease 09/11/2017    History of diabetes mellitus    Personal history of other endocrine, nutritional and metabolic disease 01/05/2016    History of diabetes mellitus    Personal history of other infectious and parasitic diseases 12/13/2018    History of Clostridioides difficile colitis    Personal history of other specified conditions 07/22/2019    History of diarrhea    Personal history of other  specified conditions 12/13/2017    History of postoperative nausea and vomiting        Surgical History  Past Surgical History:   Procedure Laterality Date    COLONOSCOPY  04/2021    rpt 4-31    HERNIA REPAIR  04/19/2016    Hernia Repair    HYSTERECTOMY  02/12/2014    Hysterectomy    OTHER SURGICAL HISTORY  08/20/2014    Ovarian Cystectomy    OTHER SURGICAL HISTORY  08/20/2014    Laparoscopy Myomectomy    OTHER SURGICAL HISTORY  05/04/2016    Gastric Surgery For Morbid Obesity Laparoscopic Longitudinal Gastrectomy         Social History  She reports that she has been smoking cigarettes. She has a 7.5 pack-year smoking history. She has never used smokeless tobacco. She reports that she does not currently use alcohol. She reports that she does not use drugs.    Family History  Family History   Problem Relation Name Age of Onset    Hypertension Mother      Cancer Mother      Hypertension Father      Heart failure Father      Breast cancer Mother's Sister  77        Allergies  Aspirin, Iodides, Iodinated contrast media, Iodine, Nut - unspecified, Penicillins, and Shellfish containing products    Review of Systems   Constitutional:  Positive for activity change, appetite change and fatigue.   HENT: Negative.     Eyes: Negative.    Respiratory: Negative.     Cardiovascular: Negative.    Gastrointestinal:  Positive for abdominal pain, diarrhea, nausea and vomiting.   Endocrine: Negative.    Genitourinary: Negative.    Musculoskeletal: Negative.    Skin: Negative.    Allergic/Immunologic: Negative.    Neurological: Negative.    Hematological: Negative.    Psychiatric/Behavioral: Negative.     All other systems reviewed and are negative.       Physical Exam  Vitals and nursing note reviewed.   Constitutional:       Appearance: Normal appearance. She is obese.   HENT:      Head: Normocephalic.      Right Ear: External ear normal.      Left Ear: External ear normal.      Nose: Nose normal.      Mouth/Throat:      Mouth: Mucous  "membranes are dry.      Pharynx: Oropharynx is clear.   Eyes:      Extraocular Movements: Extraocular movements intact.      Conjunctiva/sclera: Conjunctivae normal.      Pupils: Pupils are equal, round, and reactive to light.   Cardiovascular:      Rate and Rhythm: Normal rate and regular rhythm.   Pulmonary:      Effort: Pulmonary effort is normal.      Breath sounds: Normal breath sounds.   Abdominal:      General: Abdomen is flat. Bowel sounds are normal.      Palpations: Abdomen is soft.      Tenderness: There is abdominal tenderness.   Musculoskeletal:         General: Normal range of motion.   Skin:     General: Skin is warm and dry.   Neurological:      General: No focal deficit present.      Mental Status: She is alert. Mental status is at baseline.   Psychiatric:         Mood and Affect: Mood normal.         Behavior: Behavior normal.          Last Recorded Vitals  Blood pressure (!) 181/69, pulse 94, temperature 37 °C (98.6 °F), resp. rate 18, height 1.575 m (5' 2\"), weight 91.6 kg (202 lb), SpO2 97%.    Relevant Results  Meds:  Scheduled medications  budesonide, 0.5 mg, nebulization, BID  cyanocobalamin, 1,000 mcg, oral, Daily  enoxaparin, 40 mg, subcutaneous, q24h JAMEL  folic acid, 1 mg, oral, Daily  formoterol, 20 mcg, nebulization, BID  multivitamin with minerals, 1 tablet, oral, Daily  pantoprazole, 40 mg, intravenous, BID  thiamine, 100 mg, oral, Daily      Continuous medications  lactated Ringer's, 100 mL/hr      PRN medications  PRN medications: acetaminophen **OR** acetaminophen **OR** acetaminophen, albuterol, ondansetron **OR** ondansetron   Current Outpatient Medications   Medication Instructions    albuterol (ProAir HFA) 90 mcg/actuation inhaler 1-2 puffs    atorvastatin (Lipitor) 20 mg tablet TAKE 1 TABLET BY MOUTH EVERY DAY    budesonide-formoteroL (Symbicort) 160-4.5 mcg/actuation inhaler 2 puffs, 2 times daily    cyclobenzaprine (FLEXERIL) 10 mg, oral, Nightly PRN    losartan (COZAAR) 50 " mg, oral, Daily    multivit-min/iron/folic acid/K (BARIATRIC MULTIVITAMINS ORAL) 1 tablet, oral, Daily    omeprazole (PriLOSEC) 40 mg DR capsule TAKE 1 CAPSULE BY MOUTH EVERY DAY        Labs:  Results for orders placed or performed during the hospital encounter of 04/15/25 (from the past 24 hours)   CBC and Auto Differential   Result Value Ref Range    WBC 15.7 (H) 4.4 - 11.3 x10*3/uL    nRBC 0.0 0.0 - 0.0 /100 WBCs    RBC 4.55 4.00 - 5.20 x10*6/uL    Hemoglobin 13.6 12.0 - 16.0 g/dL    Hematocrit 42.7 36.0 - 46.0 %    MCV 94 80 - 100 fL    MCH 29.9 26.0 - 34.0 pg    MCHC 31.9 (L) 32.0 - 36.0 g/dL    RDW 13.2 11.5 - 14.5 %    Platelets 263 150 - 450 x10*3/uL    Neutrophils % 68.2 40.0 - 80.0 %    Immature Granulocytes %, Automated 0.3 0.0 - 0.9 %    Lymphocytes % 22.1 13.0 - 44.0 %    Monocytes % 7.8 2.0 - 10.0 %    Eosinophils % 1.0 0.0 - 6.0 %    Basophils % 0.6 0.0 - 2.0 %    Neutrophils Absolute 10.71 (H) 1.20 - 7.70 x10*3/uL    Immature Granulocytes Absolute, Automated 0.05 0.00 - 0.70 x10*3/uL    Lymphocytes Absolute 3.46 1.20 - 4.80 x10*3/uL    Monocytes Absolute 1.22 (H) 0.10 - 1.00 x10*3/uL    Eosinophils Absolute 0.16 0.00 - 0.70 x10*3/uL    Basophils Absolute 0.09 0.00 - 0.10 x10*3/uL   Comprehensive metabolic panel   Result Value Ref Range    Glucose 95 74 - 99 mg/dL    Sodium 139 136 - 145 mmol/L    Potassium 3.9 3.5 - 5.3 mmol/L    Chloride 105 98 - 107 mmol/L    Bicarbonate 25 21 - 32 mmol/L    Anion Gap 13 10 - 20 mmol/L    Urea Nitrogen 11 6 - 23 mg/dL    Creatinine 0.70 0.50 - 1.05 mg/dL    eGFR >90 >60 mL/min/1.73m*2    Calcium 9.5 8.6 - 10.3 mg/dL    Albumin 4.3 3.4 - 5.0 g/dL    Alkaline Phosphatase 124 (H) 33 - 110 U/L    Total Protein 7.8 6.4 - 8.2 g/dL    AST 15 9 - 39 U/L    Bilirubin, Total 0.6 0.0 - 1.2 mg/dL    ALT 19 7 - 45 U/L   Lipase   Result Value Ref Range    Lipase 43 9 - 82 U/L   Lactate   Result Value Ref Range    Lactate 1.4 0.4 - 2.0 mmol/L      Imaging:  Imaging  CT abdomen  pelvis wo IV contrast    Result Date: 4/15/2025  1.  Suspect inflammation involving the jejunal portion of the gastrojejunostomy with potential ulceration. 2. There is a focal gas-filled component along the upper inner margin which appears to been present on prior examination potentially blind-ending portion of bowel. Focal ulceration could have a similar appearance. Attention recommended on follow-up. 3. Newly seen mild superior compressive deformity T9 vertebra, age indeterminate although not obviously acute. Attention recommended on clinical assessment. 4. Similar borderline caliber of the bile ducts.     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  Possible EGD in a.m.  Lactated Ringer's at 100 mL an hour    T9 compression fracture  Pain control      Obesity BMI of 36.9, class II.  History of morbid obesity s/p Nadja-en-Y gastric bypass  Plan:  Multivitamin with minerals 1 daily  Thiamine 100 mg orally daily  Vitamin B12 1000 mcg orally daily  Folic acid 1 mg orally daily    Hypertension  Plan:  Losartan 50 mg orally daily    Hyperlipidemia  Plan:  Atorvastatin 20 mg orally daily    DVT prophylaxis  Lovenox 40 mg subcutaneously daily  SCDs    I spent 60 minutes in the professional and overall care of this patient.      Arthur Reyes DO

## 2025-04-16 NOTE — PROGRESS NOTES
Brief documentation on behalf of Humberto Quiles, PGY1 community-based pharmacy resident.    Humberto spoke to this patient at bedside prior to discharge as patient was identified as being eligible for pharmacy platinum services post-discharge for a diabetes diagnosis. Patient reportedly denies having diabetes, stating it is an old diagnosis that was never taken out of her chart.    Lab Results   Component Value Date    HGBA1C 7.0 (A) 04/15/2025     There are cases where the A1c is not an accurate reflection of glycemic control, such as when patients have very low iron or hemoglobin variants. This patient's A1c value of 7% taken yesterday is in the diabetic range, but all hospital measures yesterday and today of blood glucose were <100 mg/dL.    Patient would benefit from follow-up education regarding glycemic control through lifestyle measures, the disease progression of diabetes, and long-term complications of uncontrolled blood glucose.     Marisel Ta, ShyannD

## 2025-04-16 NOTE — CONSULTS
Reason For Consult  ANASTOMOTIC uLCER    History Of Present Illness  Yuni Jordan is a 54 y.o. female with h/o obesity, asthma, Nadja-en-Y gastric bypass, h/o anastomotic ulcers, presented with complaint of worsening epigastric abdominal pain associated nausea and vomiting and some episodes of diarrhea x 2 to 3 days.  The pain is located in the epigastric area and left upper quadrant.  Complains of odynophagia, nausea, vomiting without hematemesis.  Stated she has not been eating much since Saturday.  No melena or hematochezia.  Stated she had similar pain when she had ulcers.  She takes omeprazole as needed, usually 3 times a week, denies using NSAIDs, has not been using Carafate due to price. Labs on admission show normal renal function, elevated alk phos, other liver enzymes and bilirubin within normal limits, WBC 15.7, H&H 13.6 and 42.7, platelets 263.  CT showed inflammation involving the jejunal portion of the gastrojejunostomy with potential ulceration;  a focal gas-filled component along the upper inner margin  which appears to been present on prior examination potentially blind-ending portion of bowel.       EGD 10/6/2023 for epigastric pain, nausea and vomiting  Mildly severe reflux esophagitis with no bleeding  Gastritis  Normal examined jejunum.  STOMACH, ANTRUM, BIOPSY:  - OXYNTIC MUCOSA WITH NO SIGNIFICANT ABNORMALITY.  - NEGATIVE FOR HELICOBACTER.    EGD 5/26/21 for dysphagia, weight gain after bariatric surgery  - Small hiatal hernia.  - Gastric bypass with a pouch 5 cm in length and intact staple line. 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 Traversed with scope.  - No specimens collected.    EGD 4/14/2021 for dysphagia  Mildly severe reflux esophagitis with no bleeding  Gastritis  Gastric stenosis was found in the gastric body  No specimen collected.  Colonoscopy 4/14/2021 4 diminutive polyps removed from the rectum and sigmoid colon.  Biopsy showed  hyperplastic polyps  Internal hemorrhoids    Past Medical History  She has a past medical history of Abnormal weight loss (12/13/2017), Acute vaginitis (07/22/2019), Morbid (severe) obesity due to excess calories (Multi) (01/05/2016), Other acute postprocedural pain (10/11/2017), Other acute postprocedural pain (02/06/2015), Ovarian cyst (08/20/2014), Pain in unspecified ankle and joints of unspecified foot (02/06/2015), Personal history of other benign neoplasm (08/20/2014), Personal history of other diseases of the digestive system (10/11/2017), Personal history of other diseases of the respiratory system (02/06/2015), Personal history of other endocrine, nutritional and metabolic disease (10/22/2014), Personal history of other endocrine, nutritional and metabolic disease (09/11/2017), Personal history of other endocrine, nutritional and metabolic disease (01/05/2016), Personal history of other infectious and parasitic diseases (12/13/2018), Personal history of other specified conditions (07/22/2019), and Personal history of other specified conditions (12/13/2017).    Surgical History  She has a past surgical history that includes Hysterectomy (02/12/2014); Hernia repair (04/19/2016); Other surgical history (08/20/2014); Other surgical history (08/20/2014); Other surgical history (05/04/2016); and Colonoscopy (04/2021).     Social History  Smokes 2-3 [place] cigars, occasional alcohol use, most recently she was drinking on Friday.  Denies illicit drug use.      Family History  Family History[1]  Denies any known GI malignancies    Allergies  Aspirin, Iodides, Iodinated contrast media, Iodine, Nut - unspecified, Penicillins, and Shellfish containing products    Review of Systems  10 systems reviewed and negative other than HPI     Physical Exam  Physical Exam  Vitals reviewed.   Constitutional:       Appearance: Normal appearance.   HENT:      Head: Normocephalic and atraumatic.      Nose: Nose normal.       "Mouth/Throat:      Mouth: Mucous membranes are moist.      Pharynx: Oropharynx is clear.   Cardiovascular:      Rate and Rhythm: Normal rate and regular rhythm.      Heart sounds: Normal heart sounds.   Pulmonary:      Effort: Pulmonary effort is normal.      Breath sounds: Normal breath sounds.   Abdominal:      General: Bowel sounds are normal. There is no distension.      Palpations: Abdomen is soft.      Tenderness: There is generalized abdominal tenderness and tenderness in the left upper quadrant.   Skin:     General: Skin is warm and dry.   Neurological:      General: No focal deficit present.      Mental Status: She is alert and oriented to person, place, and time.   Psychiatric:         Mood and Affect: Mood normal.         Behavior: Behavior normal.            Last Recorded Vitals  Blood pressure 177/75, pulse 86, temperature 36 °C (96.8 °F), temperature source Temporal, resp. rate (!) 116, height 1.575 m (5' 2\"), weight 91.6 kg (202 lb), SpO2 95%.    Relevant Results      Scheduled medications  Scheduled Medications[2]  Continuous medications  Continuous Medications[3]  PRN medications  PRN Medications[4]    CT abdomen pelvis wo IV contrast  Result Date: 4/15/2025  1.  Suspect inflammation involving the jejunal portion of the gastrojejunostomy with potential ulceration. 2. There is a focal gas-filled component along the upper inner margin which appears to been present on prior examination potentially blind-ending portion of bowel. Focal ulceration could have a similar appearance. Attention recommended on follow-up. 3. Newly seen mild superior compressive deformity T9 vertebra, age indeterminate although not obviously acute. Attention recommended on clinical assessment. 4. Similar borderline caliber of the bile ducts.     MACRO: None   Signed by: Jakob Valera 4/15/2025 4:40 PM Dictation workstation:   [id number]    Results for orders placed or performed during the hospital encounter of 04/15/25 (from " the past 24 hours)   CBC and Auto Differential   Result Value Ref Range    WBC 15.7 (H) 4.4 - 11.3 x10*3/uL    nRBC 0.0 0.0 - 0.0 /100 WBCs    RBC 4.55 4.00 - 5.20 x10*6/uL    Hemoglobin 13.6 12.0 - 16.0 g/dL    Hematocrit 42.7 36.0 - 46.0 %    MCV 94 80 - 100 fL    MCH 29.9 26.0 - 34.0 pg    MCHC 31.9 (L) 32.0 - 36.0 g/dL    RDW 13.2 11.5 - 14.5 %    Platelets 263 150 - 450 x10*3/uL    Neutrophils % 68.2 40.0 - 80.0 %    Immature Granulocytes %, Automated 0.3 0.0 - 0.9 %    Lymphocytes % 22.1 13.0 - 44.0 %    Monocytes % 7.8 2.0 - 10.0 %    Eosinophils % 1.0 0.0 - 6.0 %    Basophils % 0.6 0.0 - 2.0 %    Neutrophils Absolute 10.71 (H) 1.20 - 7.70 x10*3/uL    Immature Granulocytes Absolute, Automated 0.05 0.00 - 0.70 x10*3/uL    Lymphocytes Absolute 3.46 1.20 - 4.80 x10*3/uL    Monocytes Absolute 1.22 (H) 0.10 - 1.00 x10*3/uL    Eosinophils Absolute 0.16 0.00 - 0.70 x10*3/uL    Basophils Absolute 0.09 0.00 - 0.10 x10*3/uL   Comprehensive metabolic panel   Result Value Ref Range    Glucose 95 74 - 99 mg/dL    Sodium 139 136 - 145 mmol/L    Potassium 3.9 3.5 - 5.3 mmol/L    Chloride 105 98 - 107 mmol/L    Bicarbonate 25 21 - 32 mmol/L    Anion Gap 13 10 - 20 mmol/L    Urea Nitrogen 11 6 - 23 mg/dL    Creatinine 0.70 0.50 - 1.05 mg/dL    eGFR >90 >60 mL/min/1.73m*2    Calcium 9.5 8.6 - 10.3 mg/dL    Albumin 4.3 3.4 - 5.0 g/dL    Alkaline Phosphatase 124 (H) 33 - 110 U/L    Total Protein 7.8 6.4 - 8.2 g/dL    AST 15 9 - 39 U/L    Bilirubin, Total 0.6 0.0 - 1.2 mg/dL    ALT 19 7 - 45 U/L   Lipase   Result Value Ref Range    Lipase 43 9 - 82 U/L   Lactate   Result Value Ref Range    Lactate 1.4 0.4 - 2.0 mmol/L   CBC   Result Value Ref Range    WBC 13.0 (H) 4.4 - 11.3 x10*3/uL    nRBC 0.0 0.0 - 0.0 /100 WBCs    RBC 3.97 (L) 4.00 - 5.20 x10*6/uL    Hemoglobin 12.1 12.0 - 16.0 g/dL    Hematocrit 37.8 36.0 - 46.0 %    MCV 95 80 - 100 fL    MCH 30.5 26.0 - 34.0 pg    MCHC 32.0 32.0 - 36.0 g/dL    RDW 13.4 11.5 - 14.5 %     Platelets 224 150 - 450 x10*3/uL   Basic metabolic panel   Result Value Ref Range    Glucose 92 74 - 99 mg/dL    Sodium 138 136 - 145 mmol/L    Potassium 3.6 3.5 - 5.3 mmol/L    Chloride 107 98 - 107 mmol/L    Bicarbonate 22 21 - 32 mmol/L    Anion Gap 13 10 - 20 mmol/L    Urea Nitrogen 11 6 - 23 mg/dL    Creatinine 0.64 0.50 - 1.05 mg/dL    eGFR >90 >60 mL/min/1.73m*2    Calcium 8.7 8.6 - 10.3 mg/dL          Assessment/Plan     54 y.o. female with h/o obesity, asthma, Nadja-en-Y gastric bypass, h/o anastomotic ulcers, presented with c/o worsening RUQ and epigastric abdominal pain associated nausea and vomiting without hematemesis, for 3 days; imaging suggestive of inflammation involving the jejunal portion of the gastrojejunostomy with potential ulceration.    -Recommend to continue twice daily PPI  - Discussed with Dr. Cash, plan for EGD today  - Patient strongly advised to quit smoking and alcohol use.  Further recommendations pending EGD results.    Helene Zamora, APRN-CNP         [1]   Family History  Problem Relation Name Age of Onset    Hypertension Mother      Cancer Mother      Hypertension Father      Heart failure Father      Breast cancer Mother's Sister  77   [2] atorvastatin, 20 mg, oral, Nightly  cyanocobalamin, 1,000 mcg, oral, Daily  enoxaparin, 40 mg, subcutaneous, q24h JAMEL  folic acid, 1 mg, oral, Daily  losartan, 50 mg, oral, Daily  multivitamin with minerals, 1 tablet, oral, Daily  pantoprazole, 40 mg, intravenous, BID  thiamine, 100 mg, oral, Daily  [3] lactated Ringer's, 100 mL/hr  [4] PRN medications: acetaminophen **OR** acetaminophen **OR** acetaminophen, albuterol, budesonide, formoterol, morphine, ondansetron **OR** ondansetron

## 2025-04-17 ENCOUNTER — PATIENT OUTREACH (OUTPATIENT)
Dept: PRIMARY CARE | Facility: CLINIC | Age: 55
End: 2025-04-17
Payer: COMMERCIAL

## 2025-04-17 NOTE — PROGRESS NOTES
Discharge Facility:  [facility]  Discharge Diagnosis: gastrojejunostomy ulcer, s/p Nadja-en-y gastric bypass surgery, HTN, HLD, obesity  Admission Date: 4/15/25  Discharge Date:  4/16/25    PCP Appointment Date: TBD - tasked to PCP office  Specialist Appointment Date:  TBD - GI  Hospital Encounter and Summary Linked: Yes  ED to Hosp-Admission (Discharged) with Terry Gutierrez DO; John Packer MD (04/15/2025)   See discharge assessment below for further details     Wrap Up  Wrap Up Additional Comments: Successful transitions of care outreach to patient.  Patient reports she is doing a little better than when she went in, but still having some abdominal pain and continued trouble eating, which has been an ongoing problem.  Reviewed new medications and discontinued medications with her. She has no questions regarding her medications. Patient reports having these problems since her gastric bypass surgery approx 2017. - She is aware EGD needs to be repeated in two months. She went online to schedule her GI appt and soonest available was in July.  Encouraged her to call the number for referral scheduling and explain that she was seen by GI in the hospital and is supposed to follow up outpatient with an EGD in two months. She is agreeable to this and took the number. Patient also questioned her A1C of 7.0.  Does not understand this as she eats very little. Encouraged her to speak with PCP about this. Offered her PCP appt for next week, however she is unable to come in for morning appts due to work.  Message sent to PCP office to see if she can be fit in anytime after 2:30PM.  Patient had no other questions or concerns at this time. (4/17/2025 11:54 AM)    Engagement  Call Start Time: 1142 (4/17/2025 11:54 AM)    Medications  Medications reviewed with patient/caregiver?: Yes (4/17/2025 11:54 AM)  Is the patient having any side effects they believe may be caused by any medication additions or changes?: No  (4/17/2025 11:54 AM)  Does the patient have all medications ordered at discharge?: Yes (4/17/2025 11:54 AM)  Care Management Interventions: No intervention needed (4/17/2025 11:54 AM)  Prescription Comments: Start: pantoprazole 40mg two times a day before meals for 30 days and then 1 tablet once daily in the morning.    Tab-A-Narendra Multivitamin with iron 18-400mg-mcg 1 tablet once daily (replaces bariatric Multivitamins)  Stop: Bariatric Multivitamins Oral; omeprazole (4/17/2025 11:54 AM)  Is the patient taking all medications as directed (includes completed medication regime)?: Yes (4/17/2025 11:54 AM)  Medication Comments: Reviewed new and dc'ed medications with patient. She had no questions related to her medications. (4/17/2025 11:54 AM)    Appointments  Does the patient have a primary care provider?: Yes (4/17/2025 11:54 AM)  Care Management Interventions: Educated patient on importance of making appointment (4/17/2025 11:54 AM)  Has the patient kept scheduled appointments due by today?: Not applicable (4/17/2025 11:54 AM)    Self Management  Has home health visited the patient within 72 hours of discharge?: Not applicable (4/17/2025 11:54 AM)    Patient Teaching  Does the patient have access to their discharge instructions?: Yes (4/17/2025 11:54 AM)  Care Management Interventions: Reviewed instructions with patient (4/17/2025 11:54 AM)  What is the patient's perception of their health status since discharge?: Improving (4/17/2025 11:54 AM)  Is the patient/caregiver able to teach back the hierarchy of who to call/visit for symptoms/problems? PCP, Specialist, Home Health nurse, Urgent Care, ED, 911: Yes (4/17/2025 11:54 AM)

## 2025-04-18 ENCOUNTER — APPOINTMENT (OUTPATIENT)
Dept: RADIOLOGY | Facility: HOSPITAL | Age: 55
DRG: 417 | End: 2025-04-18
Payer: COMMERCIAL

## 2025-04-18 ENCOUNTER — APPOINTMENT (OUTPATIENT)
Dept: CARDIOLOGY | Facility: HOSPITAL | Age: 55
DRG: 417 | End: 2025-04-18
Payer: COMMERCIAL

## 2025-04-18 ENCOUNTER — PREP FOR PROCEDURE (OUTPATIENT)
Dept: SURGERY | Facility: HOSPITAL | Age: 55
End: 2025-04-18

## 2025-04-18 ENCOUNTER — ANESTHESIA (OUTPATIENT)
Dept: OPERATING ROOM | Facility: HOSPITAL | Age: 55
End: 2025-04-18
Payer: COMMERCIAL

## 2025-04-18 ENCOUNTER — ANESTHESIA EVENT (OUTPATIENT)
Dept: OPERATING ROOM | Facility: HOSPITAL | Age: 55
End: 2025-04-18
Payer: COMMERCIAL

## 2025-04-18 ENCOUNTER — TELEMEDICINE (OUTPATIENT)
Dept: PRIMARY CARE | Facility: CLINIC | Age: 55
End: 2025-04-18
Payer: COMMERCIAL

## 2025-04-18 ENCOUNTER — HOSPITAL ENCOUNTER (INPATIENT)
Facility: HOSPITAL | Age: 55
DRG: 417 | End: 2025-04-18
Attending: EMERGENCY MEDICINE | Admitting: STUDENT IN AN ORGANIZED HEALTH CARE EDUCATION/TRAINING PROGRAM
Payer: COMMERCIAL

## 2025-04-18 DIAGNOSIS — I10 BENIGN ESSENTIAL HYPERTENSION: ICD-10-CM

## 2025-04-18 DIAGNOSIS — K63.3 INTESTINAL ULCERATION: ICD-10-CM

## 2025-04-18 DIAGNOSIS — T81.43XA ABSCESS, INTRA-ABDOMINAL, POSTOPERATIVE: ICD-10-CM

## 2025-04-18 DIAGNOSIS — E78.2 MIXED HYPERLIPIDEMIA: ICD-10-CM

## 2025-04-18 DIAGNOSIS — K65.1 ABSCESS, INTRA-ABDOMINAL, POSTOPERATIVE: ICD-10-CM

## 2025-04-18 DIAGNOSIS — K81.0 ACUTE CHOLECYSTITIS: Primary | ICD-10-CM

## 2025-04-18 DIAGNOSIS — K28.9 ULCER, ANASTOMOTIC: Primary | ICD-10-CM

## 2025-04-18 DIAGNOSIS — I10 HYPERTENSION, UNCONTROLLED: ICD-10-CM

## 2025-04-18 PROBLEM — K81.9 CHOLECYSTITIS: Status: ACTIVE | Noted: 2025-04-18

## 2025-04-18 LAB
ALBUMIN SERPL BCP-MCNC: 4.4 G/DL (ref 3.4–5)
ALP SERPL-CCNC: 114 U/L (ref 33–110)
ALT SERPL W P-5'-P-CCNC: 14 U/L (ref 7–45)
ANION GAP SERPL CALC-SCNC: 17 MMOL/L (ref 10–20)
AST SERPL W P-5'-P-CCNC: 16 U/L (ref 9–39)
BASOPHILS # BLD AUTO: 0.04 X10*3/UL (ref 0–0.1)
BASOPHILS NFR BLD AUTO: 0.2 %
BILIRUB SERPL-MCNC: 0.7 MG/DL (ref 0–1.2)
BUN SERPL-MCNC: 13 MG/DL (ref 6–23)
CALCIUM SERPL-MCNC: 9.4 MG/DL (ref 8.6–10.3)
CARDIAC TROPONIN I PNL SERPL HS: 7 NG/L (ref 0–13)
CHLORIDE SERPL-SCNC: 104 MMOL/L (ref 98–107)
CO2 SERPL-SCNC: 21 MMOL/L (ref 21–32)
CREAT SERPL-MCNC: 0.7 MG/DL (ref 0.5–1.05)
EGFRCR SERPLBLD CKD-EPI 2021: >90 ML/MIN/1.73M*2
EOSINOPHIL # BLD AUTO: 0 X10*3/UL (ref 0–0.7)
EOSINOPHIL NFR BLD AUTO: 0 %
ERYTHROCYTE [DISTWIDTH] IN BLOOD BY AUTOMATED COUNT: 13.2 % (ref 11.5–14.5)
GLUCOSE BLD MANUAL STRIP-MCNC: 179 MG/DL (ref 74–99)
GLUCOSE BLD MANUAL STRIP-MCNC: 193 MG/DL (ref 74–99)
GLUCOSE BLD MANUAL STRIP-MCNC: 220 MG/DL (ref 74–99)
GLUCOSE SERPL-MCNC: 167 MG/DL (ref 74–99)
HCT VFR BLD AUTO: 40.8 % (ref 36–46)
HGB BLD-MCNC: 13.6 G/DL (ref 12–16)
IMM GRANULOCYTES # BLD AUTO: 0.16 X10*3/UL (ref 0–0.7)
IMM GRANULOCYTES NFR BLD AUTO: 0.6 % (ref 0–0.9)
LIPASE SERPL-CCNC: 15 U/L (ref 9–82)
LYMPHOCYTES # BLD AUTO: 0.92 X10*3/UL (ref 1.2–4.8)
LYMPHOCYTES NFR BLD AUTO: 3.6 %
MCH RBC QN AUTO: 30.6 PG (ref 26–34)
MCHC RBC AUTO-ENTMCNC: 33.3 G/DL (ref 32–36)
MCV RBC AUTO: 92 FL (ref 80–100)
MONOCYTES # BLD AUTO: 1.72 X10*3/UL (ref 0.1–1)
MONOCYTES NFR BLD AUTO: 6.7 %
NEUTROPHILS # BLD AUTO: 22.77 X10*3/UL (ref 1.2–7.7)
NEUTROPHILS NFR BLD AUTO: 88.9 %
NRBC BLD-RTO: 0 /100 WBCS (ref 0–0)
PLATELET # BLD AUTO: 269 X10*3/UL (ref 150–450)
POTASSIUM SERPL-SCNC: 3.9 MMOL/L (ref 3.5–5.3)
PROT SERPL-MCNC: 7.4 G/DL (ref 6.4–8.2)
RBC # BLD AUTO: 4.45 X10*6/UL (ref 4–5.2)
SODIUM SERPL-SCNC: 138 MMOL/L (ref 136–145)
WBC # BLD AUTO: 25.6 X10*3/UL (ref 4.4–11.3)

## 2025-04-18 PROCEDURE — 2720000007 HC OR 272 NO HCPCS: Performed by: SURGERY

## 2025-04-18 PROCEDURE — 87116 MYCOBACTERIA CULTURE: CPT | Mod: AHULAB | Performed by: SURGERY

## 2025-04-18 PROCEDURE — 74176 CT ABD & PELVIS W/O CONTRAST: CPT

## 2025-04-18 PROCEDURE — 3600000004 HC OR TIME - INITIAL BASE CHARGE - PROCEDURE LEVEL FOUR: Performed by: SURGERY

## 2025-04-18 PROCEDURE — 2500000004 HC RX 250 GENERAL PHARMACY W/ HCPCS (ALT 636 FOR OP/ED): Performed by: EMERGENCY MEDICINE

## 2025-04-18 PROCEDURE — 2500000004 HC RX 250 GENERAL PHARMACY W/ HCPCS (ALT 636 FOR OP/ED): Performed by: PHYSICIAN ASSISTANT

## 2025-04-18 PROCEDURE — 7100000002 HC RECOVERY ROOM TIME - EACH INCREMENTAL 1 MINUTE: Performed by: SURGERY

## 2025-04-18 PROCEDURE — 3600000009 HC OR TIME - EACH INCREMENTAL 1 MINUTE - PROCEDURE LEVEL FOUR: Performed by: SURGERY

## 2025-04-18 PROCEDURE — G0378 HOSPITAL OBSERVATION PER HR: HCPCS

## 2025-04-18 PROCEDURE — 99285 EMERGENCY DEPT VISIT HI MDM: CPT | Mod: 25 | Performed by: EMERGENCY MEDICINE

## 2025-04-18 PROCEDURE — 36415 COLL VENOUS BLD VENIPUNCTURE: CPT | Performed by: PHYSICIAN ASSISTANT

## 2025-04-18 PROCEDURE — 85025 COMPLETE CBC W/AUTO DIFF WBC: CPT | Performed by: PHYSICIAN ASSISTANT

## 2025-04-18 PROCEDURE — 2500000005 HC RX 250 GENERAL PHARMACY W/O HCPCS: Performed by: ANESTHESIOLOGY

## 2025-04-18 PROCEDURE — 3051F HG A1C>EQUAL 7.0%<8.0%: CPT | Performed by: INTERNAL MEDICINE

## 2025-04-18 PROCEDURE — 99221 1ST HOSP IP/OBS SF/LOW 40: CPT | Performed by: NURSE PRACTITIONER

## 2025-04-18 PROCEDURE — 96376 TX/PRO/DX INJ SAME DRUG ADON: CPT

## 2025-04-18 PROCEDURE — 2500000004 HC RX 250 GENERAL PHARMACY W/ HCPCS (ALT 636 FOR OP/ED): Mod: JZ | Performed by: INTERNAL MEDICINE

## 2025-04-18 PROCEDURE — 96374 THER/PROPH/DIAG INJ IV PUSH: CPT

## 2025-04-18 PROCEDURE — 2500000004 HC RX 250 GENERAL PHARMACY W/ HCPCS (ALT 636 FOR OP/ED): Mod: JZ | Performed by: ANESTHESIOLOGY

## 2025-04-18 PROCEDURE — 0FT44ZZ RESECTION OF GALLBLADDER, PERCUTANEOUS ENDOSCOPIC APPROACH: ICD-10-PCS | Performed by: SURGERY

## 2025-04-18 PROCEDURE — 2500000004 HC RX 250 GENERAL PHARMACY W/ HCPCS (ALT 636 FOR OP/ED): Mod: JZ

## 2025-04-18 PROCEDURE — 4004F PT TOBACCO SCREEN RCVD TLK: CPT | Performed by: INTERNAL MEDICINE

## 2025-04-18 PROCEDURE — 7100000001 HC RECOVERY ROOM TIME - INITIAL BASE CHARGE: Performed by: SURGERY

## 2025-04-18 PROCEDURE — 93005 ELECTROCARDIOGRAM TRACING: CPT

## 2025-04-18 PROCEDURE — 3700000002 HC GENERAL ANESTHESIA TIME - EACH INCREMENTAL 1 MINUTE: Performed by: SURGERY

## 2025-04-18 PROCEDURE — 88304 TISSUE EXAM BY PATHOLOGIST: CPT | Mod: TC,AHULAB | Performed by: SURGERY

## 2025-04-18 PROCEDURE — 88304 TISSUE EXAM BY PATHOLOGIST: CPT | Performed by: STUDENT IN AN ORGANIZED HEALTH CARE EDUCATION/TRAINING PROGRAM

## 2025-04-18 PROCEDURE — 96375 TX/PRO/DX INJ NEW DRUG ADDON: CPT

## 2025-04-18 PROCEDURE — 2500000005 HC RX 250 GENERAL PHARMACY W/O HCPCS: Performed by: SURGERY

## 2025-04-18 PROCEDURE — 83690 ASSAY OF LIPASE: CPT | Performed by: PHYSICIAN ASSISTANT

## 2025-04-18 PROCEDURE — 82947 ASSAY GLUCOSE BLOOD QUANT: CPT

## 2025-04-18 PROCEDURE — 3700000001 HC GENERAL ANESTHESIA TIME - INITIAL BASE CHARGE: Performed by: SURGERY

## 2025-04-18 PROCEDURE — 99232 SBSQ HOSP IP/OBS MODERATE 35: CPT | Performed by: SURGERY

## 2025-04-18 PROCEDURE — 74176 CT ABD & PELVIS W/O CONTRAST: CPT | Performed by: STUDENT IN AN ORGANIZED HEALTH CARE EDUCATION/TRAINING PROGRAM

## 2025-04-18 PROCEDURE — 87077 CULTURE AEROBIC IDENTIFY: CPT | Mod: AHULAB | Performed by: SURGERY

## 2025-04-18 PROCEDURE — 2780000003 HC OR 278 NO HCPCS: Performed by: SURGERY

## 2025-04-18 PROCEDURE — 87102 FUNGUS ISOLATION CULTURE: CPT | Mod: AHULAB | Performed by: SURGERY

## 2025-04-18 PROCEDURE — 99221 1ST HOSP IP/OBS SF/LOW 40: CPT | Performed by: SURGERY

## 2025-04-18 PROCEDURE — 84484 ASSAY OF TROPONIN QUANT: CPT | Performed by: PHYSICIAN ASSISTANT

## 2025-04-18 PROCEDURE — 2500000004 HC RX 250 GENERAL PHARMACY W/ HCPCS (ALT 636 FOR OP/ED): Performed by: SURGERY

## 2025-04-18 PROCEDURE — 80053 COMPREHEN METABOLIC PANEL: CPT | Performed by: PHYSICIAN ASSISTANT

## 2025-04-18 PROCEDURE — 99496 TRANSJ CARE MGMT HIGH F2F 7D: CPT | Performed by: INTERNAL MEDICINE

## 2025-04-18 PROCEDURE — 47562 LAPAROSCOPIC CHOLECYSTECTOMY: CPT | Performed by: SURGERY

## 2025-04-18 RX ORDER — METRONIDAZOLE 500 MG/100ML
500 INJECTION, SOLUTION INTRAVENOUS EVERY 8 HOURS
Status: DISCONTINUED | OUTPATIENT
Start: 2025-04-19 | End: 2025-04-27

## 2025-04-18 RX ORDER — ONDANSETRON HYDROCHLORIDE 2 MG/ML
INJECTION, SOLUTION INTRAVENOUS AS NEEDED
Status: DISCONTINUED | OUTPATIENT
Start: 2025-04-18 | End: 2025-04-18

## 2025-04-18 RX ORDER — DOCUSATE SODIUM 100 MG/1
100 CAPSULE, LIQUID FILLED ORAL 2 TIMES DAILY
Status: DISCONTINUED | OUTPATIENT
Start: 2025-04-18 | End: 2025-04-20

## 2025-04-18 RX ORDER — DROPERIDOL 2.5 MG/ML
0.62 INJECTION, SOLUTION INTRAMUSCULAR; INTRAVENOUS ONCE AS NEEDED
Status: DISCONTINUED | OUTPATIENT
Start: 2025-04-18 | End: 2025-04-18 | Stop reason: HOSPADM

## 2025-04-18 RX ORDER — ONDANSETRON HYDROCHLORIDE 2 MG/ML
4 INJECTION, SOLUTION INTRAVENOUS ONCE AS NEEDED
Status: DISCONTINUED | OUTPATIENT
Start: 2025-04-18 | End: 2025-04-18 | Stop reason: HOSPADM

## 2025-04-18 RX ORDER — PROPOFOL 10 MG/ML
INJECTION, EMULSION INTRAVENOUS AS NEEDED
Status: DISCONTINUED | OUTPATIENT
Start: 2025-04-18 | End: 2025-04-18

## 2025-04-18 RX ORDER — SODIUM CHLORIDE, SODIUM LACTATE, POTASSIUM CHLORIDE, CALCIUM CHLORIDE 600; 310; 30; 20 MG/100ML; MG/100ML; MG/100ML; MG/100ML
INJECTION, SOLUTION INTRAVENOUS CONTINUOUS PRN
Status: DISCONTINUED | OUTPATIENT
Start: 2025-04-18 | End: 2025-04-18

## 2025-04-18 RX ORDER — LOSARTAN POTASSIUM 50 MG/1
50 TABLET ORAL DAILY
Status: DISCONTINUED | OUTPATIENT
Start: 2025-04-19 | End: 2025-04-28 | Stop reason: HOSPADM

## 2025-04-18 RX ORDER — SODIUM CHLORIDE 0.9 G/100ML
INJECTION, SOLUTION IRRIGATION AS NEEDED
Status: DISCONTINUED | OUTPATIENT
Start: 2025-04-18 | End: 2025-04-18 | Stop reason: HOSPADM

## 2025-04-18 RX ORDER — MIDAZOLAM HYDROCHLORIDE 1 MG/ML
INJECTION INTRAMUSCULAR; INTRAVENOUS AS NEEDED
Status: DISCONTINUED | OUTPATIENT
Start: 2025-04-18 | End: 2025-04-18

## 2025-04-18 RX ORDER — ACETAMINOPHEN 325 MG/1
650 TABLET ORAL EVERY 4 HOURS PRN
Status: DISCONTINUED | OUTPATIENT
Start: 2025-04-18 | End: 2025-04-22

## 2025-04-18 RX ORDER — MORPHINE SULFATE 2 MG/ML
1 INJECTION, SOLUTION INTRAMUSCULAR; INTRAVENOUS EVERY 4 HOURS PRN
Status: DISCONTINUED | OUTPATIENT
Start: 2025-04-18 | End: 2025-04-28 | Stop reason: HOSPADM

## 2025-04-18 RX ORDER — OXYCODONE HYDROCHLORIDE 5 MG/1
5 TABLET ORAL EVERY 4 HOURS PRN
Refills: 0 | Status: DISCONTINUED | OUTPATIENT
Start: 2025-04-18 | End: 2025-04-28 | Stop reason: HOSPADM

## 2025-04-18 RX ORDER — FENTANYL CITRATE 50 UG/ML
INJECTION, SOLUTION INTRAMUSCULAR; INTRAVENOUS AS NEEDED
Status: DISCONTINUED | OUTPATIENT
Start: 2025-04-18 | End: 2025-04-18

## 2025-04-18 RX ORDER — PANTOPRAZOLE SODIUM 40 MG/1
40 TABLET, DELAYED RELEASE ORAL
Status: DISCONTINUED | OUTPATIENT
Start: 2025-04-19 | End: 2025-04-20

## 2025-04-18 RX ORDER — PANTOPRAZOLE SODIUM 40 MG/10ML
40 INJECTION, POWDER, LYOPHILIZED, FOR SOLUTION INTRAVENOUS DAILY
Status: DISCONTINUED | OUTPATIENT
Start: 2025-04-18 | End: 2025-04-19

## 2025-04-18 RX ORDER — ONDANSETRON HYDROCHLORIDE 2 MG/ML
4 INJECTION, SOLUTION INTRAVENOUS ONCE
Status: COMPLETED | OUTPATIENT
Start: 2025-04-18 | End: 2025-04-18

## 2025-04-18 RX ORDER — ATORVASTATIN CALCIUM 20 MG/1
20 TABLET, FILM COATED ORAL DAILY
Status: DISCONTINUED | OUTPATIENT
Start: 2025-04-19 | End: 2025-04-28 | Stop reason: HOSPADM

## 2025-04-18 RX ORDER — CIPROFLOXACIN 2 MG/ML
400 INJECTION, SOLUTION INTRAVENOUS EVERY 12 HOURS
Status: DISCONTINUED | OUTPATIENT
Start: 2025-04-18 | End: 2025-04-27

## 2025-04-18 RX ORDER — MORPHINE SULFATE 4 MG/ML
4 INJECTION, SOLUTION INTRAMUSCULAR; INTRAVENOUS ONCE
Status: COMPLETED | OUTPATIENT
Start: 2025-04-18 | End: 2025-04-18

## 2025-04-18 RX ORDER — ACETAMINOPHEN 160 MG/5ML
650 SOLUTION ORAL EVERY 4 HOURS PRN
Status: DISCONTINUED | OUTPATIENT
Start: 2025-04-18 | End: 2025-04-22

## 2025-04-18 RX ORDER — ONDANSETRON HYDROCHLORIDE 2 MG/ML
4 INJECTION, SOLUTION INTRAVENOUS EVERY 8 HOURS PRN
Status: DISCONTINUED | OUTPATIENT
Start: 2025-04-18 | End: 2025-04-28 | Stop reason: HOSPADM

## 2025-04-18 RX ORDER — LIDOCAINE HYDROCHLORIDE 20 MG/ML
INJECTION, SOLUTION EPIDURAL; INFILTRATION; INTRACAUDAL; PERINEURAL AS NEEDED
Status: DISCONTINUED | OUTPATIENT
Start: 2025-04-18 | End: 2025-04-18

## 2025-04-18 RX ORDER — ACETAMINOPHEN 325 MG/1
650 TABLET ORAL EVERY 4 HOURS PRN
Status: DISCONTINUED | OUTPATIENT
Start: 2025-04-18 | End: 2025-04-18 | Stop reason: HOSPADM

## 2025-04-18 RX ORDER — ROCURONIUM BROMIDE 10 MG/ML
INJECTION, SOLUTION INTRAVENOUS AS NEEDED
Status: DISCONTINUED | OUTPATIENT
Start: 2025-04-18 | End: 2025-04-18

## 2025-04-18 RX ORDER — DIPHENHYDRAMINE HYDROCHLORIDE 50 MG/ML
25 INJECTION, SOLUTION INTRAMUSCULAR; INTRAVENOUS ONCE AS NEEDED
Status: DISCONTINUED | OUTPATIENT
Start: 2025-04-18 | End: 2025-04-18 | Stop reason: HOSPADM

## 2025-04-18 RX ORDER — ONDANSETRON 4 MG/1
4 TABLET, FILM COATED ORAL EVERY 8 HOURS PRN
Status: DISCONTINUED | OUTPATIENT
Start: 2025-04-18 | End: 2025-04-28 | Stop reason: HOSPADM

## 2025-04-18 RX ORDER — POLYETHYLENE GLYCOL 3350 17 G/17G
17 POWDER, FOR SOLUTION ORAL DAILY PRN
Status: DISCONTINUED | OUTPATIENT
Start: 2025-04-18 | End: 2025-04-22

## 2025-04-18 RX ORDER — METRONIDAZOLE 500 MG/100ML
500 INJECTION, SOLUTION INTRAVENOUS ONCE
Status: COMPLETED | OUTPATIENT
Start: 2025-04-18 | End: 2025-04-18

## 2025-04-18 RX ORDER — CIPROFLOXACIN 2 MG/ML
INJECTION, SOLUTION INTRAVENOUS AS NEEDED
Status: DISCONTINUED | OUTPATIENT
Start: 2025-04-18 | End: 2025-04-18

## 2025-04-18 RX ORDER — ACETAMINOPHEN 650 MG/1
650 SUPPOSITORY RECTAL EVERY 4 HOURS PRN
Status: DISCONTINUED | OUTPATIENT
Start: 2025-04-18 | End: 2025-04-22

## 2025-04-18 RX ORDER — OXYCODONE HYDROCHLORIDE 5 MG/1
5 TABLET ORAL EVERY 4 HOURS PRN
Status: DISCONTINUED | OUTPATIENT
Start: 2025-04-18 | End: 2025-04-18 | Stop reason: HOSPADM

## 2025-04-18 RX ORDER — ALBUTEROL SULFATE 0.83 MG/ML
2.5 SOLUTION RESPIRATORY (INHALATION) ONCE AS NEEDED
Status: DISCONTINUED | OUTPATIENT
Start: 2025-04-18 | End: 2025-04-18 | Stop reason: HOSPADM

## 2025-04-18 RX ORDER — LABETALOL HYDROCHLORIDE 5 MG/ML
10 INJECTION, SOLUTION INTRAVENOUS ONCE
Status: COMPLETED | OUTPATIENT
Start: 2025-04-18 | End: 2025-04-18

## 2025-04-18 RX ADMIN — FENTANYL CITRATE 50 MCG: 50 INJECTION, SOLUTION INTRAMUSCULAR; INTRAVENOUS at 18:22

## 2025-04-18 RX ADMIN — MORPHINE SULFATE 4 MG: 4 INJECTION, SOLUTION INTRAMUSCULAR; INTRAVENOUS at 16:41

## 2025-04-18 RX ADMIN — Medication 6 L/MIN: at 19:48

## 2025-04-18 RX ADMIN — FENTANYL CITRATE 50 MCG: 50 INJECTION, SOLUTION INTRAMUSCULAR; INTRAVENOUS at 18:41

## 2025-04-18 RX ADMIN — ROCURONIUM BROMIDE 50 MG: 10 INJECTION INTRAVENOUS at 18:18

## 2025-04-18 RX ADMIN — SUGAMMADEX 200 MG: 100 INJECTION, SOLUTION INTRAVENOUS at 19:20

## 2025-04-18 RX ADMIN — FENTANYL CITRATE 50 MCG: 50 INJECTION, SOLUTION INTRAMUSCULAR; INTRAVENOUS at 19:17

## 2025-04-18 RX ADMIN — HYDROMORPHONE HYDROCHLORIDE 0.5 MG: 1 INJECTION, SOLUTION INTRAMUSCULAR; INTRAVENOUS; SUBCUTANEOUS at 20:58

## 2025-04-18 RX ADMIN — ONDANSETRON 4 MG: 2 INJECTION, SOLUTION INTRAMUSCULAR; INTRAVENOUS at 19:15

## 2025-04-18 RX ADMIN — PROPOFOL 150 MG: 10 INJECTION, EMULSION INTRAVENOUS at 18:18

## 2025-04-18 RX ADMIN — ONDANSETRON 4 MG: 2 INJECTION, SOLUTION INTRAMUSCULAR; INTRAVENOUS at 14:44

## 2025-04-18 RX ADMIN — SODIUM CHLORIDE, POTASSIUM CHLORIDE, SODIUM LACTATE AND CALCIUM CHLORIDE: 600; 310; 30; 20 INJECTION, SOLUTION INTRAVENOUS at 19:03

## 2025-04-18 RX ADMIN — Medication 6 L/MIN: at 19:42

## 2025-04-18 RX ADMIN — LIDOCAINE HYDROCHLORIDE 100 MG: 20 INJECTION, SOLUTION EPIDURAL; INFILTRATION; INTRACAUDAL; PERINEURAL at 18:18

## 2025-04-18 RX ADMIN — MORPHINE SULFATE 1 MG: 2 INJECTION, SOLUTION INTRAMUSCULAR; INTRAVENOUS at 23:06

## 2025-04-18 RX ADMIN — ROCURONIUM BROMIDE 20 MG: 10 INJECTION INTRAVENOUS at 19:04

## 2025-04-18 RX ADMIN — PROPOFOL 50 MG: 10 INJECTION, EMULSION INTRAVENOUS at 18:21

## 2025-04-18 RX ADMIN — FENTANYL CITRATE 50 MCG: 50 INJECTION, SOLUTION INTRAMUSCULAR; INTRAVENOUS at 19:22

## 2025-04-18 RX ADMIN — PANTOPRAZOLE SODIUM 40 MG: 40 INJECTION, POWDER, FOR SOLUTION INTRAVENOUS at 14:44

## 2025-04-18 RX ADMIN — MIDAZOLAM HYDROCHLORIDE 2 MG: 1 INJECTION, SOLUTION INTRAMUSCULAR; INTRAVENOUS at 18:10

## 2025-04-18 RX ADMIN — MORPHINE SULFATE 4 MG: 4 INJECTION, SOLUTION INTRAMUSCULAR; INTRAVENOUS at 14:44

## 2025-04-18 RX ADMIN — SODIUM CHLORIDE, POTASSIUM CHLORIDE, SODIUM LACTATE AND CALCIUM CHLORIDE: 600; 310; 30; 20 INJECTION, SOLUTION INTRAVENOUS at 18:10

## 2025-04-18 RX ADMIN — Medication 4 L/MIN: at 20:00

## 2025-04-18 RX ADMIN — METRONIDAZOLE 500 MG: 500 INJECTION, SOLUTION INTRAVENOUS at 17:10

## 2025-04-18 RX ADMIN — ONDANSETRON 4 MG: 2 INJECTION, SOLUTION INTRAMUSCULAR; INTRAVENOUS at 16:41

## 2025-04-18 RX ADMIN — DEXAMETHASONE SODIUM PHOSPHATE 8 MG: 4 INJECTION, SOLUTION INTRAMUSCULAR; INTRAVENOUS at 18:24

## 2025-04-18 RX ADMIN — CIPROFLOXACIN 400 MG: 400 INJECTION, SOLUTION INTRAVENOUS at 18:20

## 2025-04-18 RX ADMIN — LABETALOL HYDROCHLORIDE 10 MG: 5 INJECTION INTRAVENOUS at 19:54

## 2025-04-18 RX ADMIN — HYDROMORPHONE HYDROCHLORIDE 0.5 MG: 1 INJECTION, SOLUTION INTRAMUSCULAR; INTRAVENOUS; SUBCUTANEOUS at 20:03

## 2025-04-18 SDOH — SOCIAL STABILITY: SOCIAL INSECURITY: DO YOU FEEL ANYONE HAS EXPLOITED OR TAKEN ADVANTAGE OF YOU FINANCIALLY OR OF YOUR PERSONAL PROPERTY?: NO

## 2025-04-18 SDOH — SOCIAL STABILITY: SOCIAL INSECURITY: ARE THERE ANY APPARENT SIGNS OF INJURIES/BEHAVIORS THAT COULD BE RELATED TO ABUSE/NEGLECT?: NO

## 2025-04-18 SDOH — SOCIAL STABILITY: SOCIAL INSECURITY: HAVE YOU HAD ANY THOUGHTS OF HARMING ANYONE ELSE?: NO

## 2025-04-18 SDOH — SOCIAL STABILITY: SOCIAL INSECURITY: HAS ANYONE EVER THREATENED TO HURT YOUR FAMILY OR YOUR PETS?: NO

## 2025-04-18 SDOH — SOCIAL STABILITY: SOCIAL INSECURITY: ABUSE: ADULT

## 2025-04-18 SDOH — SOCIAL STABILITY: SOCIAL INSECURITY: DOES ANYONE TRY TO KEEP YOU FROM HAVING/CONTACTING OTHER FRIENDS OR DOING THINGS OUTSIDE YOUR HOME?: NO

## 2025-04-18 SDOH — SOCIAL STABILITY: SOCIAL INSECURITY: DO YOU FEEL UNSAFE GOING BACK TO THE PLACE WHERE YOU ARE LIVING?: NO

## 2025-04-18 SDOH — SOCIAL STABILITY: SOCIAL INSECURITY: WERE YOU ABLE TO COMPLETE ALL THE BEHAVIORAL HEALTH SCREENINGS?: YES

## 2025-04-18 SDOH — SOCIAL STABILITY: SOCIAL INSECURITY: ARE YOU OR HAVE YOU BEEN THREATENED OR ABUSED PHYSICALLY, EMOTIONALLY, OR SEXUALLY BY ANYONE?: NO

## 2025-04-18 SDOH — SOCIAL STABILITY: SOCIAL INSECURITY: HAVE YOU HAD THOUGHTS OF HARMING ANYONE ELSE?: NO

## 2025-04-18 ASSESSMENT — COGNITIVE AND FUNCTIONAL STATUS - GENERAL
DAILY ACTIVITIY SCORE: 24
MOBILITY SCORE: 24
PATIENT BASELINE BEDBOUND: NO

## 2025-04-18 ASSESSMENT — LIFESTYLE VARIABLES
AUDIT-C TOTAL SCORE: 2
HOW OFTEN DO YOU HAVE A DRINK CONTAINING ALCOHOL: MONTHLY OR LESS
AUDIT-C TOTAL SCORE: 2
HOW MANY STANDARD DRINKS CONTAINING ALCOHOL DO YOU HAVE ON A TYPICAL DAY: 3 OR 4
HOW OFTEN DO YOU HAVE 6 OR MORE DRINKS ON ONE OCCASION: NEVER
SKIP TO QUESTIONS 9-10: 0

## 2025-04-18 ASSESSMENT — PAIN - FUNCTIONAL ASSESSMENT
PAIN_FUNCTIONAL_ASSESSMENT: 0-10
PAIN_FUNCTIONAL_ASSESSMENT: UNABLE TO SELF-REPORT
PAIN_FUNCTIONAL_ASSESSMENT: UNABLE TO SELF-REPORT
PAIN_FUNCTIONAL_ASSESSMENT: 0-10
PAIN_FUNCTIONAL_ASSESSMENT: UNABLE TO SELF-REPORT
PAIN_FUNCTIONAL_ASSESSMENT: 0-10
PAIN_FUNCTIONAL_ASSESSMENT: UNABLE TO SELF-REPORT

## 2025-04-18 ASSESSMENT — ENCOUNTER SYMPTOMS
ANAL BLEEDING: 0
ABDOMINAL PAIN: 1
TROUBLE SWALLOWING: 0
WHEEZING: 0
EYE DISCHARGE: 0
CONSTIPATION: 0
MYALGIAS: 0
SINUS PRESSURE: 0
NAUSEA: 1
CHILLS: 0
ABDOMINAL DISTENTION: 0
DIAPHORESIS: 0
POLYDIPSIA: 0
COUGH: 0
SORE THROAT: 0
BRUISES/BLEEDS EASILY: 0
RECTAL PAIN: 0
WOUND: 0
EYE ITCHING: 0
DIZZINESS: 1
EYE PAIN: 0
ADENOPATHY: 0
FATIGUE: 0
ARTHRALGIAS: 0
POLYPHAGIA: 0
FREQUENCY: 0
BLOOD IN STOOL: 0
JOINT SWELLING: 0
VOICE CHANGE: 0
SHORTNESS OF BREATH: 0
HEMATURIA: 0
APPETITE CHANGE: 0
FACIAL ASYMMETRY: 0
TREMORS: 0
EYE REDNESS: 0
CHOKING: 0
DIARRHEA: 0
FACIAL SWELLING: 0
SLEEP DISTURBANCE: 0
SPEECH DIFFICULTY: 0
FLANK PAIN: 0
NECK STIFFNESS: 0
RHINORRHEA: 0
NECK PAIN: 0
PALPITATIONS: 0
CHEST TIGHTNESS: 0
SINUS PAIN: 0
STRIDOR: 0
DYSURIA: 0
LIGHT-HEADEDNESS: 0
DIFFICULTY URINATING: 0
PHOTOPHOBIA: 0
BACK PAIN: 0
COLOR CHANGE: 0
NUMBNESS: 0
SEIZURES: 0
ACTIVITY CHANGE: 0
WEAKNESS: 0
VOMITING: 0
HEADACHES: 0

## 2025-04-18 ASSESSMENT — PAIN DESCRIPTION - LOCATION
LOCATION: ABDOMEN
LOCATION: CHEST
LOCATION: ABDOMEN

## 2025-04-18 ASSESSMENT — PAIN SCALES - GENERAL
PAINLEVEL_OUTOF10: 7
PAINLEVEL_OUTOF10: 8
PAINLEVEL_OUTOF10: 7
PAINLEVEL_OUTOF10: 10 - WORST POSSIBLE PAIN
PAINLEVEL_OUTOF10: 10 - WORST POSSIBLE PAIN
PAINLEVEL_OUTOF10: 8
PAINLEVEL_OUTOF10: 8

## 2025-04-18 ASSESSMENT — PAIN DESCRIPTION - PAIN TYPE
TYPE: ACUTE PAIN
TYPE: ACUTE PAIN

## 2025-04-18 ASSESSMENT — COLUMBIA-SUICIDE SEVERITY RATING SCALE - C-SSRS
2. HAVE YOU ACTUALLY HAD ANY THOUGHTS OF KILLING YOURSELF?: NO
1. IN THE PAST MONTH, HAVE YOU WISHED YOU WERE DEAD OR WISHED YOU COULD GO TO SLEEP AND NOT WAKE UP?: NO
6. HAVE YOU EVER DONE ANYTHING, STARTED TO DO ANYTHING, OR PREPARED TO DO ANYTHING TO END YOUR LIFE?: NO
2. HAVE YOU ACTUALLY HAD ANY THOUGHTS OF KILLING YOURSELF?: NO
6. HAVE YOU EVER DONE ANYTHING, STARTED TO DO ANYTHING, OR PREPARED TO DO ANYTHING TO END YOUR LIFE?: NO
1. IN THE PAST MONTH, HAVE YOU WISHED YOU WERE DEAD OR WISHED YOU COULD GO TO SLEEP AND NOT WAKE UP?: NO

## 2025-04-18 ASSESSMENT — ACTIVITIES OF DAILY LIVING (ADL)
HEARING - RIGHT EAR: FUNCTIONAL
DRESSING YOURSELF: INDEPENDENT
FEEDING YOURSELF: INDEPENDENT
BATHING: INDEPENDENT
JUDGMENT_ADEQUATE_SAFELY_COMPLETE_DAILY_ACTIVITIES: YES
TOILETING: INDEPENDENT
ADEQUATE_TO_COMPLETE_ADL: YES
WALKS IN HOME: INDEPENDENT
HEARING - LEFT EAR: FUNCTIONAL
PATIENT'S MEMORY ADEQUATE TO SAFELY COMPLETE DAILY ACTIVITIES?: YES
GROOMING: INDEPENDENT

## 2025-04-18 ASSESSMENT — PATIENT HEALTH QUESTIONNAIRE - PHQ9
SUM OF ALL RESPONSES TO PHQ9 QUESTIONS 1 & 2: 0
2. FEELING DOWN, DEPRESSED OR HOPELESS: NOT AT ALL
1. LITTLE INTEREST OR PLEASURE IN DOING THINGS: NOT AT ALL

## 2025-04-18 ASSESSMENT — PAIN DESCRIPTION - DESCRIPTORS
DESCRIPTORS: ACHING
DESCRIPTORS: SORE

## 2025-04-18 ASSESSMENT — PAIN DESCRIPTION - ORIENTATION
ORIENTATION: RIGHT
ORIENTATION: RIGHT

## 2025-04-18 NOTE — ANESTHESIA PROCEDURE NOTES
Airway  Date/Time: 4/18/2025 6:20 PM  Reason: elective    Airway not difficult    Staffing  Performed: LILIYA   Authorized by: Pieter Bull MD    Performed by: LILIYA Urbina  Patient location during procedure: OR    Patient Condition  Indications for airway management: anesthesia  Patient position: sniffing  No planned trial extubation  Sedation level: deep     Final Airway Details   Preoxygenated: yes  Final airway type: endotracheal airway  Successful airway: ETT  Cuffed: yes   Successful intubation technique: direct laryngoscopy  Adjuncts used in placement: intubating stylet  Endotracheal tube insertion site: oral  Blade: Robbie  Blade size: #3  ETT size (mm): 7.5  Cormack-Lehane Classification: grade I - full view of glottis  Placement verified by: chest auscultation and capnometry   Cuff volume (mL): 7  Measured from: lips  ETT to lips (cm): 22  Number of attempts at approach: 1

## 2025-04-18 NOTE — HOSPITAL COURSE
Severe RUQ pain with nausea. She underwent a CT of her abdomen Severe acute cholecystitis with gallbladder dilatation, wall thickening, pericholecystic fluid and gallstones. Surgery is taking to OR.     She did undergo an EGD 4/16/25 which did show an ulcer. She was recommended to take protonix BID x 1 month, avoid NSAIDS    PMHX: obesity s/p Nadja en Y gastric bypass , HTN, HLD     Acute cholecystitis  -flagyl and cipro (pcn allergy)  -NPO  -CT Severe acute cholecystitis with gallbladder dilatation, wall thickening, pericholecystic fluid and gallstones  -PRN pain meds  -AVOID NSAIDS due to ulcer  -surgery consulted, plan for OR  -mildly tachycardic, likely 2/2 to cholecystitis-> tele   -WBC 25.6-> trend    Ulcer  -per recent EGD  -continue BID protonix    HTN/HLD  -resume statin  -stopped taking losartan? Given high BP resume,  -trend BP      DVT prophylaxis:  Lovenox held due to OR, SCD    DC plan:  DC home when medically stable    Labs/Testing reviewed    Interdisciplinary team rounding completed with hospitalist, nurse, TCC    NP discussed plan and lab/testing results with Dr. Jeffers

## 2025-04-18 NOTE — CONSULTS
"Reason For Consult  Abdominal pain    History Of Present Illness  Yuni Jordan is a 54 y.o. female with multiple medical comorbid conditions to include morbid obesity and multiple previous abdominal operations to include Nadja-en-Y gastric bypass.  Recently admitted to the hospital where she underwent upper endoscopy where she was found to have a gastric ulcer.  She comes back now with severe right upper quadrant abdominal pain associated with some nausea.  Workup includes a CT scan that shows severe acute cholecystitis, cholelithiasis.     Past Medical History  She has a past medical history of Asthma, Diabetes mellitus (Multi), Eczema (1971), GERD (gastroesophageal reflux disease) (2012), and ARTUR (obstructive sleep apnea).    Surgical History  She has a past surgical history that includes Hysterectomy (02/12/2004); Hernia repair; Colonoscopy (04/2021); Ovarian cyst removal; Myomectomy; Bariatric Surgery; and Esophagogastroduodenoscopy.     Social History  She reports that she has been smoking cigars. She has never used smokeless tobacco. She reports that she does not currently use alcohol. She reports that she does not use drugs.    Family History  Family History[1]     Allergies  Aspirin, Iodides, Iodinated contrast media, Iodine, Nut - unspecified, Penicillins, and Shellfish containing products  Physical Exam    Morbid obese,  In no distress  Alert and oriented x 3  Lungs are clear to auscultation bilaterally.  Cardiac exam is regular rhythm and rate.  Abdomen is soft and marked tenderness right upper quadrant, nondistended.  Well-healed laparoscopic and Pfannenstiel incisions  Neurologic exam is without focal deficit.      Last Recorded Vitals  Blood pressure 177/82, pulse (!) 111, temperature 36.4 °C (97.6 °F), resp. rate 17, height 1.575 m (5' 2\"), weight 89.8 kg (198 lb), SpO2 99%.    Relevant Results  Lab Results   Component Value Date    WBC 25.6 (H) 04/18/2025    HGB 13.6 04/18/2025    HCT 40.8 " 04/18/2025    MCV 92 04/18/2025     04/18/2025     Lab Results   Component Value Date    GLUCOSE 167 (H) 04/18/2025     04/18/2025    K 3.9 04/18/2025     04/18/2025    CO2 21 04/18/2025    ANIONGAP 17 04/18/2025    BUN 13 04/18/2025    CREATININE 0.70 04/18/2025    EGFR >90 04/18/2025    CALCIUM 9.4 04/18/2025    ALBUMIN 4.4 04/18/2025    ALKPHOS 114 (H) 04/18/2025    PROT 7.4 04/18/2025    AST 16 04/18/2025    BILITOT 0.7 04/18/2025    ALT 14 04/18/2025       === 04/18/25 ===    CT ABDOMEN PELVIS WO IV CONTRAST    - Impression -  Severe acute cholecystitis with gallbladder dilatation, wall  thickening, pericholecystic fluid and gallstones. Hyperdense stone  seen in the cystic duct. Surgical consultation recommended.            Assessment/Plan     Impression: Acute cholecystitis, cholelithiasis.  Recommend laparoscopic cholecystectomy.  We discussed the procedure to include risk benefits and alternatives.  She agrees to the operation         Maycol Walsh MD         [1]   Family History  Problem Relation Name Age of Onset    Hypertension Mother Erin Jordan     Cancer Mother Erin Jordan     Diabetes Mother Erin Jordan     Asthma Mother Erin Jordan     Heart failure Mother Erin Jordan     Stroke Mother Erin Jordan     Hypertension Father Juan M Jordan     Heart failure Father Juan M Jordan     Asthma Father Juan M Jordan     Heart disease Father Juan M Jordan     Breast cancer Mother's Sister  77

## 2025-04-18 NOTE — PROGRESS NOTES
"Patient: Yuni Jordan  : 1970  PCP: Jorje Kapadia MD  MRN: [id number]  Program: Transitional Care Management  Status: Enrolled  Effective Dates: 2025 - present  Responsible Staff: Ree Chahal RN  Social Drivers to be Addressed: No information to display         Yuni Jordan is a 54 y.o. female presenting today for follow-up after being discharged from the hospital 2 days ago. The main problem requiring admission was abdominal pain. The discharge summary and/or Transitional Care Management documentation was reviewed. Medication reconciliation was performed as indicated via the \"Arthur as Reviewed\" timestamp.     Yuni Jordan was contacted by Transitional Care Management services two days after her discharge. This encounter and supporting documentation was reviewed.  Patient presents for posthospitalization visit.  She was hospitalized [facility] with abdominal pain.  She went underwent a CT scan which revealed gastro jejunostomy ulcer..  She underwent an EGD which revealed large ulcer with a clean base.  She was started on pantoprazole for her symptoms.  She ha developed worsening abdominal pain, nausea and diarrheas since her discharge.  She has not been able to take much p.o.  She states the pain is a 9 out of 10.  She denies any headaches, but does complain of occasional dizziness.  Denies any chest pain or shortness of breath.  She reports no new musculoskeletal complaints.  Review of Systems   Constitutional:  Negative for activity change, appetite change, chills, diaphoresis and fatigue.   HENT:  Negative for congestion, dental problem, drooling, ear discharge, ear pain, facial swelling, hearing loss, mouth sores, nosebleeds, postnasal drip, rhinorrhea, sinus pressure, sinus pain, sneezing, sore throat, tinnitus, trouble swallowing and voice change.    Eyes:  Negative for photophobia, pain, discharge, redness, itching and visual disturbance.   Respiratory:  Negative for " cough, choking, chest tightness, shortness of breath, wheezing and stridor.    Cardiovascular:  Negative for chest pain, palpitations and leg swelling.   Gastrointestinal:  Positive for abdominal pain and nausea. Negative for abdominal distention, anal bleeding, blood in stool, constipation, diarrhea, rectal pain and vomiting.   Endocrine: Negative for cold intolerance, heat intolerance, polydipsia, polyphagia and polyuria.   Genitourinary:  Negative for decreased urine volume, difficulty urinating, dysuria, enuresis, flank pain, frequency, genital sores, hematuria and urgency.   Musculoskeletal:  Negative for arthralgias, back pain, gait problem, joint swelling, myalgias, neck pain and neck stiffness.   Skin:  Negative for color change, pallor, rash and wound.   Neurological:  Positive for dizziness. Negative for tremors, seizures, syncope, facial asymmetry, speech difficulty, weakness, light-headedness, numbness and headaches.   Hematological:  Negative for adenopathy. Does not bruise/bleed easily.   Psychiatric/Behavioral:  Negative for sleep disturbance.        There were no vitals taken for this visit.    Physical Exam  Constitutional:       General: She is in acute distress.      Appearance: She is ill-appearing.   Neurological:      Mental Status: She is alert.   Psychiatric:         Mood and Affect: Mood normal.         Behavior: Behavior normal.         Thought Content: Thought content normal.       The complexity of medical decision making for this patient's transitional care is high.    Assessment/Plan   Diagnoses and all orders for this visit:  Ulcer, anastomotic-will refer back to the emergency room for severe abdominal pain.  She will follow-up after visit.  Mixed hyperlipidemia-she will take atorvastatin as prescribed  Hypertension, uncontrolled-she will take losartan as prescribed.

## 2025-04-18 NOTE — ED PROVIDER NOTES
Limitations to History: None  Additional History Obtained from: None    HPI:    Patient presented to the emergency department due to chest discomfort and abdominal discomfort.  Patient was recently hospitalized where she had an EGD performed.  She states that she presented due to similar symptoms.  Her EGD was remarkable for an ulcer.  She was started on pantoprazole 40 mg twice daily and referred to GI for repeat EGD in 2 months.  Patient denies any fevers or chills.  She states that she feels like she was discharged home too early.  She is been having continued abdominal discomfort.  She states her abdominal discomfort is epigastric and radiating up to the middle of her chest.  She has been unable to tolerate oral intake since.  She states that she has been taking her pantoprazole and states that it feels like is making her feel bloated.  She denies any current chest discomfort.  Denies any blood in her vomit.  Review of systems is otherwise negative.    ------------------------------------------------------------------------------------------------------------------------------------------  Physical Exam:    ED Triage Vitals   Temperature Heart Rate Respirations BP   04/18/25 1310 04/18/25 1310 04/18/25 1310 04/18/25 1312   36.4 °C (97.6 °F) (!) 114 20 171/70      Pulse Ox Temp src Heart Rate Source Patient Position   04/18/25 1310 -- 04/18/25 1546 04/18/25 1546   96 %  Monitor Lying      BP Location FiO2 (%)     04/18/25 1546 --     Right arm         VS: As documented in the triage note and EMR flowsheet from this visit were reviewed.  General: Well appearing. No acute distress.   Eyes: Pupils round and reactive. No scleral icterus. No conjunctival injection  HENT: Atraumatic. Normocephalic. Moist mucous membranes. Trachea midline  CV: RRR, No MRG. No pedal edema appreciated.  Resp: Clear to auscultation bilaterally. Non-labored.    GI: Soft, epigastric ttp moderate without guarding, rebound or rigidity.  Nondistended.   Skin: Warm, dry, intact. No systemic rashes or lesions appreciated.  Extremities: No deformities or pain out of proportion; pulses intact   Neuro: Alert. No focal motor or sensory deficits observed. Speech fluent. Answers questions appropriately.   Psych: Appropriate. Kempt.    ------------------------------------------------------------------------------------------------------------------------------------------    Medical Decision Making  Patient's presenting to the emergency department due to abdominal pain and vomiting in the setting of known GJ ulcer found on EGD during most recent hospitalization.  On exam patient is awake and alert, well-appearing.  Moist mucous membranes and was warm and well-perfused making severe dehydration less likely.  She was seen evaluated by the provider in triage and workup was started that has been remarkable for leukocytosis.  Lipase, troponin and remainder of workup within normal limits.  Due to the patient's worsening leukocytosis in the setting of recent procedure we will obtain imaging to evaluate for any intra-abdominal process.  Will provide additional pain control while waiting.  Patient is otherwise well-appearing, lower suspicion for cardiac process due to the patient's stable exam at this time.  EKG was reviewed and interpreted independently by me showing sinus tachycardia rate of 103 bpm, normal intervals no significant ST segment changes when compared to previous EKG on August 5, 2018 the rate has increased but no other ST segment changes noted    External Records Reviewed: I reviewed recent and relevant outside records including: prior discharge summary  Escalation of Care: Appropriate for Observation  Social Determinants Affecting Care:Not applicable  Prescription Drug Consideration: per orders  Diagnostic testing considered: per orders  Discussion of Management with Other Providers: I discussed the patient/results with: surgery and  medicine    Objective Data  I have independently interpreted the following labs, imaging studies and MDM added to ED Course  Labs Reviewed   CBC WITH AUTO DIFFERENTIAL - Abnormal       Result Value    WBC 25.6 (*)     nRBC 0.0      RBC 4.45      Hemoglobin 13.6      Hematocrit 40.8      MCV 92      MCH 30.6      MCHC 33.3      RDW 13.2      Platelets 269      Neutrophils % 88.9      Immature Granulocytes %, Automated 0.6      Lymphocytes % 3.6      Monocytes % 6.7      Eosinophils % 0.0      Basophils % 0.2      Neutrophils Absolute 22.77 (*)     Immature Granulocytes Absolute, Automated 0.16      Lymphocytes Absolute 0.92 (*)     Monocytes Absolute 1.72 (*)     Eosinophils Absolute 0.00      Basophils Absolute 0.04     COMPREHENSIVE METABOLIC PANEL - Abnormal    Glucose 167 (*)     Sodium 138      Potassium 3.9      Chloride 104      Bicarbonate 21      Anion Gap 17      Urea Nitrogen 13      Creatinine 0.70      eGFR >90      Calcium 9.4      Albumin 4.4      Alkaline Phosphatase 114 (*)     Total Protein 7.4      AST 16      Bilirubin, Total 0.7      ALT 14     LIPASE - Normal    Lipase 15      Narrative:     Venipuncture immediately after or during the administration of Metamizole may lead to falsely low results. Testing should be performed immediately prior to Metamizole dosing.   SERIAL TROPONIN-INITIAL - Normal    Troponin I, High Sensitivity 7      Narrative:     Less than 99th percentile of normal range cutoff-  Female and children under 18 years old <14 ng/L; Male <21 ng/L: Negative  Repeat testing should be performed if clinically indicated.     Female and children under 18 years old 14-50 ng/L; Male 21-50 ng/L:  Consistent with possible cardiac damage and possible increased clinical   risk. Serial measurements may help to assess extent of myocardial damage.     >50 ng/L: Consistent with cardiac damage, increased clinical risk and  myocardial infarction. Serial measurements may help assess extent of    myocardial damage.      NOTE: Children less than 1 year old may have higher baseline troponin   levels and results should be interpreted in conjunction with the overall   clinical context.     NOTE: Troponin I testing is performed using a different   testing methodology at [facility] than at other   [facility] hospitals. Direct result comparisons should only   be made within the same method.   TROPONIN SERIES- (INITIAL, 1 HR)    Narrative:     The following orders were created for panel order Troponin Series, (0, 1 HR).  Procedure                               Abnormality         Status                     ---------                               -----------         ------                     Troponin I, High Sensiti...[[id number]]  Normal              Final result               Troponin, High Sensitivi...[[id number]]                                                   Please view results for these tests on the individual orders.       CT abdomen pelvis wo IV contrast   Final Result   Severe acute cholecystitis with gallbladder dilatation, wall   thickening, pericholecystic fluid and gallstones. Hyperdense stone   seen in the cystic duct. Surgical consultation recommended.                       MACRO:   None.        Signed by: Dane Johns 4/18/2025 4:44 PM   Dictation workstation:   [id number]      FL GI cholangiography intraop    (Results Pending)       ED Course  ED Course as of 04/18/25 1735 Fri Apr 18, 2025 1657 Call placed to surgical [facility].  They will evaluate the patient in consult.  Patient updated with imaging findings and need for admission and surgical evaluation.  Will discuss the patient with medicine for further management and admission. [LP]   1720 Surgery evaluated the patient, they will take the patient to the OR this evening, awaiting medicine callback for admission [LP]   1734 Spoke with the observation team who agreed to admit [LP]      ED Course User Index  [LP] Susan Bell DO          Diagnoses as of 04/18/25 1735   Acute cholecystitis       Procedure  Procedures    Disposition: obs    Susan Bell DO  Emergency Medicine  Medical Toxicology     Susan Bell DO  04/18/25 1730

## 2025-04-18 NOTE — ED TRIAGE NOTES
Pt presents with the c/o epigastric and chest pains, with associated shortness of breath. Pt sates that she was admitted on the 16th and had an EGD then sent home.

## 2025-04-18 NOTE — ED TRIAGE NOTES
As provider-in-triage, I performed a medical screening history and physical exam on this patient.  HISTORY OF PRESENT ILLNESS  (include at least one item)    Had EGD that showed ulcer and hiatal hernia requesting morphine and nausea meds because this pain has caused her to vomit    PHYSICAL EXAM  Vital Signs reviewed.  (include at least one additional item)     Sitting in wheelchair uncomfortable leaning forward    MDM  (describe briefly what was initiated or planned)    Basic labs morphine Zofran Protonix    I evaluated this patient in triage with the RN. Due to the patients complaint labs and or imaging were ordered by myself in an attempt to expedite patient care however I am not participating in care after evaluation. This is a preliminary assessment. Pt does not appear in acute distress at this time. They will have a full evaluation as soon as possible. They will be cared for by another provider who will possibly order more labs, imaging or interventions. Pt did not have a full ROS or PE completed by myself however below is a summary with reasons for orders.  For the remainder of the patient's workup and ED course, please refer to the main ED provider note. We discussed need for diagnostic testing including laboratory studies and imaging.  We also discussed that patient may be asked to wait in the waiting room while these tests are pending.  They understand that if they choose to leave without having the testing completed or resulted that we cannot rule out acute life threatening illnesses and the risks involved could lead to worsening condition, permanent disability or even death.

## 2025-04-18 NOTE — ANESTHESIA PREPROCEDURE EVALUATION
Patient: Yuni Jordan    Procedure Information       Date/Time: 04/18/25 1800    Procedure: CHOLECYSTECTOMY, LAPAROSCOPIC    Location: [facility] OR 05 / [facility] OR    Surgeons: Maycol Walsh MD            Relevant Problems   Cardiac   (+) Hyperlipemia   (+) Hypertension, uncontrolled      Neuro   (+) Anxiety   (+) Carpal tunnel syndrome   (+) Depression with anxiety      GI   (+) Acid reflux   (+) Diffuse varioliform gastritis      Liver   (+) Acute cholecystitis   (+) Cholecystitis      Endocrine   (+) Diabetes mellitus type 2, diet-controlled      Hematology   (+) Anemia, iron deficiency      Musculoskeletal   (+) Carpal tunnel syndrome       Clinical information reviewed:   Tobacco  Allergies    Med Hx  Surg Hx   Fam Hx  Soc Hx         Medical History[1]   Surgical History[2]  Social History[3]   Current Outpatient Medications   Medication Instructions    albuterol (ProAir HFA) 90 mcg/actuation inhaler 1-2 puffs    atorvastatin (Lipitor) 20 mg tablet TAKE 1 TABLET BY MOUTH EVERY DAY    cyclobenzaprine (FLEXERIL) 10 mg, oral, Nightly PRN    losartan (COZAAR) 50 mg, oral, Daily    multivitamin tablet 1 tablet, oral, Daily    multivitamin with minerals tablet 1 tablet, oral, Daily    pantoprazole (ProtoNix) 40 mg EC tablet Take 1 tablet (40 mg) by mouth 2 times a day before meals for 30 days, THEN 1 tablet (40 mg) once daily in the morning. Take before meals. Do not crush, chew, or split.      RX Allergies[4]     Chemistry    Lab Results   Component Value Date/Time     04/18/2025 1439    K 3.9 04/18/2025 1439     04/18/2025 1439    CO2 21 04/18/2025 1439    BUN 13 04/18/2025 1439    CREATININE 0.70 04/18/2025 1439    Lab Results   Component Value Date/Time    CALCIUM 9.4 04/18/2025 1439    ALKPHOS 114 (H) 04/18/2025 1439    AST 16 04/18/2025 1439    ALT 14 04/18/2025 1439    BILITOT 0.7 04/18/2025 1439          Lab Results   Component Value Date    HGBA1C 7.0 (A) 04/15/2025     Lab Results  "  Component Value Date/Time    WBC 25.6 (H) 04/18/2025 1439    HGB 13.6 04/18/2025 1439    HCT 40.8 04/18/2025 1439     04/18/2025 1439     Lab Results   Component Value Date/Time    PROTIME 13.4 (H) 11/30/2018 2041    INR 1.2 (H) 11/30/2018 2041     No results found for: \"ABORH\"  No results found for this or any previous visit (from the past 4464 hours).  No results found for this or any previous visit from the past 1095 days.       Visit Vitals  /82   Pulse (!) 111   Temp 36.4 °C (97.6 °F)   Resp 17   Ht 1.575 m (5' 2\")   Wt 89.8 kg (198 lb)   SpO2 99%   BMI 36.21 kg/m²   OB Status Postmenopausal   Smoking Status Some Days   BSA 1.98 m²     No data recorded    Physical Exam    Airway  Mallampati: III  TM distance: >3 FB  Neck ROM: full  Mouth opening: 3 or more finger widths     Cardiovascular - normal exam  Rhythm: regular  Rate: normal     Dental    Pulmonary - normal exam   Abdominal - normal exam           Anesthesia Plan    History of general anesthesia?: yes  History of complications of general anesthesia?: no    ASA 3 - emergent     general   (GETA with standard ASA monitoring)  intravenous induction   Postoperative pain plan includes opioids.  Anesthetic plan and risks discussed with patient.    Plan discussed with CAA and CRNA.             [1]   Past Medical History:  Diagnosis Date    Asthma     Diabetes mellitus (Multi)     patient states she used to be but she guesses so    Eczema 1971    GERD (gastroesophageal reflux disease) 2012    ARTUR (obstructive sleep apnea)     patient denies    Peptic ulceration 2015   [2]   Past Surgical History:  Procedure Laterality Date    BARIATRIC SURGERY      Gastric sleeve converted to Nadja-en-Y gastric bypass    COLONOSCOPY  04/2021    rpt 4-31    ESOPHAGOGASTRODUODENOSCOPY      HERNIA REPAIR      HYSTERECTOMY  02/12/2004    MYOMECTOMY      Laparoscopic    OVARIAN CYST REMOVAL     [3]   Social History  Tobacco Use    Smoking status: Some Days     Types: " Cigars    Smokeless tobacco: Never   Substance Use Topics    Alcohol use: Not Currently     Comment: socially    Drug use: Never   [4]   Allergies  Allergen Reactions    Aspirin Other    Iodides Unknown    Iodinated Contrast Media Other    Iodine Unknown    Nut - Unspecified Itching    Penicillins Other    Shellfish Containing Products Unknown     HA/ passes out

## 2025-04-18 NOTE — OP NOTE
CHOLECYSTECTOMY, LAPAROSCOPIC Operative Note     Date: 2025  OR Location: [facility] OR    Name: Yuni Jordan, : 1970, Age: 54 y.o., MRN: [id number], Sex: female    Diagnosis  Pre-op Diagnosis      * Acute cholecystitis, Cholelithiasis Post-op Diagnosis     * Acute Gangrenous cholecystitis , cholelithiasis      Procedures  CHOLECYSTECTOMY, LAPAROSCOPIC  66277 - GA LAPAROSCOPY SURG CHOLECYSTECTOMY      Surgeons      * Maycol Walsh - Primary    Resident/Fellow/Other Assistant:  Surgeons and Role:  * No surgeons found with a matching role *    Staff:   Edulator: Anni  Circulator: Susan  Scrub Person: Ada  Scrub Person: Chandni Salmon Scrub: Jayesh  Scrub Person: Denia    Anesthesia Staff: Anesthesiologist: Pieter Bull MD  C-AA: LILIYA Urbina    Procedure Summary  Anesthesia: General  ASA: III  Estimated Blood Loss: 15 mL  Intra-op Medications:   Administrations occurring from 1800 to 1950 on 25:   Medication Name Total Dose   sodium chloride 0.9 % irrigation solution 1,000 mL   BUPivacaine HCl (Marcaine) 0.5 % (5 mg/mL) 20 mL, lidocaine (Xylocaine) 20 mL syringe Cannot be calculated   ciprofloxacin (Cipro) 400 mg in dextrose 5%  mL Cannot be calculated   ciprofloxacin (Cipro)  mg in 200 mL D5W - premix 400 mg   dexAMETHasone (Decadron) injection 4 mg/mL 8 mg   fentaNYL (Sublimaze) injection 50 mcg/mL 200 mcg   LR infusion Cannot be calculated   lidocaine PF (Xylocaine-MPF) local injection 2 % 100 mg   metroNIDAZOLE (Flagyl) 500 mg in sodium chloride (iso)  mL Cannot be calculated   midazolam PF (Versed) injection 1 mg/mL 2 mg   morphine injection 1 mg Cannot be calculated   ondansetron (Zofran) 2 mg/mL injection 4 mg   oxyCODONE (Roxicodone) immediate release tablet 5 mg Cannot be calculated   pantoprazole (Protonix) injection 40 mg Cannot be calculated   propofol (Diprivan) injection 10 mg/mL 200 mg   rocuronium (ZeMuron) 50 mg/5 mL injection 70 mg   sugammadex  (Bridion) 200 mg/2 mL injection 200 mg              Anesthesia Record               Intraprocedure I/O Totals          Intake    LR infusion 1000.00 mL    ciprofloxacin 400 mg/200 mL 200.00 mL    Total Intake 1200 mL       Output    Est. Blood Loss 10 mL    Total Output 10 mL       Net    Net Volume 1190 mL          Specimen:   ID Type Source Tests Collected by Time   1 : gallbladder Tissue GALLBLADDER CHOLECYSTECTOMY SURGICAL PATHOLOGY EXAM Maycol Walsh MD 4/18/2025 9225   A : bile Fluid BILE FLUID AFB CULTURE/SMEAR, FUNGAL CULTURE/SMEAR, STERILE FLUID CULTURE/SMEAR Maycol Walsh MD 4/18/2025 3906           Findings: Gangrenous  Acute cholecystitis ,     Indications: Yuni Jordan is an 54 y.o. female who is having surgery for Acute cholecystitis [K81.0].     The patient was seen in the preoperative area. The risks, benefits, complications, treatment options, non-operative alternatives, expected recovery and outcomes were discussed with the patient. The possibilities of reaction to medication, pulmonary aspiration, injury to surrounding structures, bleeding, recurrent infection, the need for additional procedures, failure to diagnose a condition, and creating a complication requiring transfusion or operation were discussed with the patient. The patient concurred with the proposed plan, giving informed consent.  The site of surgery was properly noted/marked if necessary per policy. The patient has been actively warmed in preoperative area. Preoperative antibiotics have been ordered and given within 1 hours of incision. Venous thrombosis prophylaxis have been ordered including bilateral sequential compression devices     Procedure Details: Yuni Jordan is a 54 y.o. female with multiple medical comorbid conditions to include morbid obesity and multiple previous abdominal operations to include Nadja-en-Y gastric bypass.  Recently admitted to the hospital where she underwent upper endoscopy where she was found  "to have a gastric ulcer.  She comes back now with severe right upper quadrant abdominal pain associated with some nausea.  Workup includes a CT scan that shows severe acute cholecystitis, cholelithiasis.   She is taken to surgery for laparoscopic cholecystectomy.  Risk benefits and alternatives were discussed preoperatively she agreed to the operation.       DESCRIPTION OF PROCEDURE:    Patient in the operating room, placed supine on the operating table.  Timeout was established, general anesthesia was induced.  She did receive antibiotic.  The abdomen was prepped and draped in a sterile fashion.  We made a infraumbilical incision and placed a 12 mm Fontanez trocar.  We establish insufflation.  A 5 mm subxiphoid port was placed as well as two 5 mm right subcostal ports.  She was placed in reverse Trendelenburg position.      The gallbladder was markedly inflamed, And showed signs of gangrene. We found it necessary to aspirate bile to facilitate traction on the gallbladder. Some of this was sent for culture. We retracted the gallbladder up and over the right lobe of the liver.  There were attachments of the omentum to the gallbladder were taken down.  We retracted the gallbladder up and over the right liver lobe of the liver.  The infundibulum was retracted laterally and inferiorly and this nicely exposed the triangle of Calot.      We dissected the base of the gallbladder off of the cystic plate of the liver.  We then dissected the cystic duct and cystic artery free from surrounding structures.  Only two structures were noted to enter the gallbladder; cystic duct and cystic artery thus achieving the \"critical view of safety\".   We doubly ligated the cystic artery using Hem-o-frankie clips and divided it.  In a similar manner, we doubly ligated the cystic duct and divided it.       We then excised the gallbladder from the inferior aspect of the liver using the cautery.  Once excised, we removed the gallbladder specimen " using an Endo Catch bag.  We re-established insufflation. There was some oozing in the gallbladder fossa and this was controlled with cautery and some Gildardo. After ensuring hemostasis, we removed our ports, closed our midline fascial defect using 0 Vicryl suture placed in a figure of eight fashion.  We then closed the skin using 3-0 and 4-0 subcuticular Vicryl stitches followed by Dermabond glue.  The patient tolerated the procedure without difficulty and was returned to the recovery room in stable condition.      Complications:  None; patient tolerated the procedure well.    Disposition: PACU - hemodynamically stable.  Condition: stable           Attending Attestation: I performed the procedure.    Maycol Walsh  Phone Number: [phone]

## 2025-04-19 LAB
ACID FAST STN SPEC: NORMAL
ANION GAP SERPL CALC-SCNC: 15 MMOL/L (ref 10–20)
BASOPHILS # BLD AUTO: 0.04 X10*3/UL (ref 0–0.1)
BASOPHILS NFR BLD AUTO: 0.1 %
BUN SERPL-MCNC: 16 MG/DL (ref 6–23)
CALCIUM SERPL-MCNC: 9 MG/DL (ref 8.6–10.3)
CHLORIDE SERPL-SCNC: 104 MMOL/L (ref 98–107)
CO2 SERPL-SCNC: 22 MMOL/L (ref 21–32)
CREAT SERPL-MCNC: 0.74 MG/DL (ref 0.5–1.05)
EGFRCR SERPLBLD CKD-EPI 2021: >90 ML/MIN/1.73M*2
EOSINOPHIL # BLD AUTO: 0 X10*3/UL (ref 0–0.7)
EOSINOPHIL NFR BLD AUTO: 0 %
ERYTHROCYTE [DISTWIDTH] IN BLOOD BY AUTOMATED COUNT: 13.5 % (ref 11.5–14.5)
FUNGUS SPEC FUNGUS STN: NORMAL
GLUCOSE SERPL-MCNC: 145 MG/DL (ref 74–99)
HCT VFR BLD AUTO: 36.8 % (ref 36–46)
HGB BLD-MCNC: 12 G/DL (ref 12–16)
IMM GRANULOCYTES # BLD AUTO: 0.22 X10*3/UL (ref 0–0.7)
IMM GRANULOCYTES NFR BLD AUTO: 0.8 % (ref 0–0.9)
LYMPHOCYTES # BLD AUTO: 1.77 X10*3/UL (ref 1.2–4.8)
LYMPHOCYTES NFR BLD AUTO: 6.5 %
MCH RBC QN AUTO: 30.5 PG (ref 26–34)
MCHC RBC AUTO-ENTMCNC: 32.6 G/DL (ref 32–36)
MCV RBC AUTO: 93 FL (ref 80–100)
MONOCYTES # BLD AUTO: 1.66 X10*3/UL (ref 0.1–1)
MONOCYTES NFR BLD AUTO: 6.1 %
MYCOBACTERIUM SPEC CULT: NORMAL
NEUTROPHILS # BLD AUTO: 23.6 X10*3/UL (ref 1.2–7.7)
NEUTROPHILS NFR BLD AUTO: 86.5 %
NRBC BLD-RTO: 0 /100 WBCS (ref 0–0)
PLATELET # BLD AUTO: 213 X10*3/UL (ref 150–450)
POTASSIUM SERPL-SCNC: 3.8 MMOL/L (ref 3.5–5.3)
RBC # BLD AUTO: 3.94 X10*6/UL (ref 4–5.2)
SODIUM SERPL-SCNC: 137 MMOL/L (ref 136–145)
WBC # BLD AUTO: 27.3 X10*3/UL (ref 4.4–11.3)

## 2025-04-19 PROCEDURE — 96372 THER/PROPH/DIAG INJ SC/IM: CPT

## 2025-04-19 PROCEDURE — 82374 ASSAY BLOOD CARBON DIOXIDE: CPT | Performed by: INTERNAL MEDICINE

## 2025-04-19 PROCEDURE — 2500000001 HC RX 250 WO HCPCS SELF ADMINISTERED DRUGS (ALT 637 FOR MEDICARE OP): Performed by: INTERNAL MEDICINE

## 2025-04-19 PROCEDURE — 2500000004 HC RX 250 GENERAL PHARMACY W/ HCPCS (ALT 636 FOR OP/ED): Performed by: INTERNAL MEDICINE

## 2025-04-19 PROCEDURE — 85025 COMPLETE CBC W/AUTO DIFF WBC: CPT | Performed by: INTERNAL MEDICINE

## 2025-04-19 PROCEDURE — 2500000004 HC RX 250 GENERAL PHARMACY W/ HCPCS (ALT 636 FOR OP/ED): Mod: JZ | Performed by: INTERNAL MEDICINE

## 2025-04-19 PROCEDURE — 2500000004 HC RX 250 GENERAL PHARMACY W/ HCPCS (ALT 636 FOR OP/ED): Mod: JZ | Performed by: HOSPITALIST

## 2025-04-19 PROCEDURE — 99232 SBSQ HOSP IP/OBS MODERATE 35: CPT

## 2025-04-19 PROCEDURE — 99231 SBSQ HOSP IP/OBS SF/LOW 25: CPT

## 2025-04-19 PROCEDURE — 1200000002 HC GENERAL ROOM WITH TELEMETRY DAILY

## 2025-04-19 PROCEDURE — 36415 COLL VENOUS BLD VENIPUNCTURE: CPT | Performed by: INTERNAL MEDICINE

## 2025-04-19 PROCEDURE — 2500000002 HC RX 250 W HCPCS SELF ADMINISTERED DRUGS (ALT 637 FOR MEDICARE OP, ALT 636 FOR OP/ED): Performed by: INTERNAL MEDICINE

## 2025-04-19 PROCEDURE — 2500000004 HC RX 250 GENERAL PHARMACY W/ HCPCS (ALT 636 FOR OP/ED): Mod: JZ

## 2025-04-19 RX ORDER — GUAIFENESIN 600 MG/1
1200 TABLET, EXTENDED RELEASE ORAL 2 TIMES DAILY
Status: DISCONTINUED | OUTPATIENT
Start: 2025-04-19 | End: 2025-04-26

## 2025-04-19 RX ORDER — INSULIN LISPRO 100 [IU]/ML
4 INJECTION, SOLUTION INTRAVENOUS; SUBCUTANEOUS ONCE
Status: COMPLETED | OUTPATIENT
Start: 2025-04-19 | End: 2025-04-19

## 2025-04-19 RX ORDER — ALBUTEROL SULFATE 0.83 MG/ML
2.5 SOLUTION RESPIRATORY (INHALATION) EVERY 2 HOUR PRN
Status: DISCONTINUED | OUTPATIENT
Start: 2025-04-19 | End: 2025-04-28 | Stop reason: HOSPADM

## 2025-04-19 RX ORDER — ENOXAPARIN SODIUM 100 MG/ML
40 INJECTION SUBCUTANEOUS EVERY 24 HOURS
Status: DISCONTINUED | OUTPATIENT
Start: 2025-04-19 | End: 2025-04-28 | Stop reason: HOSPADM

## 2025-04-19 RX ORDER — PANTOPRAZOLE SODIUM 40 MG/10ML
40 INJECTION, POWDER, LYOPHILIZED, FOR SOLUTION INTRAVENOUS
Status: DISCONTINUED | OUTPATIENT
Start: 2025-04-19 | End: 2025-04-20

## 2025-04-19 RX ORDER — ALBUTEROL SULFATE 0.83 MG/ML
2.5 SOLUTION RESPIRATORY (INHALATION) EVERY 6 HOURS PRN
Status: DISCONTINUED | OUTPATIENT
Start: 2025-04-19 | End: 2025-04-19

## 2025-04-19 RX ADMIN — CIPROFLOXACIN 400 MG: 400 INJECTION, SOLUTION INTRAVENOUS at 06:04

## 2025-04-19 RX ADMIN — ENOXAPARIN SODIUM 40 MG: 40 INJECTION SUBCUTANEOUS at 11:52

## 2025-04-19 RX ADMIN — GUAIFENESIN 1200 MG: 600 TABLET ORAL at 18:15

## 2025-04-19 RX ADMIN — INSULIN LISPRO 4 UNITS: 100 INJECTION, SOLUTION INTRAVENOUS; SUBCUTANEOUS at 00:45

## 2025-04-19 RX ADMIN — MORPHINE SULFATE 1 MG: 2 INJECTION, SOLUTION INTRAMUSCULAR; INTRAVENOUS at 03:49

## 2025-04-19 RX ADMIN — OXYCODONE HYDROCHLORIDE 5 MG: 5 TABLET ORAL at 01:23

## 2025-04-19 RX ADMIN — PANTOPRAZOLE SODIUM 40 MG: 40 INJECTION, POWDER, FOR SOLUTION INTRAVENOUS at 08:08

## 2025-04-19 RX ADMIN — MORPHINE SULFATE 1 MG: 2 INJECTION, SOLUTION INTRAMUSCULAR; INTRAVENOUS at 14:48

## 2025-04-19 RX ADMIN — CIPROFLOXACIN 400 MG: 400 INJECTION, SOLUTION INTRAVENOUS at 19:34

## 2025-04-19 RX ADMIN — ONDANSETRON HYDROCHLORIDE 4 MG: 4 TABLET, FILM COATED ORAL at 13:50

## 2025-04-19 RX ADMIN — METRONIDAZOLE 500 MG: 500 INJECTION, SOLUTION INTRAVENOUS at 18:15

## 2025-04-19 RX ADMIN — MORPHINE SULFATE 1 MG: 2 INJECTION, SOLUTION INTRAMUSCULAR; INTRAVENOUS at 08:08

## 2025-04-19 RX ADMIN — DOCUSATE SODIUM 100 MG: 100 CAPSULE, LIQUID FILLED ORAL at 20:30

## 2025-04-19 RX ADMIN — ACETAMINOPHEN 650 MG: 325 TABLET ORAL at 13:51

## 2025-04-19 RX ADMIN — METRONIDAZOLE 500 MG: 500 INJECTION, SOLUTION INTRAVENOUS at 00:45

## 2025-04-19 RX ADMIN — MORPHINE SULFATE 1 MG: 2 INJECTION, SOLUTION INTRAMUSCULAR; INTRAVENOUS at 19:38

## 2025-04-19 RX ADMIN — OXYCODONE HYDROCHLORIDE 5 MG: 5 TABLET ORAL at 09:51

## 2025-04-19 RX ADMIN — DOCUSATE SODIUM 100 MG: 100 CAPSULE, LIQUID FILLED ORAL at 08:38

## 2025-04-19 RX ADMIN — ATORVASTATIN CALCIUM 20 MG: 20 TABLET, FILM COATED ORAL at 08:38

## 2025-04-19 RX ADMIN — LOSARTAN POTASSIUM 50 MG: 50 TABLET, FILM COATED ORAL at 08:38

## 2025-04-19 RX ADMIN — METRONIDAZOLE 500 MG: 500 INJECTION, SOLUTION INTRAVENOUS at 08:43

## 2025-04-19 RX ADMIN — PANTOPRAZOLE SODIUM 40 MG: 40 INJECTION, POWDER, FOR SOLUTION INTRAVENOUS at 17:42

## 2025-04-19 SDOH — ECONOMIC STABILITY: HOUSING INSECURITY: AT ANY TIME IN THE PAST 12 MONTHS, WERE YOU HOMELESS OR LIVING IN A SHELTER (INCLUDING NOW)?: NO

## 2025-04-19 SDOH — SOCIAL STABILITY: SOCIAL INSECURITY: WITHIN THE LAST YEAR, HAVE YOU BEEN HUMILIATED OR EMOTIONALLY ABUSED IN OTHER WAYS BY YOUR PARTNER OR EX-PARTNER?: NO

## 2025-04-19 SDOH — ECONOMIC STABILITY: FOOD INSECURITY: WITHIN THE PAST 12 MONTHS, THE FOOD YOU BOUGHT JUST DIDN'T LAST AND YOU DIDN'T HAVE MONEY TO GET MORE.: NEVER TRUE

## 2025-04-19 SDOH — ECONOMIC STABILITY: INCOME INSECURITY: IN THE PAST 12 MONTHS HAS THE ELECTRIC, GAS, OIL, OR WATER COMPANY THREATENED TO SHUT OFF SERVICES IN YOUR HOME?: NO

## 2025-04-19 SDOH — ECONOMIC STABILITY: FOOD INSECURITY: WITHIN THE PAST 12 MONTHS, YOU WORRIED THAT YOUR FOOD WOULD RUN OUT BEFORE YOU GOT THE MONEY TO BUY MORE.: NEVER TRUE

## 2025-04-19 SDOH — ECONOMIC STABILITY: HOUSING INSECURITY: IN THE LAST 12 MONTHS, WAS THERE A TIME WHEN YOU WERE NOT ABLE TO PAY THE MORTGAGE OR RENT ON TIME?: NO

## 2025-04-19 SDOH — SOCIAL STABILITY: SOCIAL INSECURITY: WITHIN THE LAST YEAR, HAVE YOU BEEN AFRAID OF YOUR PARTNER OR EX-PARTNER?: NO

## 2025-04-19 SDOH — ECONOMIC STABILITY: TRANSPORTATION INSECURITY: IN THE PAST 12 MONTHS, HAS LACK OF TRANSPORTATION KEPT YOU FROM MEDICAL APPOINTMENTS OR FROM GETTING MEDICATIONS?: NO

## 2025-04-19 SDOH — ECONOMIC STABILITY: HOUSING INSECURITY: IN THE PAST 12 MONTHS, HOW MANY TIMES HAVE YOU MOVED WHERE YOU WERE LIVING?: 0

## 2025-04-19 SDOH — ECONOMIC STABILITY: FOOD INSECURITY: HOW HARD IS IT FOR YOU TO PAY FOR THE VERY BASICS LIKE FOOD, HOUSING, MEDICAL CARE, AND HEATING?: NOT HARD AT ALL

## 2025-04-19 ASSESSMENT — COGNITIVE AND FUNCTIONAL STATUS - GENERAL
MOBILITY SCORE: 24
DAILY ACTIVITIY SCORE: 24

## 2025-04-19 ASSESSMENT — PAIN SCALES - GENERAL
PAINLEVEL_OUTOF10: 8
PAINLEVEL_OUTOF10: 10 - WORST POSSIBLE PAIN
PAINLEVEL_OUTOF10: 6
PAINLEVEL_OUTOF10: 8
PAINLEVEL_OUTOF10: 8
PAINLEVEL_OUTOF10: 7
PAINLEVEL_OUTOF10: 8
PAINLEVEL_OUTOF10: 4
PAINLEVEL_OUTOF10: 5 - MODERATE PAIN
PAINLEVEL_OUTOF10: 8
PAINLEVEL_OUTOF10: 9

## 2025-04-19 ASSESSMENT — PAIN - FUNCTIONAL ASSESSMENT
PAIN_FUNCTIONAL_ASSESSMENT: 0-10

## 2025-04-19 ASSESSMENT — ACTIVITIES OF DAILY LIVING (ADL)
LACK_OF_TRANSPORTATION: NO
LACK_OF_TRANSPORTATION: NO

## 2025-04-19 ASSESSMENT — PAIN DESCRIPTION - LOCATION
LOCATION: ABDOMEN

## 2025-04-19 ASSESSMENT — PAIN DESCRIPTION - DESCRIPTORS: DESCRIPTORS: ACHING

## 2025-04-19 ASSESSMENT — PAIN DESCRIPTION - ORIENTATION
ORIENTATION: RIGHT
ORIENTATION: RIGHT

## 2025-04-19 NOTE — PROGRESS NOTES
Yuni Jordan is a 54 y.o. female on day 0 of admission presenting with Acute cholecystitis.    Subjective   Seen and examined patient at bedside  Complaining of 10/10 RUQ pain unrevealed by IV morphine  Remains on 4 L NC  Surgery Team at bedside     Objective     Last Recorded Vitals  /76 (BP Location: Right arm, Patient Position: Lying)   Pulse 99   Temp 37.4 °C (99.3 °F) (Temporal)   Resp 17   Wt 89.8 kg (197 lb 15.6 oz)   SpO2 97%   Intake/Output last 3 Shifts:    Intake/Output Summary (Last 24 hours) at 4/19/2025 0917  Last data filed at 4/19/2025 0211  Gross per 24 hour   Intake 1900 ml   Output 10 ml   Net 1890 ml       Admission Weight  Weight: 89.8 kg (198 lb) (04/18/25 1310)    Daily Weight  04/18/25 : 89.8 kg (197 lb 15.6 oz)    Image Results  CT abdomen pelvis wo IV contrast  Narrative: Interpreted By:  Dane Johns,   STUDY:  CT ABDOMEN PELVIS WO IV CONTRAST;  4/18/2025 4:32 pm      INDICATION:  Signs/Symptoms:abdominal pain.          COMPARISON:  04/15/2025      ACCESSION NUMBER(S):  [id number]      ORDERING CLINICIAN:  CHERYL LUZ      TECHNIQUE:  Contiguous axial images of the abdomen and pelvis were obtained  without intravenous contrast. Coronal and sagittal reformatted images  were reconstructed from the axial data.      FINDINGS:  Note: The absence of intravenous contrast limits evaluation of the  solid organs and vasculature.      LOWER CHEST: No acute abnormality.          ABDOMEN/PELVIS:      ABDOMINAL WALL: No significant abnormality.      LIVER: The liver demonstrates a normal noncontrast attenuation.      BILE DUCTS: No significant intrahepatic or extrahepatic dilatation.      GALLBLADDER: The gallbladder is dilated. The wall is diffusely  thickened. There is pericholecystic fat stranding and fluid. There is  cholelithiasis. There is a gallstone in the neck the gallbladder.      PANCREAS: No significant abnormality.      SPLEEN: No significant abnormality.       ADRENALS: No significant abnormality.      KIDNEYS, URETERS, BLADDER:  No nephroureterolithiasis or  hydroureteronephrosis. The bladder wall is normal thickness for  degree of distention.      REPRODUCTIVE ORGANS: Subcentimeter uterine fibroid versus phlebolith  in the subserosal veins.      VESSELS: Mild aortic atherosclerosis without AAA.      RETROPERITONEUM/LYMPH NODES: No acute retroperitoneal abnormality. No  enlarged lymph nodes.      BOWEL/PERITONEUM: Postsurgical changes of Nadja-en-Y gastric bypass  with antecolic gastrojejunostomy. No inflammatory bowel wall  thickening or dilatation. Normal diameter appendix without  inflammation. There is appendicolith material in the appendix.      No ascites, free air, or fluid collection.          MUSCULOSKELETAL: No acute osseous abnormality.  No suspicious osseous  lesion.      Impression: Severe acute cholecystitis with gallbladder dilatation, wall  thickening, pericholecystic fluid and gallstones. Hyperdense stone  seen in the cystic duct. Surgical consultation recommended.                  MACRO:  None.      Signed by: Dane Johns 4/18/2025 4:44 PM  Dictation workstation:   [id number]      Physical Exam  Constitutional: NAD, pt alert and cooperative  Eyes: no icterus  ENMT: mucous membranes moist, no lesions seen  Head/Neck: Neck supple  Respiratory/Thorax: CTA bilaterally, non-labored breathing, no cough, on RA  Cardiovascular: Regular rate and rhythm, no murmurs heard  Gastrointestinal: Nondistended, soft, TTP RUQ, BS present x 4, surgical sites CDI  : no [place], no SP discomfort  Musculoskeletal: ROM intact, no joint swelling  Extremities: normal extremities, no edema  Neurological: A&O x 3, speech clear, follows commands appropriately, cr. n. grossly intact, sensation grossly intact, motor 5/5 throughout  Skin: Warm and dry, no lesions, no rashes  Psych: calm, stable mood    Relevant Results  Scheduled medications  Scheduled  Medications[1]  Continuous medications  Continuous Medications[2]  PRN medications  PRN Medications[3]  Results for orders placed or performed during the hospital encounter of 04/18/25 (from the past 24 hours)   CBC and Auto Differential   Result Value Ref Range    WBC 25.6 (H) 4.4 - 11.3 x10*3/uL    nRBC 0.0 0.0 - 0.0 /100 WBCs    RBC 4.45 4.00 - 5.20 x10*6/uL    Hemoglobin 13.6 12.0 - 16.0 g/dL    Hematocrit 40.8 36.0 - 46.0 %    MCV 92 80 - 100 fL    MCH 30.6 26.0 - 34.0 pg    MCHC 33.3 32.0 - 36.0 g/dL    RDW 13.2 11.5 - 14.5 %    Platelets 269 150 - 450 x10*3/uL    Neutrophils % 88.9 40.0 - 80.0 %    Immature Granulocytes %, Automated 0.6 0.0 - 0.9 %    Lymphocytes % 3.6 13.0 - 44.0 %    Monocytes % 6.7 2.0 - 10.0 %    Eosinophils % 0.0 0.0 - 6.0 %    Basophils % 0.2 0.0 - 2.0 %    Neutrophils Absolute 22.77 (H) 1.20 - 7.70 x10*3/uL    Immature Granulocytes Absolute, Automated 0.16 0.00 - 0.70 x10*3/uL    Lymphocytes Absolute 0.92 (L) 1.20 - 4.80 x10*3/uL    Monocytes Absolute 1.72 (H) 0.10 - 1.00 x10*3/uL    Eosinophils Absolute 0.00 0.00 - 0.70 x10*3/uL    Basophils Absolute 0.04 0.00 - 0.10 x10*3/uL   Comprehensive metabolic panel   Result Value Ref Range    Glucose 167 (H) 74 - 99 mg/dL    Sodium 138 136 - 145 mmol/L    Potassium 3.9 3.5 - 5.3 mmol/L    Chloride 104 98 - 107 mmol/L    Bicarbonate 21 21 - 32 mmol/L    Anion Gap 17 10 - 20 mmol/L    Urea Nitrogen 13 6 - 23 mg/dL    Creatinine 0.70 0.50 - 1.05 mg/dL    eGFR >90 >60 mL/min/1.73m*2    Calcium 9.4 8.6 - 10.3 mg/dL    Albumin 4.4 3.4 - 5.0 g/dL    Alkaline Phosphatase 114 (H) 33 - 110 U/L    Total Protein 7.4 6.4 - 8.2 g/dL    AST 16 9 - 39 U/L    Bilirubin, Total 0.7 0.0 - 1.2 mg/dL    ALT 14 7 - 45 U/L   Lipase   Result Value Ref Range    Lipase 15 9 - 82 U/L   Troponin I, High Sensitivity, Initial   Result Value Ref Range    Troponin I, High Sensitivity 7 0 - 13 ng/L   POCT GLUCOSE   Result Value Ref Range    POCT Glucose 179 (H) 74 - 99 mg/dL    Sterile Fluid Culture/Smear    Specimen: BILE FLUID   Result Value Ref Range    Gram Stain No polymorphonuclear leukocytes seen (A)     Gram Stain (3+) Moderate Gram variable bacilli (A)    POCT GLUCOSE   Result Value Ref Range    POCT Glucose 193 (H) 74 - 99 mg/dL   POCT GLUCOSE   Result Value Ref Range    POCT Glucose 220 (H) 74 - 99 mg/dL   Basic metabolic panel   Result Value Ref Range    Glucose 145 (H) 74 - 99 mg/dL    Sodium 137 136 - 145 mmol/L    Potassium 3.8 3.5 - 5.3 mmol/L    Chloride 104 98 - 107 mmol/L    Bicarbonate 22 21 - 32 mmol/L    Anion Gap 15 10 - 20 mmol/L    Urea Nitrogen 16 6 - 23 mg/dL    Creatinine 0.74 0.50 - 1.05 mg/dL    eGFR >90 >60 mL/min/1.73m*2    Calcium 9.0 8.6 - 10.3 mg/dL   CBC and Auto Differential   Result Value Ref Range    WBC 27.3 (H) 4.4 - 11.3 x10*3/uL    nRBC 0.0 0.0 - 0.0 /100 WBCs    RBC 3.94 (L) 4.00 - 5.20 x10*6/uL    Hemoglobin 12.0 12.0 - 16.0 g/dL    Hematocrit 36.8 36.0 - 46.0 %    MCV 93 80 - 100 fL    MCH 30.5 26.0 - 34.0 pg    MCHC 32.6 32.0 - 36.0 g/dL    RDW 13.5 11.5 - 14.5 %    Platelets 213 150 - 450 x10*3/uL    Neutrophils % 86.5 40.0 - 80.0 %    Immature Granulocytes %, Automated 0.8 0.0 - 0.9 %    Lymphocytes % 6.5 13.0 - 44.0 %    Monocytes % 6.1 2.0 - 10.0 %    Eosinophils % 0.0 0.0 - 6.0 %    Basophils % 0.1 0.0 - 2.0 %    Neutrophils Absolute 23.60 (H) 1.20 - 7.70 x10*3/uL    Immature Granulocytes Absolute, Automated 0.22 0.00 - 0.70 x10*3/uL    Lymphocytes Absolute 1.77 1.20 - 4.80 x10*3/uL    Monocytes Absolute 1.66 (H) 0.10 - 1.00 x10*3/uL    Eosinophils Absolute 0.00 0.00 - 0.70 x10*3/uL    Basophils Absolute 0.04 0.00 - 0.10 x10*3/uL     CT abdomen pelvis wo IV contrast  Result Date: 4/18/2025  Interpreted By:  Dane Johns, STUDY: CT ABDOMEN PELVIS WO IV CONTRAST;  4/18/2025 4:32 pm   INDICATION: Signs/Symptoms:abdominal pain.     COMPARISON: 04/15/2025   ACCESSION NUMBER(S): [id number]   ORDERING CLINICIAN: CHERYL LUZ    TECHNIQUE: Contiguous axial images of the abdomen and pelvis were obtained without intravenous contrast. Coronal and sagittal reformatted images were reconstructed from the axial data.   FINDINGS: Note: The absence of intravenous contrast limits evaluation of the solid organs and vasculature.   LOWER CHEST: No acute abnormality.     ABDOMEN/PELVIS:   ABDOMINAL WALL: No significant abnormality.   LIVER: The liver demonstrates a normal noncontrast attenuation.   BILE DUCTS: No significant intrahepatic or extrahepatic dilatation.   GALLBLADDER: The gallbladder is dilated. The wall is diffusely thickened. There is pericholecystic fat stranding and fluid. There is cholelithiasis. There is a gallstone in the neck the gallbladder.   PANCREAS: No significant abnormality.   SPLEEN: No significant abnormality.   ADRENALS: No significant abnormality.   KIDNEYS, URETERS, BLADDER:  No nephroureterolithiasis or hydroureteronephrosis. The bladder wall is normal thickness for degree of distention.   REPRODUCTIVE ORGANS: Subcentimeter uterine fibroid versus phlebolith in the subserosal veins.   VESSELS: Mild aortic atherosclerosis without AAA.   RETROPERITONEUM/LYMPH NODES: No acute retroperitoneal abnormality. No enlarged lymph nodes.   BOWEL/PERITONEUM: Postsurgical changes of Nadja-en-Y gastric bypass with antecolic gastrojejunostomy. No inflammatory bowel wall thickening or dilatation. Normal diameter appendix without inflammation. There is appendicolith material in the appendix.   No ascites, free air, or fluid collection.     MUSCULOSKELETAL: No acute osseous abnormality.  No suspicious osseous lesion.       Severe acute cholecystitis with gallbladder dilatation, wall thickening, pericholecystic fluid and gallstones. Hyperdense stone seen in the cystic duct. Surgical consultation recommended.         MACRO: None.   Signed by: Dane Johns 4/18/2025 4:44 PM Dictation workstation:   [id number]    Assessment & Plan  Acute  cholecystitis    Yuni Jordan is a 54 y.o. female with multiple medical comorbid conditions to include morbid obesity and multiple previous abdominal operations to include Nadja-en-Y gastric bypass.  Recently admitted to the hospital 4/16/25 where she underwent upper endoscopy where she was found to have a gastric ulcer.  She comes back now with severe right upper quadrant abdominal pain associated with some nausea.  Workup includes a CT scan that shows severe acute cholecystitis, cholelithiasis. She was taken to OR s/p cholecystectomy, laparoscopic: Acute Gangrenous cholecystitis , cholelithiasis     #Acute cholecytitis, cholelithiasis s/p cholecystecomty laparoscopic POD 1  - still in svere pain , RUQ pain  - requriing  IV pain medications  - remains on 4 L NC post operatively; wean as able  - PRN pain medications   - PRN Anti emetic meds   - Bowel regimen in place  - Ambulate as able  - ATB: Ciprofloxacin, Flagyl  - Surgery team following    #HTN  - stable; continue home regimen    GI/VTE PPX: PPI, SQ lovenox  Code Status: Full Code    Chart, medical history, and labs/testing reviewed in detail.   Case and plan of care discussed with attending provider, Dr Jeffers      Disposition: Discharge home once medically cleared and stable, pending surgical clearance    VINCENT Werner-CNP   Observation/Internal Med [facility]  [facility]  04/19/25  9:23 AM  Total time of 45 minutes spent on professional and overall care, with >50% of time dedicated to counseling/coordination of care.         [1] atorvastatin, 20 mg, oral, Daily  ciprofloxacin, 400 mg, intravenous, q12h  docusate sodium, 100 mg, oral, BID  losartan, 50 mg, oral, Daily  metroNIDAZOLE, 500 mg, intravenous, q8h  [Held by provider] pantoprazole, 40 mg, oral, BID AC  pantoprazole, 40 mg, intravenous, Daily  [2]    [3] PRN medications: acetaminophen **OR** acetaminophen **OR** acetaminophen, morphine, ondansetron **OR** ondansetron, oxyCODONE,  polyethylene glycol

## 2025-04-19 NOTE — PROGRESS NOTES
04/19/25 1217   Discharge Planning   Living Arrangements Alone   Support Systems Family members;Friends/neighbors   Assistance Needed PTA; Independent w/ ADL/IADL 's, no needs for asst device,   Type of Residence Private residence  (demo correct)   Number of Stairs to Enter Residence 2   Number of Stairs Within Residence 0   Home or Post Acute Services None   Expected Discharge Disposition Home  (no needs anticipated at this time)   Does the patient need discharge transport arranged? No   Housing Stability   In the last 12 months, was there a time when you were not able to pay the mortgage or rent on time? N   In the past 12 months, how many times have you moved where you were living? 0   At any time in the past 12 months, were you homeless or living in a shelter (including now)? N   Transportation Needs   In the past 12 months, has lack of transportation kept you from medical appointments or from getting medications? no  (PCP listed; [facility]; drives self to appointments)   In the past 12 months, has lack of transportation kept you from meetings, work, or from getting things needed for daily living? No   Stroke Family Assessment   Stroke Family Assessment Needed No   Intensity of Service   Intensity of Service 0-30 min     Care Coordinator Note:  Met patient at bedside to discuss discharge planning, and how patient manage health at home   Plan: POD 1 CHOLECYSTECTOMY, LAPAROSCOPIC Findings: Gangrenous  Acute cholecystitis ; per general surgery chart review;  Dr. Muhammad will continue to follow for another day of pain control and IV ABX, anticipate home in the next couple days.   Status: Observation d/t acute cholecystitis   Payor: [employer] [place]   Disposition: home    Lisbeth ESTRADA, RN [facility]

## 2025-04-19 NOTE — CARE PLAN
The patient's goals for the shift include      The clinical goals for the shift include remain hds and pain control      Problem: Pain - Adult  Goal: Verbalizes/displays adequate comfort level or baseline comfort level  Outcome: Progressing     Problem: Safety - Adult  Goal: Free from fall injury  Outcome: Progressing     Problem: Discharge Planning  Goal: Discharge to home or other facility with appropriate resources  Outcome: Progressing     Problem: Chronic Conditions and Co-morbidities  Goal: Patient's chronic conditions and co-morbidity symptoms are monitored and maintained or improved  Outcome: Progressing     Problem: Nutrition  Goal: Nutrient intake appropriate for maintaining nutritional needs  Outcome: Progressing     Problem: Diabetes  Goal: Achieve decreasing blood glucose levels by end of shift  Outcome: Progressing  Goal: Increase stability of blood glucose readings by end of shift  Outcome: Progressing  Goal: Decrease in ketones present in urine by end of shift  Outcome: Progressing  Goal: Maintain electrolyte levels within acceptable range throughout shift  Outcome: Progressing  Goal: Maintain glucose levels >70mg/dl to <250mg/dl throughout shift  Outcome: Progressing  Goal: No changes in neurological exam by end of shift  Outcome: Progressing  Goal: Learn about and adhere to nutrition recommendations by end of shift  Outcome: Progressing  Goal: Vital signs within normal range for age by end of shift  Outcome: Progressing  Goal: Increase self care and/or family involovement by end of shift  Outcome: Progressing  Goal: Receive DSME education by end of shift  Outcome: Progressing     Problem: Pain  Goal: Takes deep breaths with improved pain control throughout the shift  Outcome: Progressing  Goal: Turns in bed with improved pain control throughout the shift  Outcome: Progressing  Goal: Walks with improved pain control throughout the shift  Outcome: Progressing  Goal: Performs ADL's with improved pain  control throughout shift  Outcome: Progressing  Goal: Participates in PT with improved pain control throughout the shift  Outcome: Progressing  Goal: Free from opioid side effects throughout the shift  Outcome: Progressing  Goal: Free from acute confusion related to pain meds throughout the shift  Outcome: Progressing

## 2025-04-19 NOTE — PROGRESS NOTES
04/19/25 1217   [facility] Disability Status   Are you deaf or do you have serious difficulty hearing? N   Are you blind or do you have serious difficulty seeing, even when wearing glasses? N   Because of a physical, mental, or emotional condition, do you have serious difficulty concentrating, remembering, or making decisions? (5 years old or older) N   Do you have serious difficulty walking or climbing stairs? N   Do you have serious difficulty dressing or bathing? N   Because of a physical, mental, or emotional condition, do you have serious difficulty doing errands alone such as visiting the doctor? N

## 2025-04-19 NOTE — PROGRESS NOTES
"Yuni Jordan is a 54 y.o. female on day 0 of admission presenting with Acute cholecystitis.    Subjective   Patient is awake in bed reports a lot of RUQ pain still. She say this pain is different than her incisional pain. She denies having any appetite        Objective     Physical Exam  Constitutional:       Appearance: Normal appearance.   Cardiovascular:      Rate and Rhythm: Normal rate and regular rhythm.   Pulmonary:      Effort: Pulmonary effort is normal.      Comments: Non labored breathing on RA  Abdominal:      General: There is distension.      Palpations: Abdomen is soft.      Tenderness: There is no abdominal tenderness.      Comments: Lap sites C/D/I, edges well approximated, covered in Dermabond.   Skin:     General: Skin is warm and dry.   Neurological:      General: No focal deficit present.      Mental Status: She is alert and oriented to person, place, and time. Mental status is at baseline.   Psychiatric:         Attention and Perception: Attention normal.         Mood and Affect: Mood normal.         Speech: Speech normal.         Behavior: Behavior normal.         Cognition and Memory: Cognition normal.         Last Recorded Vitals  Blood pressure 161/76, pulse 99, temperature 37.4 °C (99.3 °F), temperature source Temporal, resp. rate 17, height 1.575 m (5' 2\"), weight 89.8 kg (197 lb 15.6 oz), SpO2 97%.  Intake/Output last 3 Shifts:  I/O last 3 completed shifts:  In: 1900 (21.2 mL/kg) [I.V.:1600 (17.8 mL/kg); IV Piggyback:300]  Out: 10 (0.1 mL/kg) [Blood:10]  Weight: 89.8 kg     Relevant Results  Scheduled medications  Scheduled Medications[1]  Continuous medications  Continuous Medications[2]  PRN medications  PRN Medications[3]   Results for orders placed or performed during the hospital encounter of 04/18/25 (from the past 24 hours)   CBC and Auto Differential   Result Value Ref Range    WBC 25.6 (H) 4.4 - 11.3 x10*3/uL    nRBC 0.0 0.0 - 0.0 /100 WBCs    RBC 4.45 4.00 - 5.20 x10*6/uL "    Hemoglobin 13.6 12.0 - 16.0 g/dL    Hematocrit 40.8 36.0 - 46.0 %    MCV 92 80 - 100 fL    MCH 30.6 26.0 - 34.0 pg    MCHC 33.3 32.0 - 36.0 g/dL    RDW 13.2 11.5 - 14.5 %    Platelets 269 150 - 450 x10*3/uL    Neutrophils % 88.9 40.0 - 80.0 %    Immature Granulocytes %, Automated 0.6 0.0 - 0.9 %    Lymphocytes % 3.6 13.0 - 44.0 %    Monocytes % 6.7 2.0 - 10.0 %    Eosinophils % 0.0 0.0 - 6.0 %    Basophils % 0.2 0.0 - 2.0 %    Neutrophils Absolute 22.77 (H) 1.20 - 7.70 x10*3/uL    Immature Granulocytes Absolute, Automated 0.16 0.00 - 0.70 x10*3/uL    Lymphocytes Absolute 0.92 (L) 1.20 - 4.80 x10*3/uL    Monocytes Absolute 1.72 (H) 0.10 - 1.00 x10*3/uL    Eosinophils Absolute 0.00 0.00 - 0.70 x10*3/uL    Basophils Absolute 0.04 0.00 - 0.10 x10*3/uL   Comprehensive metabolic panel   Result Value Ref Range    Glucose 167 (H) 74 - 99 mg/dL    Sodium 138 136 - 145 mmol/L    Potassium 3.9 3.5 - 5.3 mmol/L    Chloride 104 98 - 107 mmol/L    Bicarbonate 21 21 - 32 mmol/L    Anion Gap 17 10 - 20 mmol/L    Urea Nitrogen 13 6 - 23 mg/dL    Creatinine 0.70 0.50 - 1.05 mg/dL    eGFR >90 >60 mL/min/1.73m*2    Calcium 9.4 8.6 - 10.3 mg/dL    Albumin 4.4 3.4 - 5.0 g/dL    Alkaline Phosphatase 114 (H) 33 - 110 U/L    Total Protein 7.4 6.4 - 8.2 g/dL    AST 16 9 - 39 U/L    Bilirubin, Total 0.7 0.0 - 1.2 mg/dL    ALT 14 7 - 45 U/L   Lipase   Result Value Ref Range    Lipase 15 9 - 82 U/L   Troponin I, High Sensitivity, Initial   Result Value Ref Range    Troponin I, High Sensitivity 7 0 - 13 ng/L   POCT GLUCOSE   Result Value Ref Range    POCT Glucose 179 (H) 74 - 99 mg/dL   Sterile Fluid Culture/Smear    Specimen: BILE FLUID   Result Value Ref Range    Gram Stain No polymorphonuclear leukocytes seen (A)     Gram Stain (3+) Moderate Gram variable bacilli (A)    POCT GLUCOSE   Result Value Ref Range    POCT Glucose 193 (H) 74 - 99 mg/dL   POCT GLUCOSE   Result Value Ref Range    POCT Glucose 220 (H) 74 - 99 mg/dL   Basic metabolic  panel   Result Value Ref Range    Glucose 145 (H) 74 - 99 mg/dL    Sodium 137 136 - 145 mmol/L    Potassium 3.8 3.5 - 5.3 mmol/L    Chloride 104 98 - 107 mmol/L    Bicarbonate 22 21 - 32 mmol/L    Anion Gap 15 10 - 20 mmol/L    Urea Nitrogen 16 6 - 23 mg/dL    Creatinine 0.74 0.50 - 1.05 mg/dL    eGFR >90 >60 mL/min/1.73m*2    Calcium 9.0 8.6 - 10.3 mg/dL   CBC and Auto Differential   Result Value Ref Range    WBC 27.3 (H) 4.4 - 11.3 x10*3/uL    nRBC 0.0 0.0 - 0.0 /100 WBCs    RBC 3.94 (L) 4.00 - 5.20 x10*6/uL    Hemoglobin 12.0 12.0 - 16.0 g/dL    Hematocrit 36.8 36.0 - 46.0 %    MCV 93 80 - 100 fL    MCH 30.5 26.0 - 34.0 pg    MCHC 32.6 32.0 - 36.0 g/dL    RDW 13.5 11.5 - 14.5 %    Platelets 213 150 - 450 x10*3/uL    Neutrophils % 86.5 40.0 - 80.0 %    Immature Granulocytes %, Automated 0.8 0.0 - 0.9 %    Lymphocytes % 6.5 13.0 - 44.0 %    Monocytes % 6.1 2.0 - 10.0 %    Eosinophils % 0.0 0.0 - 6.0 %    Basophils % 0.1 0.0 - 2.0 %    Neutrophils Absolute 23.60 (H) 1.20 - 7.70 x10*3/uL    Immature Granulocytes Absolute, Automated 0.22 0.00 - 0.70 x10*3/uL    Lymphocytes Absolute 1.77 1.20 - 4.80 x10*3/uL    Monocytes Absolute 1.66 (H) 0.10 - 1.00 x10*3/uL    Eosinophils Absolute 0.00 0.00 - 0.70 x10*3/uL    Basophils Absolute 0.04 0.00 - 0.10 x10*3/uL      CT abdomen pelvis wo IV contrast   Final Result   Severe acute cholecystitis with gallbladder dilatation, wall   thickening, pericholecystic fluid and gallstones. Hyperdense stone   seen in the cystic duct. Surgical consultation recommended.                       MACRO:   None.        Signed by: Dane Johns 4/18/2025 4:44 PM   Dictation workstation:   [id number]                              Assessment & Plan  Acute cholecystitis    Cholecystitis    Plan: POD 1 CHOLECYSTECTOMY, LAPAROSCOPIC Findings: Gangrenous  Acute cholecystitis   - Okay for low fat diet  - Continue IV ABX  - PRN pain control  - PRN antiemetic  - Encourage OOB  - Encourage IS  - DVT ppx:  SCDs/ lovenox    Discussed with Dr. Muhammad will continue to follow for another day of pain control and IV ABX, anticipate home in the next couple days.       I spent 35 minutes in the professional and overall care of this patient.      Pineda Jack PA-C    Ongoing pain control  Continue abx  Trend leukocytosis  Repeat LFTs tomorrow       [1] atorvastatin, 20 mg, oral, Daily  ciprofloxacin, 400 mg, intravenous, q12h  docusate sodium, 100 mg, oral, BID  enoxaparin, 40 mg, subcutaneous, q24h  losartan, 50 mg, oral, Daily  metroNIDAZOLE, 500 mg, intravenous, q8h  [Held by provider] pantoprazole, 40 mg, oral, BID AC  pantoprazole, 40 mg, intravenous, Daily  [2]    [3] PRN medications: acetaminophen **OR** acetaminophen **OR** acetaminophen, morphine, ondansetron **OR** ondansetron, oxyCODONE, polyethylene glycol

## 2025-04-19 NOTE — H&P
[facility] OBSERVATION H&P      This visit is  conducted with the use of interactive audio and video telecommunications  between the patient and the provider.  Patient consent for virtual  visit obtained.    HPI: Yuni Jordan is a 54 y.o. female, with a PMH ofHTN HLD   morbid obesity s/p Nadja-en-Y gastric bypass  h/of anastomotic ulcers  who presented to [facility] ED on 4/18/2025 for abdominal pain. She was jsut discharge last 4/16- after she initially presented  with n/v  and episodes of diarrhea. CT showed inflammation involving the jejunal portion of the gastrojejunostomy with potential ulceration; a focal gas-filled component along the upper inner margin. GI was consulted and had EGD done with showed   Single large, superficial, benign-appearing ulcer in the gastrojejunostomy with clean base (Fabio III)  Biopsied gastric remnant for H.pylori    She represented to [facility] lastnight  with c/o of abdominal pain and continued trouble eating, c/o of bloating. CT was done showing The gallbladder is dilated. The wall is diffusely   thickened. There is pericholecystic fat stranding and fluid. There is  cholelithiasis. There is a gallstone in the neck the gallbladder.     She was taken to OR s/p cholecystectomy, laparoscopic: Acute Gangrenous cholecystitis , cholelithiasis  SEE OP note:         Patient History   PMH: She has a past medical history of Asthma, Diabetes mellitus (Multi), Eczema (1971), GERD (gastroesophageal reflux disease) (2012), ARTUR (obstructive sleep apnea), and Peptic ulceration (2015).  PSH: She has a past surgical history that includes Hysterectomy (02/12/2004); Hernia repair; Colonoscopy (04/14/2021); Ovarian cyst removal; Myomectomy; Bariatric Surgery (12/09/2024); Esophagogastroduodenoscopy; Hiatal hernia repair (04/05/2016); Bariatric Surgery (09/19/2017); and Soft tissue mass excision (10/19/2011).  SH: She reports that she has been smoking cigars. She has never used smokeless  "tobacco. She reports that she does not currently use alcohol. She reports that she does not use drugs.  FH: family history includes Asthma in her father and mother; Breast cancer (age of onset: 77) in her mother's sister; Cancer in her mother; Diabetes in her mother; Heart disease in her father; Heart failure in her father and mother; Hypertension in her father and mother; Stroke in her mother.     RX Allergies[1]  Current Outpatient Medications   Medication Instructions    albuterol (ProAir HFA) 90 mcg/actuation inhaler 1-2 puffs    atorvastatin (Lipitor) 20 mg tablet TAKE 1 TABLET BY MOUTH EVERY DAY    cyclobenzaprine (FLEXERIL) 10 mg, oral, Nightly PRN    losartan (COZAAR) 50 mg, oral, Daily    multivitamin tablet 1 tablet, oral, Daily    multivitamin with minerals tablet 1 tablet, oral, Daily    pantoprazole (ProtoNix) 40 mg EC tablet Take 1 tablet (40 mg) by mouth 2 times a day before meals for 30 days, THEN 1 tablet (40 mg) once daily in the morning. Take before meals. Do not crush, chew, or split.     Review of Systems   ROS: 10-point review of systems was performed and is otherwise negative except as noted in HPI.        Heart Rate:  []   Temp:  [36.2 °C (97.2 °F)-37 °C (98.6 °F)]   Resp:  [14-23]   BP: (151-192)/(68-85)   Height:  [157.5 cm (5' 2\")]   Weight:  [89.8 kg (197 lb 15.6 oz)-89.8 kg (198 lb)]   SpO2:  [94 %-99 %]   Oxygen Dose: *4 L/min  0-10 (Numeric) Pain Score: 8   Vitals:    04/18/25 1807   Weight: 89.8 kg (197 lb 15.6 oz)        Physical Exam:  Vitals and nursing notes reviewed. Post operative    GENERAL: post op  Answering questions appropriately   Showing s/s of discomfort and grimace   Calm        Medications  Scheduled Medications[2]Continuous Medications[3]PRN Medications[4]    Diagnostic Results   CBC- 4/18/2025:  2:39 PM  25.6 13.6 269    40.8      BMP- 4/18/2025:  2:39 PM  138 13 _ 167   3.9 0.70 21    Estimated Creatinine Clearance: 96.8 mL/min (by C-G formula based on SCr of " 0.7 mg/dL).     CA: 9.4 PROTIEN: 7.4 ALT: 14 Total Bili: 0.7 Mg: _   PHOS: _ ALBUMIN: 4.4 AST: 16   Alk Phos: 114      COAGS- No results in last year.  _   _ _     CV Labs  Troponin I, High Sensitivity   Date/Time Value Ref Range Status   04/18/2025 02:39 PM 7 0 - 13 ng/L Final     POC HEMOGLOBIN A1c   Date/Time Value Ref Range Status   04/15/2025 01:59 PM 7.0 (A) 4.2 - 6.5 % Final   09/05/2023 03:36 PM 6.2 4.2 - 6.5 % Final           Assessment/Plan   Abdominal Pain 2/2 Acute cholecystitis, Cholelithiasis  s/p Cholecystectomy laparoscopic; ,  Acute Gangrenous cholecystitis ,   Acute Pain  Recently diagnose with  Gastric Ulcer  Hx of HTN HLD obesity, Nadja-en-Y gastric bypass.     PLAN:  -admit to the hospital   -PRN pain medications   -PRN Anti emetic meds   -ATB: Ciprofloxacin, Flagyl  -NPO per surgery clearance  -Surgery Team FF  -PPI-continue  -Continue home HTN medication        GI/VTE PPX: PPI, SQ revisit tomorrow   Code Status: Full Code    Chart, medical history, and labs/testing reviewed in detail.   Case and plan of care to be discussed with attending provider.      Disposition: Discharge per day team     VINCENT Sewell-CNP   Observation/Internal Med [facility]  [facility]  04/18/25  11:47 PM  Total time of 45 minutes spent on professional and overall care, with >50% of time dedicated to counseling/coordination of care.          [1]   Allergies  Allergen Reactions    Aspirin Other    Iodides Unknown    Iodinated Contrast Media Other    Iodine Unknown    Nut - Unspecified Itching    Penicillins Other    Shellfish Containing Products Unknown     HA/ passes out   [2] [START ON 4/19/2025] atorvastatin, 20 mg, oral, Daily  ciprofloxacin, 400 mg, intravenous, q12h  docusate sodium, 100 mg, oral, BID  [START ON 4/19/2025] losartan, 50 mg, oral, Daily  [START ON 4/19/2025] metroNIDAZOLE, 500 mg, intravenous, q8h  [START ON 4/19/2025] pantoprazole, 40 mg, oral, BID AC  pantoprazole, 40 mg, intravenous,  Daily  [3]    [4] PRN medications: acetaminophen **OR** acetaminophen **OR** acetaminophen, morphine, ondansetron **OR** ondansetron, oxyCODONE, polyethylene glycol

## 2025-04-19 NOTE — DISCHARGE INSTRUCTIONS
Instructions After Laparoscopic Surgery  Wound Care:  You have a portion of your incision that is now open to improve the redness around your incision and prevent infection   - please keep wound clean and dry throughout the day  - please shower daily, remove guaze packing prior to or during shower If the packing becomes dry and painful to remove  - let the water hit the inside of the incision, you do not need to intentionally place soap in the opening of the wound  - when you get off the shower please pat dry around the outside and replace guaze, okay to cover with abdominal bandage, you do not need tape, you may use underwear to hold into place  - DO NOT SOAK with the open incision   - The hole with gradually get smaller, okay to use less guaze as this happens  -do not apply topical creams or ointments  -call if you notice redness around wound, foul-smelliing drainage, or increasing pain   - plan is to see Dr. Walsh in the office in 7-10 days after discharge from the hospital  Diet:          -Avoid greasy, fatty foods first few weeks          -[place], soft meals first day or two after surgery  Activity          -Take it easy for the first 48 hours          -stairs and walks are fine          -resume activities gradually over the first week          -ask Dr Walsh before resuming strenuous physical exertions          -No driving while on pain medication  Medications          -Pain medicine prescription attached for severe pain          -You can also take Motrin/Ibuprofen 400mg every 6 hours for mild pain          -Resume your home medications unless otherwise directed          -To avoid constipation please take Colace 100mg twice a day (over the counter)  Other Instructions          -Call to make appointment within 1-2 days: [phone]          -Call the doctor for the following:   severe unrelieved pain   fevers > 101F   Nausea/vomiting   wound issues   insurance/return to work forms   shortness of breath   chest  pains

## 2025-04-20 VITALS
OXYGEN SATURATION: 93 % | HEART RATE: 83 BPM | RESPIRATION RATE: 18 BRPM | HEIGHT: 62 IN | SYSTOLIC BLOOD PRESSURE: 146 MMHG | WEIGHT: 197.97 LBS | BODY MASS INDEX: 36.43 KG/M2 | DIASTOLIC BLOOD PRESSURE: 71 MMHG | TEMPERATURE: 97.2 F

## 2025-04-20 LAB
ALBUMIN SERPL BCP-MCNC: 3.5 G/DL (ref 3.4–5)
ALP SERPL-CCNC: 91 U/L (ref 33–110)
ALT SERPL W P-5'-P-CCNC: 30 U/L (ref 7–45)
ANION GAP SERPL CALC-SCNC: 11 MMOL/L (ref 10–20)
AST SERPL W P-5'-P-CCNC: 32 U/L (ref 9–39)
BACTERIA FLD CULT: ABNORMAL
BASOPHILS # BLD AUTO: 0.06 X10*3/UL (ref 0–0.1)
BASOPHILS NFR BLD AUTO: 0.3 %
BILIRUB SERPL-MCNC: 0.5 MG/DL (ref 0–1.2)
BUN SERPL-MCNC: 15 MG/DL (ref 6–23)
CALCIUM SERPL-MCNC: 8.8 MG/DL (ref 8.6–10.3)
CHLORIDE SERPL-SCNC: 104 MMOL/L (ref 98–107)
CO2 SERPL-SCNC: 25 MMOL/L (ref 21–32)
CREAT SERPL-MCNC: 0.7 MG/DL (ref 0.5–1.05)
EGFRCR SERPLBLD CKD-EPI 2021: >90 ML/MIN/1.73M*2
EOSINOPHIL # BLD AUTO: 0.06 X10*3/UL (ref 0–0.7)
EOSINOPHIL NFR BLD AUTO: 0.3 %
ERYTHROCYTE [DISTWIDTH] IN BLOOD BY AUTOMATED COUNT: 13.5 % (ref 11.5–14.5)
GLUCOSE SERPL-MCNC: 115 MG/DL (ref 74–99)
GRAM STN SPEC: ABNORMAL
GRAM STN SPEC: ABNORMAL
HCT VFR BLD AUTO: 34.6 % (ref 36–46)
HGB BLD-MCNC: 11.4 G/DL (ref 12–16)
IMM GRANULOCYTES # BLD AUTO: 0.21 X10*3/UL (ref 0–0.7)
IMM GRANULOCYTES NFR BLD AUTO: 0.9 % (ref 0–0.9)
LYMPHOCYTES # BLD AUTO: 2.32 X10*3/UL (ref 1.2–4.8)
LYMPHOCYTES NFR BLD AUTO: 10.4 %
MCH RBC QN AUTO: 30.2 PG (ref 26–34)
MCHC RBC AUTO-ENTMCNC: 32.9 G/DL (ref 32–36)
MCV RBC AUTO: 92 FL (ref 80–100)
MONOCYTES # BLD AUTO: 1.52 X10*3/UL (ref 0.1–1)
MONOCYTES NFR BLD AUTO: 6.8 %
NEUTROPHILS # BLD AUTO: 18.09 X10*3/UL (ref 1.2–7.7)
NEUTROPHILS NFR BLD AUTO: 81.3 %
NRBC BLD-RTO: 0 /100 WBCS (ref 0–0)
PLATELET # BLD AUTO: 197 X10*3/UL (ref 150–450)
POTASSIUM SERPL-SCNC: 3.4 MMOL/L (ref 3.5–5.3)
PROT SERPL-MCNC: 6.6 G/DL (ref 6.4–8.2)
RBC # BLD AUTO: 3.77 X10*6/UL (ref 4–5.2)
SODIUM SERPL-SCNC: 137 MMOL/L (ref 136–145)
WBC # BLD AUTO: 22.3 X10*3/UL (ref 4.4–11.3)

## 2025-04-20 PROCEDURE — 94667 MNPJ CHEST WALL 1ST: CPT

## 2025-04-20 PROCEDURE — 2500000001 HC RX 250 WO HCPCS SELF ADMINISTERED DRUGS (ALT 637 FOR MEDICARE OP): Performed by: HOSPITALIST

## 2025-04-20 PROCEDURE — 2500000001 HC RX 250 WO HCPCS SELF ADMINISTERED DRUGS (ALT 637 FOR MEDICARE OP): Performed by: INTERNAL MEDICINE

## 2025-04-20 PROCEDURE — 1200000002 HC GENERAL ROOM WITH TELEMETRY DAILY

## 2025-04-20 PROCEDURE — 80053 COMPREHEN METABOLIC PANEL: CPT

## 2025-04-20 PROCEDURE — 36415 COLL VENOUS BLD VENIPUNCTURE: CPT

## 2025-04-20 PROCEDURE — 2500000002 HC RX 250 W HCPCS SELF ADMINISTERED DRUGS (ALT 637 FOR MEDICARE OP, ALT 636 FOR OP/ED): Performed by: HOSPITALIST

## 2025-04-20 PROCEDURE — 2500000004 HC RX 250 GENERAL PHARMACY W/ HCPCS (ALT 636 FOR OP/ED): Mod: JZ

## 2025-04-20 PROCEDURE — 2500000004 HC RX 250 GENERAL PHARMACY W/ HCPCS (ALT 636 FOR OP/ED): Mod: JZ | Performed by: HOSPITALIST

## 2025-04-20 PROCEDURE — 85025 COMPLETE CBC W/AUTO DIFF WBC: CPT | Performed by: INTERNAL MEDICINE

## 2025-04-20 PROCEDURE — 99233 SBSQ HOSP IP/OBS HIGH 50: CPT | Performed by: HOSPITALIST

## 2025-04-20 PROCEDURE — 2500000004 HC RX 250 GENERAL PHARMACY W/ HCPCS (ALT 636 FOR OP/ED): Mod: JZ | Performed by: INTERNAL MEDICINE

## 2025-04-20 RX ORDER — AMOXICILLIN 250 MG
2 CAPSULE ORAL 2 TIMES DAILY
Status: DISCONTINUED | OUTPATIENT
Start: 2025-04-20 | End: 2025-04-25

## 2025-04-20 RX ORDER — PANTOPRAZOLE SODIUM 40 MG/1
40 TABLET, DELAYED RELEASE ORAL
Status: DISCONTINUED | OUTPATIENT
Start: 2025-04-20 | End: 2025-04-28 | Stop reason: HOSPADM

## 2025-04-20 RX ORDER — SUCRALFATE 1 G/10ML
1 SUSPENSION ORAL EVERY 6 HOURS SCHEDULED
Status: DISCONTINUED | OUTPATIENT
Start: 2025-04-20 | End: 2025-04-28 | Stop reason: HOSPADM

## 2025-04-20 RX ORDER — POTASSIUM CHLORIDE 20 MEQ/1
40 TABLET, EXTENDED RELEASE ORAL ONCE
Status: COMPLETED | OUTPATIENT
Start: 2025-04-20 | End: 2025-04-20

## 2025-04-20 RX ADMIN — METRONIDAZOLE 500 MG: 500 INJECTION, SOLUTION INTRAVENOUS at 12:00

## 2025-04-20 RX ADMIN — SENNOSIDES AND DOCUSATE SODIUM 2 TABLET: 50; 8.6 TABLET ORAL at 14:51

## 2025-04-20 RX ADMIN — MORPHINE SULFATE 1 MG: 2 INJECTION, SOLUTION INTRAMUSCULAR; INTRAVENOUS at 16:07

## 2025-04-20 RX ADMIN — MORPHINE SULFATE 1 MG: 2 INJECTION, SOLUTION INTRAMUSCULAR; INTRAVENOUS at 00:32

## 2025-04-20 RX ADMIN — SUCRALFATE ORAL SUSPENSION 1 G: 1 SUSPENSION ORAL at 17:41

## 2025-04-20 RX ADMIN — LOSARTAN POTASSIUM 50 MG: 50 TABLET, FILM COATED ORAL at 09:56

## 2025-04-20 RX ADMIN — GUAIFENESIN 1200 MG: 600 TABLET ORAL at 20:05

## 2025-04-20 RX ADMIN — SENNOSIDES AND DOCUSATE SODIUM 2 TABLET: 50; 8.6 TABLET ORAL at 20:05

## 2025-04-20 RX ADMIN — OXYCODONE HYDROCHLORIDE 5 MG: 5 TABLET ORAL at 10:26

## 2025-04-20 RX ADMIN — PANTOPRAZOLE SODIUM 40 MG: 40 TABLET, DELAYED RELEASE ORAL at 17:41

## 2025-04-20 RX ADMIN — PANTOPRAZOLE SODIUM 40 MG: 40 INJECTION, POWDER, FOR SOLUTION INTRAVENOUS at 06:11

## 2025-04-20 RX ADMIN — ATORVASTATIN CALCIUM 20 MG: 20 TABLET, FILM COATED ORAL at 09:56

## 2025-04-20 RX ADMIN — SUCRALFATE ORAL SUSPENSION 1 G: 1 SUSPENSION ORAL at 14:51

## 2025-04-20 RX ADMIN — CIPROFLOXACIN 400 MG: 400 INJECTION, SOLUTION INTRAVENOUS at 06:11

## 2025-04-20 RX ADMIN — GUAIFENESIN 1200 MG: 600 TABLET ORAL at 09:56

## 2025-04-20 RX ADMIN — PANTOPRAZOLE SODIUM 40 MG: 40 TABLET, DELAYED RELEASE ORAL at 10:26

## 2025-04-20 RX ADMIN — POTASSIUM CHLORIDE 40 MEQ: 1500 TABLET, EXTENDED RELEASE ORAL at 10:26

## 2025-04-20 RX ADMIN — MORPHINE SULFATE 1 MG: 2 INJECTION, SOLUTION INTRAMUSCULAR; INTRAVENOUS at 21:25

## 2025-04-20 RX ADMIN — DOCUSATE SODIUM 100 MG: 100 CAPSULE, LIQUID FILLED ORAL at 09:56

## 2025-04-20 RX ADMIN — MORPHINE SULFATE 1 MG: 2 INJECTION, SOLUTION INTRAMUSCULAR; INTRAVENOUS at 06:10

## 2025-04-20 RX ADMIN — METRONIDAZOLE 500 MG: 500 INJECTION, SOLUTION INTRAVENOUS at 00:32

## 2025-04-20 RX ADMIN — ENOXAPARIN SODIUM 40 MG: 40 INJECTION SUBCUTANEOUS at 10:25

## 2025-04-20 RX ADMIN — CIPROFLOXACIN 400 MG: 400 INJECTION, SOLUTION INTRAVENOUS at 19:41

## 2025-04-20 RX ADMIN — METRONIDAZOLE 500 MG: 500 INJECTION, SOLUTION INTRAVENOUS at 17:41

## 2025-04-20 ASSESSMENT — PAIN - FUNCTIONAL ASSESSMENT
PAIN_FUNCTIONAL_ASSESSMENT: 0-10

## 2025-04-20 ASSESSMENT — COGNITIVE AND FUNCTIONAL STATUS - GENERAL
MOBILITY SCORE: 24
DAILY ACTIVITIY SCORE: 24

## 2025-04-20 ASSESSMENT — PAIN SCALES - GENERAL
PAINLEVEL_OUTOF10: 0 - NO PAIN
PAINLEVEL_OUTOF10: 7
PAINLEVEL_OUTOF10: 9
PAINLEVEL_OUTOF10: 4
PAINLEVEL_OUTOF10: 4
PAINLEVEL_OUTOF10: 9
PAINLEVEL_OUTOF10: 8
PAINLEVEL_OUTOF10: 8

## 2025-04-20 ASSESSMENT — PAIN DESCRIPTION - LOCATION
LOCATION: ABDOMEN

## 2025-04-20 ASSESSMENT — PAIN DESCRIPTION - DESCRIPTORS: DESCRIPTORS: ACHING

## 2025-04-20 NOTE — PROGRESS NOTES
Between 7AM-7PM please message me via Epic Secure Chat.  After 7PM please page Nocturnist on call.    [facility] Hospitalist Progress Note      Yuni Jordan    :  1970(54 y.o.)    MRN:  [id number]  Date: 25     Assessment and Plan:     Acute Cholecystitis  - Surgery consulted. Lap daria on  with Dr. Walsh  - Continue IV abx. Trend CBC, if WBC rises will get repeat CT AP  - continue pain control, prn zofran    Gastrojejunostomy ucler  - Seen on EGD 25. PPI BID. Add carafate qid  - op follow up with GI for repeat EGD around 6/15/25    HTN  - continue losartan    HLD  - on statin    DVT Prophylaxis: subcutaneous Lovenox    Disposition: awaiting improvement in abd pain    Subjective:      Interval History:   Vitals and chart notes from overnight reviewed.   No acute issues overnight.   Patient seen and evaluated at bedside.   Still with epigastric/ruq pain. Radiates to back. Requiring po and iv prns.     Review of Systems:   Other than patient's chronic conditions and those complaints in the history above, the rest of the 10 systems review were done and were negative.     Current medications:  Scheduled Meds:Scheduled Medications[1]  Continuous Infusions:Continuous Medications[2]  PRN Meds:PRN Medications[3]      Objective:     Heart Rate:  []   Temp:  [36.2 °C (97.2 °F)-37.6 °C (99.7 °F)]   Resp:  [16-18]   BP: (129-155)/(61-90)   SpO2:  [94 %-99 %]     Oxygen Dose: *4 L/min    Physical Exam  Vitals and nursing note reviewed.   HENT:      Mouth/Throat:      Mouth: Mucous membranes are moist.      Pharynx: Oropharynx is clear.   Cardiovascular:      Rate and Rhythm: Normal rate and regular rhythm.   Pulmonary:      Effort: Pulmonary effort is normal.   Abdominal:      General: There is no distension.      Palpations: Abdomen is soft.      Tenderness: There is abdominal tenderness.   Neurological:      Mental Status: She is alert and oriented to person, place, and time.          Labs:   Lab Results   Component Value Date     04/20/2025    K 3.4 (L) 04/20/2025     04/20/2025    CO2 25 04/20/2025    BUN 15 04/20/2025    CREATININE 0.70 04/20/2025    GLUCOSE 115 (H) 04/20/2025    CALCIUM 8.8 04/20/2025    PROT 6.6 04/20/2025    BILITOT 0.5 04/20/2025    ALKPHOS 91 04/20/2025    AST 32 04/20/2025    ALT 30 04/20/2025       Lab Results   Component Value Date    WBC 22.3 (H) 04/20/2025    HGB 11.4 (L) 04/20/2025    HCT 34.6 (L) 04/20/2025    MCV 92 04/20/2025     04/20/2025          [1] atorvastatin, 20 mg, oral, Daily  ciprofloxacin, 400 mg, intravenous, q12h  enoxaparin, 40 mg, subcutaneous, q24h  guaiFENesin, 1,200 mg, oral, BID  losartan, 50 mg, oral, Daily  metroNIDAZOLE, 500 mg, intravenous, q8h  pantoprazole, 40 mg, oral, BID AC  sennosides-docusate sodium, 2 tablet, oral, BID  sucralfate, 1 g, oral, q6h JAMEL    [2]    [3] PRN medications: acetaminophen **OR** acetaminophen **OR** acetaminophen, albuterol, morphine, ondansetron **OR** ondansetron, oxyCODONE, polyethylene glycol

## 2025-04-20 NOTE — PROGRESS NOTES
"Yuni Jordan is a 54 y.o. female on day 1 of admission presenting with Acute cholecystitis.    Subjective   Pain similar to yesterday, awaiting IV access       Objective     Physical Exam  Constitutional:       Appearance: Normal appearance.   HENT:      Head: Normocephalic.   Cardiovascular:      Rate and Rhythm: Normal rate and regular rhythm.      Pulses: Normal pulses.   Pulmonary:      Effort: Pulmonary effort is normal.   Abdominal:      General: Bowel sounds are normal.      Palpations: Abdomen is soft.      Comments: Incisions clean dry, tenderness to palpation   Musculoskeletal:         General: Normal range of motion.   Skin:     General: Skin is warm.   Neurological:      General: No focal deficit present.      Mental Status: She is alert.   Psychiatric:         Mood and Affect: Mood normal.         Behavior: Behavior normal.         Last Recorded Vitals  Blood pressure 146/78, pulse 105, temperature 36.5 °C (97.7 °F), temperature source Temporal, resp. rate 17, height 1.575 m (5' 2\"), weight 89.8 kg (197 lb 15.6 oz), SpO2 99%.  Intake/Output last 3 Shifts:  I/O last 3 completed shifts:  In: 2400 (26.7 mL/kg) [I.V.:1600 (17.8 mL/kg); IV Piggyback:800]  Out: 10 (0.1 mL/kg) [Blood:10]  Weight: 89.8 kg     Relevant Results                              Assessment & Plan  Acute cholecystitis    Cholecystitis    Status post cholecystectomy  Ongoing leukocytosis, continue IV antibiotics, may require reimaging if persistent  Still having symptoms from gastrojejunal ulcer   diet as tolerated      I spent 10 minutes in the professional and overall care of this patient.      Ang Muhammad MD    "

## 2025-04-20 NOTE — CARE PLAN
The patient's goals for the shift include      The clinical goals for the shift include Maintain adequate pain control throughout shift

## 2025-04-21 ENCOUNTER — APPOINTMENT (OUTPATIENT)
Dept: RADIOLOGY | Facility: HOSPITAL | Age: 55
DRG: 417 | End: 2025-04-21
Payer: COMMERCIAL

## 2025-04-21 ENCOUNTER — APPOINTMENT (OUTPATIENT)
Facility: CLINIC | Age: 55
End: 2025-04-21
Payer: COMMERCIAL

## 2025-04-21 LAB
ALBUMIN SERPL BCP-MCNC: 3.2 G/DL (ref 3.4–5)
ALP SERPL-CCNC: 90 U/L (ref 33–110)
ALT SERPL W P-5'-P-CCNC: 25 U/L (ref 7–45)
ANION GAP SERPL CALC-SCNC: 12 MMOL/L (ref 10–20)
AST SERPL W P-5'-P-CCNC: 21 U/L (ref 9–39)
ATRIAL RATE: 103 BPM
BASOPHILS # BLD AUTO: 0.06 X10*3/UL (ref 0–0.1)
BASOPHILS NFR BLD AUTO: 0.4 %
BILIRUB SERPL-MCNC: 0.5 MG/DL (ref 0–1.2)
BUN SERPL-MCNC: 13 MG/DL (ref 6–23)
CALCIUM SERPL-MCNC: 8.5 MG/DL (ref 8.6–10.3)
CHLORIDE SERPL-SCNC: 104 MMOL/L (ref 98–107)
CO2 SERPL-SCNC: 25 MMOL/L (ref 21–32)
CREAT SERPL-MCNC: 0.67 MG/DL (ref 0.5–1.05)
EGFRCR SERPLBLD CKD-EPI 2021: >90 ML/MIN/1.73M*2
EOSINOPHIL # BLD AUTO: 0.28 X10*3/UL (ref 0–0.7)
EOSINOPHIL NFR BLD AUTO: 1.8 %
ERYTHROCYTE [DISTWIDTH] IN BLOOD BY AUTOMATED COUNT: 13.5 % (ref 11.5–14.5)
GLUCOSE SERPL-MCNC: 116 MG/DL (ref 74–99)
HCT VFR BLD AUTO: 37.4 % (ref 36–46)
HGB BLD-MCNC: 11.9 G/DL (ref 12–16)
IMM GRANULOCYTES # BLD AUTO: 0.08 X10*3/UL (ref 0–0.7)
IMM GRANULOCYTES NFR BLD AUTO: 0.5 % (ref 0–0.9)
LYMPHOCYTES # BLD AUTO: 2.88 X10*3/UL (ref 1.2–4.8)
LYMPHOCYTES NFR BLD AUTO: 18.2 %
MCH RBC QN AUTO: 29.3 PG (ref 26–34)
MCHC RBC AUTO-ENTMCNC: 31.8 G/DL (ref 32–36)
MCV RBC AUTO: 92 FL (ref 80–100)
MONOCYTES # BLD AUTO: 1.28 X10*3/UL (ref 0.1–1)
MONOCYTES NFR BLD AUTO: 8.1 %
NEUTROPHILS # BLD AUTO: 11.23 X10*3/UL (ref 1.2–7.7)
NEUTROPHILS NFR BLD AUTO: 71 %
NRBC BLD-RTO: 0 /100 WBCS (ref 0–0)
P AXIS: 67 DEGREES
P OFFSET: 193 MS
P ONSET: 143 MS
PLATELET # BLD AUTO: 215 X10*3/UL (ref 150–450)
POTASSIUM SERPL-SCNC: 3.7 MMOL/L (ref 3.5–5.3)
PR INTERVAL: 144 MS
PROT SERPL-MCNC: 6.3 G/DL (ref 6.4–8.2)
Q ONSET: 215 MS
QRS COUNT: 17 BEATS
QRS DURATION: 90 MS
QT INTERVAL: 346 MS
QTC CALCULATION(BAZETT): 453 MS
QTC FREDERICIA: 414 MS
R AXIS: 34 DEGREES
RBC # BLD AUTO: 4.06 X10*6/UL (ref 4–5.2)
SODIUM SERPL-SCNC: 137 MMOL/L (ref 136–145)
T AXIS: 21 DEGREES
T OFFSET: 388 MS
VENTRICULAR RATE: 103 BPM
WBC # BLD AUTO: 15.8 X10*3/UL (ref 4.4–11.3)

## 2025-04-21 PROCEDURE — 2500000004 HC RX 250 GENERAL PHARMACY W/ HCPCS (ALT 636 FOR OP/ED): Mod: JZ | Performed by: INTERNAL MEDICINE

## 2025-04-21 PROCEDURE — 74176 CT ABD & PELVIS W/O CONTRAST: CPT | Performed by: RADIOLOGY

## 2025-04-21 PROCEDURE — 74176 CT ABD & PELVIS W/O CONTRAST: CPT

## 2025-04-21 PROCEDURE — 94668 MNPJ CHEST WALL SBSQ: CPT

## 2025-04-21 PROCEDURE — 2500000001 HC RX 250 WO HCPCS SELF ADMINISTERED DRUGS (ALT 637 FOR MEDICARE OP): Performed by: INTERNAL MEDICINE

## 2025-04-21 PROCEDURE — 36415 COLL VENOUS BLD VENIPUNCTURE: CPT | Performed by: INTERNAL MEDICINE

## 2025-04-21 PROCEDURE — 99232 SBSQ HOSP IP/OBS MODERATE 35: CPT

## 2025-04-21 PROCEDURE — 85025 COMPLETE CBC W/AUTO DIFF WBC: CPT | Performed by: INTERNAL MEDICINE

## 2025-04-21 PROCEDURE — 2500000004 HC RX 250 GENERAL PHARMACY W/ HCPCS (ALT 636 FOR OP/ED): Mod: JZ

## 2025-04-21 PROCEDURE — 99233 SBSQ HOSP IP/OBS HIGH 50: CPT | Performed by: INTERNAL MEDICINE

## 2025-04-21 PROCEDURE — 2500000004 HC RX 250 GENERAL PHARMACY W/ HCPCS (ALT 636 FOR OP/ED): Performed by: HOSPITALIST

## 2025-04-21 PROCEDURE — 1200000002 HC GENERAL ROOM WITH TELEMETRY DAILY

## 2025-04-21 PROCEDURE — 84075 ASSAY ALKALINE PHOSPHATASE: CPT

## 2025-04-21 PROCEDURE — 2500000001 HC RX 250 WO HCPCS SELF ADMINISTERED DRUGS (ALT 637 FOR MEDICARE OP): Performed by: HOSPITALIST

## 2025-04-21 PROCEDURE — 2500000002 HC RX 250 W HCPCS SELF ADMINISTERED DRUGS (ALT 637 FOR MEDICARE OP, ALT 636 FOR OP/ED): Performed by: HOSPITALIST

## 2025-04-21 RX ADMIN — CIPROFLOXACIN 400 MG: 400 INJECTION, SOLUTION INTRAVENOUS at 06:07

## 2025-04-21 RX ADMIN — PANTOPRAZOLE SODIUM 40 MG: 40 TABLET, DELAYED RELEASE ORAL at 15:03

## 2025-04-21 RX ADMIN — METRONIDAZOLE 500 MG: 500 INJECTION, SOLUTION INTRAVENOUS at 00:14

## 2025-04-21 RX ADMIN — SENNOSIDES AND DOCUSATE SODIUM 2 TABLET: 50; 8.6 TABLET ORAL at 20:17

## 2025-04-21 RX ADMIN — GUAIFENESIN 1200 MG: 600 TABLET ORAL at 20:16

## 2025-04-21 RX ADMIN — PANTOPRAZOLE SODIUM 40 MG: 40 TABLET, DELAYED RELEASE ORAL at 06:07

## 2025-04-21 RX ADMIN — OXYCODONE HYDROCHLORIDE 5 MG: 5 TABLET ORAL at 15:03

## 2025-04-21 RX ADMIN — ENOXAPARIN SODIUM 40 MG: 40 INJECTION SUBCUTANEOUS at 10:01

## 2025-04-21 RX ADMIN — ATORVASTATIN CALCIUM 20 MG: 20 TABLET, FILM COATED ORAL at 09:47

## 2025-04-21 RX ADMIN — MORPHINE SULFATE 1 MG: 2 INJECTION, SOLUTION INTRAMUSCULAR; INTRAVENOUS at 03:24

## 2025-04-21 RX ADMIN — SUCRALFATE ORAL SUSPENSION 1 G: 1 SUSPENSION ORAL at 00:14

## 2025-04-21 RX ADMIN — SENNOSIDES AND DOCUSATE SODIUM 2 TABLET: 50; 8.6 TABLET ORAL at 09:47

## 2025-04-21 RX ADMIN — GUAIFENESIN 1200 MG: 600 TABLET ORAL at 09:47

## 2025-04-21 RX ADMIN — SUCRALFATE ORAL SUSPENSION 1 G: 1 SUSPENSION ORAL at 17:53

## 2025-04-21 RX ADMIN — METRONIDAZOLE 500 MG: 500 INJECTION, SOLUTION INTRAVENOUS at 10:02

## 2025-04-21 RX ADMIN — SUCRALFATE ORAL SUSPENSION 1 G: 1 SUSPENSION ORAL at 12:22

## 2025-04-21 RX ADMIN — POLYETHYLENE GLYCOL 3350 17 G: 17 POWDER, FOR SOLUTION ORAL at 20:16

## 2025-04-21 RX ADMIN — OXYCODONE HYDROCHLORIDE 5 MG: 5 TABLET ORAL at 09:46

## 2025-04-21 RX ADMIN — LOSARTAN POTASSIUM 50 MG: 50 TABLET, FILM COATED ORAL at 09:47

## 2025-04-21 RX ADMIN — SUCRALFATE ORAL SUSPENSION 1 G: 1 SUSPENSION ORAL at 06:07

## 2025-04-21 RX ADMIN — CIPROFLOXACIN 400 MG: 400 INJECTION, SOLUTION INTRAVENOUS at 19:02

## 2025-04-21 RX ADMIN — METRONIDAZOLE 500 MG: 500 INJECTION, SOLUTION INTRAVENOUS at 17:46

## 2025-04-21 RX ADMIN — OXYCODONE HYDROCHLORIDE 5 MG: 5 TABLET ORAL at 19:28

## 2025-04-21 ASSESSMENT — COGNITIVE AND FUNCTIONAL STATUS - GENERAL
MOBILITY SCORE: 24
DAILY ACTIVITIY SCORE: 24
DAILY ACTIVITIY SCORE: 24
MOBILITY SCORE: 24

## 2025-04-21 ASSESSMENT — PAIN DESCRIPTION - LOCATION
LOCATION: ABDOMEN

## 2025-04-21 ASSESSMENT — PAIN SCALES - GENERAL
PAINLEVEL_OUTOF10: 8
PAINLEVEL_OUTOF10: 4
PAINLEVEL_OUTOF10: 8
PAINLEVEL_OUTOF10: 9
PAINLEVEL_OUTOF10: 8
PAINLEVEL_OUTOF10: 4

## 2025-04-21 ASSESSMENT — PAIN DESCRIPTION - ORIENTATION: ORIENTATION: UPPER;LOWER;MID

## 2025-04-21 ASSESSMENT — PAIN - FUNCTIONAL ASSESSMENT
PAIN_FUNCTIONAL_ASSESSMENT: 0-10

## 2025-04-21 NOTE — CARE PLAN
The patient's goals for the shift include want to be free of pain.    The clinical goals for the shift include for pt to be free from falls and pain.      Problem: Pain - Adult  Goal: Verbalizes/displays adequate comfort level or baseline comfort level  Outcome: Progressing     Problem: Pain  Goal: Takes deep breaths with improved pain control throughout the shift  Outcome: Progressing

## 2025-04-21 NOTE — CARE PLAN
The patient's goals for the shift include      The clinical goals for the shift include remain hds and decrease pain    Problem: Pain - Adult  Goal: Verbalizes/displays adequate comfort level or baseline comfort level  Outcome: Progressing     Problem: Safety - Adult  Goal: Free from fall injury  Outcome: Progressing     Problem: Discharge Planning  Goal: Discharge to home or other facility with appropriate resources  Outcome: Progressing     Problem: Chronic Conditions and Co-morbidities  Goal: Patient's chronic conditions and co-morbidity symptoms are monitored and maintained or improved  Outcome: Progressing     Problem: Nutrition  Goal: Nutrient intake appropriate for maintaining nutritional needs  Outcome: Progressing     Problem: Diabetes  Goal: Achieve decreasing blood glucose levels by end of shift  Outcome: Progressing  Goal: Increase stability of blood glucose readings by end of shift  Outcome: Progressing  Goal: Decrease in ketones present in urine by end of shift  Outcome: Progressing  Goal: Maintain electrolyte levels within acceptable range throughout shift  Outcome: Progressing  Goal: Maintain glucose levels >70mg/dl to <250mg/dl throughout shift  Outcome: Progressing  Goal: No changes in neurological exam by end of shift  Outcome: Progressing  Goal: Learn about and adhere to nutrition recommendations by end of shift  Outcome: Progressing  Goal: Vital signs within normal range for age by end of shift  Outcome: Progressing  Goal: Increase self care and/or family involovement by end of shift  Outcome: Progressing  Goal: Receive DSME education by end of shift  Outcome: Progressing     Problem: Pain  Goal: Takes deep breaths with improved pain control throughout the shift  Outcome: Progressing  Goal: Turns in bed with improved pain control throughout the shift  Outcome: Progressing  Goal: Walks with improved pain control throughout the shift  Outcome: Progressing  Goal: Performs ADL's with improved pain  control throughout shift  Outcome: Progressing  Goal: Participates in PT with improved pain control throughout the shift  Outcome: Progressing  Goal: Free from opioid side effects throughout the shift  Outcome: Progressing  Goal: Free from acute confusion related to pain meds throughout the shift  Outcome: Progressing

## 2025-04-21 NOTE — CONSULTS
Nutrition Consult Note  Nutrition Assessment      Reason for Assessment: Admission nursing screening    Yuni Jordan is a 54 y.o. year old female patient with Acute cholecystitis [K81.0]  Cholecystitis [K81.9]    referred for MST-2 wt loss and poor appetite   .   Chart reviewed and pt visited.  Medical History[1]    Per chart review:  -HO Nadja-en-Y surgery   -Recent hospitalization for N/V/D  -Pt admitted c/o chest pains, SOB, abd discomfort/pain and nausea  -EGD shows ulcer and hiatal hernia; repeat EGD June 2025  -Cholecystectomy 4/18    Pt being wheeled away for procedure as RD coming to room.    Scheduled medications  Scheduled Medications[2]  Continuous medications  Continuous Medications[3]  PRN medications  PRN Medications[4]    Nutrition Significant Labs:  BMP Trend:   Results from last 7 days   Lab Units 04/21/25  0649 04/20/25  0522 04/19/25  0522 04/18/25  1439   GLUCOSE mg/dL 116* 115* 145* 167*   CALCIUM mg/dL 8.5* 8.8 9.0 9.4   SODIUM mmol/L 137 137 137 138   POTASSIUM mmol/L 3.7 3.4* 3.8 3.9   CO2 mmol/L 25 25 22 21   CHLORIDE mmol/L 104 104 104 104   BUN mg/dL 13 15 16 13   CREATININE mg/dL 0.67 0.70 0.74 0.70    , BG POCT trend:   Results from last 7 days   Lab Units 04/18/25  2354 04/18/25  1950 04/18/25  1804 04/16/25  1122 04/16/25  1020   POCT GLUCOSE mg/dL 220* 193* 179* 77 81    , Liver Function Trend:   Results from last 7 days   Lab Units 04/21/25  0649 04/20/25  0522 04/18/25  1439 04/15/25  1533   ALK PHOS U/L 90 91 114* 124*   AST U/L 21 32 16 15   ALT U/L 25 30 14 19   BILIRUBIN TOTAL mg/dL 0.5 0.5 0.7 0.6    , Renal Lab Trend:   Results from last 7 days   Lab Units 04/21/25  0649 04/20/25  0522 04/19/25  0522 04/18/25  1439   POTASSIUM mmol/L 3.7 3.4* 3.8 3.9   SODIUM mmol/L 137 137 137 138   EGFR mL/min/1.73m*2 >90 >90 >90 >90   BUN mg/dL 13 15 16 13   CREATININE mg/dL 0.67 0.70 0.74 0.70    , TPN/PPN Labs:   Results from last 7 days   Lab Units 04/21/25  0649 04/20/25  0522  "04/19/25  0522 04/18/25  1439   GLUCOSE mg/dL 116* 115* 145* 167*   POTASSIUM mmol/L 3.7 3.4* 3.8 3.9   SODIUM mmol/L 137 137 137 138   CHLORIDE mmol/L 104 104 104 104   ALT U/L 25 30  --  14   AST U/L 21 32  --  16   ALK PHOS U/L 90 91  --  114*   BILIRUBIN TOTAL mg/dL 0.5 0.5  --  0.7    , Lipid Panel:   Lab Results   Component Value Date    CHOL 177 02/11/2023    HDL 49.5 02/11/2023    CHHDL 3.6 02/11/2023    LDLF 109 (H) 02/11/2023    VLDL 19 02/11/2023    TRIG 93 02/11/2023    , Vit D:   Lab Results   Component Value Date    VITD25 15 (A) 02/11/2023    , Vit B12:   Lab Results   Component Value Date    MCWKCRAK25 1,459 (H) 02/11/2023    , Iron Panel:   Lab Results   Component Value Date    IRON 66 03/17/2021    TIBC 403 03/17/2021    FERRITIN 66 03/17/2021      Dietary Orders (From admission, onward)       Start     Ordered    04/21/25 1341  Oral nutritional supplements  Until discontinued        Question Answer Comment   Deliver with Breakfast    Deliver with Dinner    Select supplement: Ensure Clear        04/21/25 1341    04/19/25 1145  Adult diet Regular, Fat restricted 40 gm  Diet effective now        Comments: low fat diet   Question Answer Comment   Diet type Regular    Diet type Fat restricted 40 gm        04/19/25 1144    04/18/25 2330  May Participate in Room Service  ( ROOM SERVICE MAY PARTICIPATE)  Once        Question:  .  Answer:  Yes    04/18/25 2329                  History:  Energy Intake: Poor < 50 %  Food and Nutrient History: Suspected poor oral intake- chart review states poor appetite  Food Allergy: Peanut, Tree nuts, Shellfish    Anthropometrics:  Height: 157.5 cm (5' 2.01\")  Weight: 89.8 kg (197 lb 15.6 oz)  BMI (Calculated): 36.2    Weight Change: 0    Wt Readings from Last 5 Encounters:  04/21/25 89.8 kg (197 lb 15.6 oz)  04/15/25 91.6 kg (202 lb)  04/15/25 95.7 kg (211 lb)  03/25/25 95.8 kg (211 lb 4.8 oz)- office wt  01/02/24 88.5 kg (195 lb 1.7 oz)    Significant Weight Loss: " "Yes  Interpretation of Weight Loss: >2% in 1 week       IBW/kg (Dietitian Calculated): 50 kg  Percent of IBW: 180 %     Energy Needs:  Height: 157.5 cm (5' 2.01\")  Temp: 36.6 °C (97.9 °F)    Total Energy Estimated Needs in 24 hours (kCal): 1800 kCal  Energy Estimated Needs per kg Body Weight in 24 hours (kCal/kg): 1975 kCal/kg  Method for Estimating Needs: 20-22kcal/kg    Total Protein Estimated Needs in 24 Hours (g): 75 g  Protein Estimated Needs per kg Body Weight in 24 Hours (g/kg): 100 g/kg  Method for Estimating 24 Hour Protein Needs: 1.5-2.0g/kg (IBW)    Method for Estimating 24 Hour Fluid Needs: 1mL/kcal or MD recommendations       Nutrition Focused Physical Findings:  Defer Subcutaneous Fat Loss Assessment: Defer all  Defer All Reason: Pt being wheeled away to procedure, unable to make assessment visually    Defer Muscle Wasting Assessment: Defer all  Defer All Reason: Pt being wheeled away to procedure, unable to make assessment visually    Edema: none       Skin: Positive (Surgical wound- abd)  Positive Skin Findings: Impaired wound healing  Digestive System Findings: Abdominal pain, Nausea       Nutrition Diagnosis        Patient has Nutrition Diagnosis: Yes  Nutrition Diagnosis 1: Unintended weight loss  Diagnosis Status (1): New  Related to (1): acute condition  As Evidenced by (1): greater than 5% weight loss in 1 month       Nutrition Diagnosis 2: Predicted inadequate energy intake  Diagnosis Status (2): New  Related to (2): acute condition  As Evidenced by (2): chart review reports poor appetite     Nutrition Interventions/Recommendations   Nutrition Prescription: Nutrition prescription for oral nutrition  Individualized Nutrition Prescription Provided for : Ensure clear ONS    Food and/or Nutrient Delivery Interventions  Meals and Snacks: General healthful diet, Fat-modified diet     Medical Food Supplement: Commercial beverage medical food supplement therapy  Goal: Ensure clear nutritional " supplements provide 240kcals and 8g of protein per serving       Nutrition Monitoring and Evaluation   Food and Nutrient Related History  Estimated Energy Intake: Energy intake greater or equal to 75% of estimated energy needs    Fluid Intake: Estimated fluid intake    Intake / Amount of food: Meets > 75% estimated energy needs, Consumes at least 75% or more of meals/snacks/supplements    Anthropometrics: Body Composition/Growth/Weight History  Body Weight: Body weight - Maintain stable weight    Body Weight Change: Body weight loss - Gradual weight loss    Biochemical Data, Medical Tests and Procedures  Electrolyte and Renal Panel: Other (Comment), Calcium, serum, Calcium, ionized  Criteria: As clinically indicated    Gastrointestinal Profile: Other (Comment)  Criteria: As clinically indicated    Glucose/Endocrine Profile: Glucose within normal limits ( mg/dL)  Criteria: As clinically indicated    Nutritional Anemia Profile: Other (Comment)  Criteria: As clinically indicated    Vitamin Profile: Other (Comment)  Criteria: As clinically indicated    Nutrition Focused Physical Findings     Digestive System Finding: Abdominal pain, Nausea    Time Spent (min): 45 minutes  Last Date of Nutrition Visit: 04/21/25  Nutrition Follow-Up Needed?: Dietitian to reassess per policy  Follow up Comment: THADDEUS Redd            [1]   Past Medical History:  Diagnosis Date    Asthma     Diabetes mellitus (Multi)     patient states she used to be but she guesses so    Eczema 1971    GERD (gastroesophageal reflux disease) 2012    ARTUR (obstructive sleep apnea)     patient denies    Peptic ulceration 2015   [2] atorvastatin, 20 mg, oral, Daily  ciprofloxacin, 400 mg, intravenous, q12h  enoxaparin, 40 mg, subcutaneous, q24h  guaiFENesin, 1,200 mg, oral, BID  losartan, 50 mg, oral, Daily  metroNIDAZOLE, 500 mg, intravenous, q8h  pantoprazole, 40 mg, oral, BID AC  sennosides-docusate sodium, 2 tablet, oral, BID  sucralfate, 1 g, oral,  q6h Counts include 234 beds at the [facility]     [3]    [4] PRN medications: acetaminophen **OR** acetaminophen **OR** acetaminophen, albuterol, morphine, ondansetron **OR** ondansetron, oxyCODONE, polyethylene glycol

## 2025-04-21 NOTE — PROGRESS NOTES
04/21/25 0830   Discharge Planning   Home or Post Acute Services None   Expected Discharge Disposition Home   Does the patient need discharge transport arranged? No     Patient had lap daria on 4/18/25.  Possible discharge today if abdominal pain is better.  WBC is trending down (15.8 today).   Plan remains for patient to return home upon discharge.  No home going needs identified at this time.  Will continue to follow for discharge planning needs.     Name band;

## 2025-04-21 NOTE — PROGRESS NOTES
"Yuni Jordan is a 54 y.o. female on day 2 of admission presenting with Acute cholecystitis.    Subjective   Having diffuse abdominal pain, white count has improved to 15,000, surgery is ordering a stat CT abdomen pelvis. No nausea/vomiting       Objective     Physical Exam  Vitals reviewed.   Constitutional:       Appearance: Normal appearance.   HENT:      Head: Normocephalic.      Right Ear: Tympanic membrane normal.      Nose: Nose normal.      Mouth/Throat:      Mouth: Mucous membranes are moist.   Cardiovascular:      Rate and Rhythm: Normal rate and regular rhythm.   Pulmonary:      Effort: Pulmonary effort is normal.   Abdominal:      General: Abdomen is flat.      Palpations: Abdomen is soft.      Comments: Hypoactive bs   Skin:     Capillary Refill: Capillary refill takes less than 2 seconds.   Neurological:      General: No focal deficit present.      Mental Status: She is alert.         Last Recorded Vitals  Blood pressure 135/71, pulse 80, temperature 36.9 °C (98.4 °F), temperature source Temporal, resp. rate 18, height 1.575 m (5' 2\"), weight 89.8 kg (197 lb 15.6 oz), SpO2 93%.  Intake/Output last 3 Shifts:  I/O last 3 completed shifts:  In: 900 (10 mL/kg) [IV Piggyback:900]  Out: - (0 mL/kg)   Weight: 89.8 kg     Relevant Results  Results for orders placed or performed during the hospital encounter of 04/18/25 (from the past 24 hours)   CBC and Auto Differential   Result Value Ref Range    WBC 15.8 (H) 4.4 - 11.3 x10*3/uL    nRBC 0.0 0.0 - 0.0 /100 WBCs    RBC 4.06 4.00 - 5.20 x10*6/uL    Hemoglobin 11.9 (L) 12.0 - 16.0 g/dL    Hematocrit 37.4 36.0 - 46.0 %    MCV 92 80 - 100 fL    MCH 29.3 26.0 - 34.0 pg    MCHC 31.8 (L) 32.0 - 36.0 g/dL    RDW 13.5 11.5 - 14.5 %    Platelets 215 150 - 450 x10*3/uL    Neutrophils % 71.0 40.0 - 80.0 %    Immature Granulocytes %, Automated 0.5 0.0 - 0.9 %    Lymphocytes % 18.2 13.0 - 44.0 %    Monocytes % 8.1 2.0 - 10.0 %    Eosinophils % 1.8 0.0 - 6.0 %    " Basophils % 0.4 0.0 - 2.0 %    Neutrophils Absolute 11.23 (H) 1.20 - 7.70 x10*3/uL    Immature Granulocytes Absolute, Automated 0.08 0.00 - 0.70 x10*3/uL    Lymphocytes Absolute 2.88 1.20 - 4.80 x10*3/uL    Monocytes Absolute 1.28 (H) 0.10 - 1.00 x10*3/uL    Eosinophils Absolute 0.28 0.00 - 0.70 x10*3/uL    Basophils Absolute 0.06 0.00 - 0.10 x10*3/uL   Comprehensive Metabolic Panel   Result Value Ref Range    Glucose 116 (H) 74 - 99 mg/dL    Sodium 137 136 - 145 mmol/L    Potassium 3.7 3.5 - 5.3 mmol/L    Chloride 104 98 - 107 mmol/L    Bicarbonate 25 21 - 32 mmol/L    Anion Gap 12 10 - 20 mmol/L    Urea Nitrogen 13 6 - 23 mg/dL    Creatinine 0.67 0.50 - 1.05 mg/dL    eGFR >90 >60 mL/min/1.73m*2    Calcium 8.5 (L) 8.6 - 10.3 mg/dL    Albumin 3.2 (L) 3.4 - 5.0 g/dL    Alkaline Phosphatase 90 33 - 110 U/L    Total Protein 6.3 (L) 6.4 - 8.2 g/dL    AST 21 9 - 39 U/L    Bilirubin, Total 0.5 0.0 - 1.2 mg/dL    ALT 25 7 - 45 U/L       Imaging Results  Ct abd/pelvis:  IMPRESSION:  Severe acute cholecystitis with gallbladder dilatation, wall  thickening, pericholecystic fluid and gallstones. Hyperdense stone  seen in the cystic duct. Surgical consultation recommended.    Medications:  Scheduled Medications[1]   PRN Medications[2]     Assessment/Plan   Acute Cholecystitis  - Surgery consulted. Lap daria on 4/18 with Dr. Walsh  - Continue IV abx. Cbc trending down, continued abd pain, surgery ordered stat ct:abd/pelvis  - continue pain control, prn zofran     2. Gastrojejunostomy ucler  - Seen on EGD 4/16/25. PPI BID. Added carafate qid  - op follow up with GI for repeat EGD around 6/15/25     3. HTN  - continue losartan     4. HLD  - on statin      DVT Prophylaxis:  Lovenox subq          Radha Rodirguez MD  [facility]           [1] atorvastatin, 20 mg, oral, Daily  ciprofloxacin, 400 mg, intravenous, q12h  enoxaparin, 40 mg, subcutaneous, q24h  guaiFENesin, 1,200 mg, oral, BID  losartan, 50 mg, oral,  Daily  metroNIDAZOLE, 500 mg, intravenous, q8h  pantoprazole, 40 mg, oral, BID AC  sennosides-docusate sodium, 2 tablet, oral, BID  sucralfate, 1 g, oral, q6h JAMEL    [2] PRN medications: acetaminophen **OR** acetaminophen **OR** acetaminophen, albuterol, morphine, ondansetron **OR** ondansetron, oxyCODONE, polyethylene glycol

## 2025-04-21 NOTE — PROGRESS NOTES
"Yuni Jordan is a 54 y.o. female on day 2 of admission presenting with Acute cholecystitis.    Subjective   Patient still with a fair amount of diffuse abdominal pains post-operatively despite improving leukocytosis. Does not have any further complaints.    Objective   PE:  Constitutional: A&Ox3, calm and cooperative, NAD  Eyes: PERRL, clear sclera   Head/Neck: Neck supple  Cardiovascular: Normal rate and regular rhythm.  Respiratory/Thorax: Good symmetric chest expansion. No labored breathing.  Gastrointestinal: Abdomen slightly distended, soft, diffuse tenderness to light palpation  Genitourinary: Voiding independently   Extremities: No peripheral edema  Neurological: A&Ox3, No focal deficits  Psychological: Appropriate mood and behavior  Skin: Warm and dry    Last Recorded Vitals  Blood pressure 135/71, pulse 80, temperature 36.9 °C (98.4 °F), temperature source Temporal, resp. rate 18, height 1.575 m (5' 2\"), weight 89.8 kg (197 lb 15.6 oz), SpO2 93%.  Intake/Output last 3 Shifts:  I/O last 3 completed shifts:  In: 900 (10 mL/kg) [IV Piggyback:900]  Out: - (0 mL/kg)   Weight: 89.8 kg     Relevant Results  Scheduled medications  Scheduled Medications[1]  Continuous medications  Continuous Medications[2]  PRN medications  PRN Medications[3]    Results for orders placed or performed during the hospital encounter of 04/18/25 (from the past 24 hours)   CBC and Auto Differential   Result Value Ref Range    WBC 15.8 (H) 4.4 - 11.3 x10*3/uL    nRBC 0.0 0.0 - 0.0 /100 WBCs    RBC 4.06 4.00 - 5.20 x10*6/uL    Hemoglobin 11.9 (L) 12.0 - 16.0 g/dL    Hematocrit 37.4 36.0 - 46.0 %    MCV 92 80 - 100 fL    MCH 29.3 26.0 - 34.0 pg    MCHC 31.8 (L) 32.0 - 36.0 g/dL    RDW 13.5 11.5 - 14.5 %    Platelets 215 150 - 450 x10*3/uL    Neutrophils % 71.0 40.0 - 80.0 %    Immature Granulocytes %, Automated 0.5 0.0 - 0.9 %    Lymphocytes % 18.2 13.0 - 44.0 %    Monocytes % 8.1 2.0 - 10.0 %    Eosinophils % 1.8 0.0 - 6.0 %    " Basophils % 0.4 0.0 - 2.0 %    Neutrophils Absolute 11.23 (H) 1.20 - 7.70 x10*3/uL    Immature Granulocytes Absolute, Automated 0.08 0.00 - 0.70 x10*3/uL    Lymphocytes Absolute 2.88 1.20 - 4.80 x10*3/uL    Monocytes Absolute 1.28 (H) 0.10 - 1.00 x10*3/uL    Eosinophils Absolute 0.28 0.00 - 0.70 x10*3/uL    Basophils Absolute 0.06 0.00 - 0.10 x10*3/uL   Comprehensive Metabolic Panel   Result Value Ref Range    Glucose 116 (H) 74 - 99 mg/dL    Sodium 137 136 - 145 mmol/L    Potassium 3.7 3.5 - 5.3 mmol/L    Chloride 104 98 - 107 mmol/L    Bicarbonate 25 21 - 32 mmol/L    Anion Gap 12 10 - 20 mmol/L    Urea Nitrogen 13 6 - 23 mg/dL    Creatinine 0.67 0.50 - 1.05 mg/dL    eGFR >90 >60 mL/min/1.73m*2    Calcium 8.5 (L) 8.6 - 10.3 mg/dL    Albumin 3.2 (L) 3.4 - 5.0 g/dL    Alkaline Phosphatase 90 33 - 110 U/L    Total Protein 6.3 (L) 6.4 - 8.2 g/dL    AST 21 9 - 39 U/L    Bilirubin, Total 0.5 0.0 - 1.2 mg/dL    ALT 25 7 - 45 U/L       Assessment & Plan    POD3 lap daria with Dr Walsh. Intra-op findings of gangrenous acute cholecystitis. Labs reviewed.    Plan:  - STAT CT ab/pel ordered- reports bad allergy to contrast  - Regular diet  - Encourage OOB/ambulation  - Encourage IS  - PRN antiemetic  - IV cipro/flagyl    Discussed with Dr Sanchez.    I spent 35 minutes in the professional and overall care of this patient.    Ulysses Kumar PA-C         [1] atorvastatin, 20 mg, oral, Daily  ciprofloxacin, 400 mg, intravenous, q12h  enoxaparin, 40 mg, subcutaneous, q24h  guaiFENesin, 1,200 mg, oral, BID  losartan, 50 mg, oral, Daily  metroNIDAZOLE, 500 mg, intravenous, q8h  pantoprazole, 40 mg, oral, BID AC  sennosides-docusate sodium, 2 tablet, oral, BID  sucralfate, 1 g, oral, q6h JAMEL  [2]    [3] PRN medications: acetaminophen **OR** acetaminophen **OR** acetaminophen, albuterol, morphine, ondansetron **OR** ondansetron, oxyCODONE, polyethylene glycol

## 2025-04-21 NOTE — PROGRESS NOTES
"Yuni Jordan is a 54 y.o. female on day 2 of admission presenting with Acute cholecystitis.    Assessment/Plan   Wbc 15 (downtrending). Still quite tender POD#3. Repeat CT shows collection in GB fossa, not unexpected given gangrenous cholecystitis. Continue IV abx. Trend wbc.     Subjective   Still with diffuse pain. Not much appetite.        Objective     Physical Exam  NAD  A&Ox3  Non icteric  CTA  RR  Abdomen soft diffuse tenderness  Extremities warm, well perfused     Last Recorded Vitals  Blood pressure 135/71, pulse 80, temperature 36.9 °C (98.4 °F), temperature source Temporal, resp. rate 18, height 1.575 m (5' 2\"), weight 89.8 kg (197 lb 15.6 oz), SpO2 93%.  Intake/Output last 3 Shifts:  I/O last 3 completed shifts:  In: 900 (10 mL/kg) [IV Piggyback:900]  Out: - (0 mL/kg)   Weight: 89.8 kg     Relevant Results    Scheduled medications  Scheduled Medications[1]  Continuous medications  Continuous Medications[2]  PRN medications  PRN Medications[3]    Results for orders placed or performed during the hospital encounter of 04/18/25 (from the past 24 hours)   CBC and Auto Differential   Result Value Ref Range    WBC 15.8 (H) 4.4 - 11.3 x10*3/uL    nRBC 0.0 0.0 - 0.0 /100 WBCs    RBC 4.06 4.00 - 5.20 x10*6/uL    Hemoglobin 11.9 (L) 12.0 - 16.0 g/dL    Hematocrit 37.4 36.0 - 46.0 %    MCV 92 80 - 100 fL    MCH 29.3 26.0 - 34.0 pg    MCHC 31.8 (L) 32.0 - 36.0 g/dL    RDW 13.5 11.5 - 14.5 %    Platelets 215 150 - 450 x10*3/uL    Neutrophils % 71.0 40.0 - 80.0 %    Immature Granulocytes %, Automated 0.5 0.0 - 0.9 %    Lymphocytes % 18.2 13.0 - 44.0 %    Monocytes % 8.1 2.0 - 10.0 %    Eosinophils % 1.8 0.0 - 6.0 %    Basophils % 0.4 0.0 - 2.0 %    Neutrophils Absolute 11.23 (H) 1.20 - 7.70 x10*3/uL    Immature Granulocytes Absolute, Automated 0.08 0.00 - 0.70 x10*3/uL    Lymphocytes Absolute 2.88 1.20 - 4.80 x10*3/uL    Monocytes Absolute 1.28 (H) 0.10 - 1.00 x10*3/uL    Eosinophils Absolute 0.28 0.00 - 0.70 " x10*3/uL    Basophils Absolute 0.06 0.00 - 0.10 x10*3/uL   Comprehensive Metabolic Panel   Result Value Ref Range    Glucose 116 (H) 74 - 99 mg/dL    Sodium 137 136 - 145 mmol/L    Potassium 3.7 3.5 - 5.3 mmol/L    Chloride 104 98 - 107 mmol/L    Bicarbonate 25 21 - 32 mmol/L    Anion Gap 12 10 - 20 mmol/L    Urea Nitrogen 13 6 - 23 mg/dL    Creatinine 0.67 0.50 - 1.05 mg/dL    eGFR >90 >60 mL/min/1.73m*2    Calcium 8.5 (L) 8.6 - 10.3 mg/dL    Albumin 3.2 (L) 3.4 - 5.0 g/dL    Alkaline Phosphatase 90 33 - 110 U/L    Total Protein 6.3 (L) 6.4 - 8.2 g/dL    AST 21 9 - 39 U/L    Bilirubin, Total 0.5 0.0 - 1.2 mg/dL    ALT 25 7 - 45 U/L           I spent 25 minutes in the professional and overall care of this patient.      Walter Sanchez MD           [1] atorvastatin, 20 mg, oral, Daily  ciprofloxacin, 400 mg, intravenous, q12h  enoxaparin, 40 mg, subcutaneous, q24h  guaiFENesin, 1,200 mg, oral, BID  losartan, 50 mg, oral, Daily  metroNIDAZOLE, 500 mg, intravenous, q8h  pantoprazole, 40 mg, oral, BID AC  sennosides-docusate sodium, 2 tablet, oral, BID  sucralfate, 1 g, oral, q6h JAMEL    [2]    [3] PRN medications: acetaminophen **OR** acetaminophen **OR** acetaminophen, albuterol, morphine, ondansetron **OR** ondansetron, oxyCODONE, polyethylene glycol

## 2025-04-22 LAB
BASOPHILS # BLD AUTO: 0.06 X10*3/UL (ref 0–0.1)
BASOPHILS NFR BLD AUTO: 0.4 %
EOSINOPHIL # BLD AUTO: 0.57 X10*3/UL (ref 0–0.7)
EOSINOPHIL NFR BLD AUTO: 4 %
ERYTHROCYTE [DISTWIDTH] IN BLOOD BY AUTOMATED COUNT: 13.7 % (ref 11.5–14.5)
HCT VFR BLD AUTO: 35.9 % (ref 36–46)
HGB BLD-MCNC: 11.7 G/DL (ref 12–16)
HOLD SPECIMEN: NORMAL
IMM GRANULOCYTES # BLD AUTO: 0.13 X10*3/UL (ref 0–0.7)
IMM GRANULOCYTES NFR BLD AUTO: 0.9 % (ref 0–0.9)
LYMPHOCYTES # BLD AUTO: 2.95 X10*3/UL (ref 1.2–4.8)
LYMPHOCYTES NFR BLD AUTO: 20.7 %
MCH RBC QN AUTO: 29.9 PG (ref 26–34)
MCHC RBC AUTO-ENTMCNC: 32.6 G/DL (ref 32–36)
MCV RBC AUTO: 92 FL (ref 80–100)
MONOCYTES # BLD AUTO: 1.85 X10*3/UL (ref 0.1–1)
MONOCYTES NFR BLD AUTO: 13 %
NEUTROPHILS # BLD AUTO: 8.7 X10*3/UL (ref 1.2–7.7)
NEUTROPHILS NFR BLD AUTO: 61 %
NRBC BLD-RTO: 0 /100 WBCS (ref 0–0)
PLATELET # BLD AUTO: 234 X10*3/UL (ref 150–450)
RBC # BLD AUTO: 3.91 X10*6/UL (ref 4–5.2)
WBC # BLD AUTO: 14.3 X10*3/UL (ref 4.4–11.3)

## 2025-04-22 PROCEDURE — 99232 SBSQ HOSP IP/OBS MODERATE 35: CPT

## 2025-04-22 PROCEDURE — 1100000001 HC PRIVATE ROOM DAILY

## 2025-04-22 PROCEDURE — 2500000004 HC RX 250 GENERAL PHARMACY W/ HCPCS (ALT 636 FOR OP/ED): Mod: JZ | Performed by: INTERNAL MEDICINE

## 2025-04-22 PROCEDURE — 2500000004 HC RX 250 GENERAL PHARMACY W/ HCPCS (ALT 636 FOR OP/ED): Mod: JZ | Performed by: HOSPITALIST

## 2025-04-22 PROCEDURE — 2500000004 HC RX 250 GENERAL PHARMACY W/ HCPCS (ALT 636 FOR OP/ED): Performed by: HOSPITALIST

## 2025-04-22 PROCEDURE — 36415 COLL VENOUS BLD VENIPUNCTURE: CPT | Performed by: INTERNAL MEDICINE

## 2025-04-22 PROCEDURE — 2500000004 HC RX 250 GENERAL PHARMACY W/ HCPCS (ALT 636 FOR OP/ED): Mod: JZ

## 2025-04-22 PROCEDURE — 2500000001 HC RX 250 WO HCPCS SELF ADMINISTERED DRUGS (ALT 637 FOR MEDICARE OP): Performed by: HOSPITALIST

## 2025-04-22 PROCEDURE — 97161 PT EVAL LOW COMPLEX 20 MIN: CPT | Mod: GP

## 2025-04-22 PROCEDURE — 2500000002 HC RX 250 W HCPCS SELF ADMINISTERED DRUGS (ALT 637 FOR MEDICARE OP, ALT 636 FOR OP/ED): Performed by: HOSPITALIST

## 2025-04-22 PROCEDURE — 2500000001 HC RX 250 WO HCPCS SELF ADMINISTERED DRUGS (ALT 637 FOR MEDICARE OP): Performed by: INTERNAL MEDICINE

## 2025-04-22 PROCEDURE — 85025 COMPLETE CBC W/AUTO DIFF WBC: CPT | Performed by: INTERNAL MEDICINE

## 2025-04-22 PROCEDURE — 97165 OT EVAL LOW COMPLEX 30 MIN: CPT | Mod: GO

## 2025-04-22 PROCEDURE — 99232 SBSQ HOSP IP/OBS MODERATE 35: CPT | Performed by: HOSPITALIST

## 2025-04-22 RX ORDER — ACETAMINOPHEN 325 MG/1
975 TABLET ORAL 3 TIMES DAILY
Status: DISCONTINUED | OUTPATIENT
Start: 2025-04-22 | End: 2025-04-28 | Stop reason: HOSPADM

## 2025-04-22 RX ORDER — ADHESIVE BANDAGE
30 BANDAGE TOPICAL DAILY PRN
Status: DISCONTINUED | OUTPATIENT
Start: 2025-04-22 | End: 2025-04-28 | Stop reason: HOSPADM

## 2025-04-22 RX ORDER — POLYETHYLENE GLYCOL 3350 17 G/17G
17 POWDER, FOR SOLUTION ORAL 2 TIMES DAILY
Status: DISCONTINUED | OUTPATIENT
Start: 2025-04-22 | End: 2025-04-25

## 2025-04-22 RX ADMIN — OXYCODONE HYDROCHLORIDE 5 MG: 5 TABLET ORAL at 10:16

## 2025-04-22 RX ADMIN — PANTOPRAZOLE SODIUM 40 MG: 40 TABLET, DELAYED RELEASE ORAL at 17:08

## 2025-04-22 RX ADMIN — SUCRALFATE ORAL SUSPENSION 1 G: 1 SUSPENSION ORAL at 18:23

## 2025-04-22 RX ADMIN — ACETAMINOPHEN 975 MG: 325 TABLET ORAL at 12:58

## 2025-04-22 RX ADMIN — SUCRALFATE ORAL SUSPENSION 1 G: 1 SUSPENSION ORAL at 00:08

## 2025-04-22 RX ADMIN — CIPROFLOXACIN 400 MG: 400 INJECTION, SOLUTION INTRAVENOUS at 18:23

## 2025-04-22 RX ADMIN — ENOXAPARIN SODIUM 40 MG: 40 INJECTION SUBCUTANEOUS at 09:53

## 2025-04-22 RX ADMIN — PANTOPRAZOLE SODIUM 40 MG: 40 TABLET, DELAYED RELEASE ORAL at 06:07

## 2025-04-22 RX ADMIN — SENNOSIDES AND DOCUSATE SODIUM 2 TABLET: 50; 8.6 TABLET ORAL at 20:04

## 2025-04-22 RX ADMIN — GUAIFENESIN 1200 MG: 600 TABLET ORAL at 09:53

## 2025-04-22 RX ADMIN — SUCRALFATE ORAL SUSPENSION 1 G: 1 SUSPENSION ORAL at 12:32

## 2025-04-22 RX ADMIN — OXYCODONE HYDROCHLORIDE 5 MG: 5 TABLET ORAL at 00:22

## 2025-04-22 RX ADMIN — LOSARTAN POTASSIUM 50 MG: 50 TABLET, FILM COATED ORAL at 09:53

## 2025-04-22 RX ADMIN — ONDANSETRON HYDROCHLORIDE 4 MG: 4 TABLET, FILM COATED ORAL at 18:23

## 2025-04-22 RX ADMIN — METRONIDAZOLE 500 MG: 500 INJECTION, SOLUTION INTRAVENOUS at 09:54

## 2025-04-22 RX ADMIN — ATORVASTATIN CALCIUM 20 MG: 20 TABLET, FILM COATED ORAL at 09:53

## 2025-04-22 RX ADMIN — SENNOSIDES AND DOCUSATE SODIUM 2 TABLET: 50; 8.6 TABLET ORAL at 09:53

## 2025-04-22 RX ADMIN — OXYCODONE HYDROCHLORIDE 5 MG: 5 TABLET ORAL at 04:23

## 2025-04-22 RX ADMIN — CIPROFLOXACIN 400 MG: 400 INJECTION, SOLUTION INTRAVENOUS at 06:07

## 2025-04-22 RX ADMIN — OXYCODONE HYDROCHLORIDE 5 MG: 5 TABLET ORAL at 14:54

## 2025-04-22 RX ADMIN — POLYETHYLENE GLYCOL 3350 17 G: 17 POWDER, FOR SOLUTION ORAL at 12:58

## 2025-04-22 RX ADMIN — METRONIDAZOLE 500 MG: 500 INJECTION, SOLUTION INTRAVENOUS at 18:24

## 2025-04-22 RX ADMIN — METRONIDAZOLE 500 MG: 500 INJECTION, SOLUTION INTRAVENOUS at 01:29

## 2025-04-22 RX ADMIN — ACETAMINOPHEN 975 MG: 325 TABLET ORAL at 20:04

## 2025-04-22 RX ADMIN — GUAIFENESIN 1200 MG: 600 TABLET ORAL at 20:04

## 2025-04-22 RX ADMIN — POLYETHYLENE GLYCOL 3350 17 G: 17 POWDER, FOR SOLUTION ORAL at 20:13

## 2025-04-22 RX ADMIN — SUCRALFATE ORAL SUSPENSION 1 G: 1 SUSPENSION ORAL at 06:07

## 2025-04-22 ASSESSMENT — PAIN SCALES - GENERAL
PAINLEVEL_OUTOF10: 6
PAINLEVEL_OUTOF10: 9
PAINLEVEL_OUTOF10: 4
PAINLEVEL_OUTOF10: 9
PAINLEVEL_OUTOF10: 4
PAINLEVEL_OUTOF10: 4
PAINLEVEL_OUTOF10: 3
PAINLEVEL_OUTOF10: 9
PAINLEVEL_OUTOF10: 8
PAINLEVEL_OUTOF10: 8
PAINLEVEL_OUTOF10: 9

## 2025-04-22 ASSESSMENT — COGNITIVE AND FUNCTIONAL STATUS - GENERAL
MOBILITY SCORE: 12
MOBILITY SCORE: 24
DAILY ACTIVITIY SCORE: 15
PERSONAL GROOMING: A LITTLE
CLIMB 3 TO 5 STEPS WITH RAILING: TOTAL
WALKING IN HOSPITAL ROOM: A LITTLE
EATING MEALS: A LITTLE
MOVING TO AND FROM BED TO CHAIR: A LOT
HELP NEEDED FOR BATHING: A LOT
MOBILITY SCORE: 24
DAILY ACTIVITIY SCORE: 24
STANDING UP FROM CHAIR USING ARMS: A LOT
TURNING FROM BACK TO SIDE WHILE IN FLAT BAD: A LOT
TOILETING: A LOT
DRESSING REGULAR UPPER BODY CLOTHING: A LITTLE
MOVING FROM LYING ON BACK TO SITTING ON SIDE OF FLAT BED WITH BEDRAILS: A LOT
DRESSING REGULAR LOWER BODY CLOTHING: A LOT
DAILY ACTIVITIY SCORE: 24

## 2025-04-22 ASSESSMENT — PAIN DESCRIPTION - ORIENTATION
ORIENTATION: LOWER;MID;UPPER
ORIENTATION: RIGHT
ORIENTATION: LOWER;MID;UPPER

## 2025-04-22 ASSESSMENT — PAIN - FUNCTIONAL ASSESSMENT
PAIN_FUNCTIONAL_ASSESSMENT: 0-10

## 2025-04-22 ASSESSMENT — ACTIVITIES OF DAILY LIVING (ADL)
ADL_ASSISTANCE: INDEPENDENT
ADL_ASSISTANCE: INDEPENDENT

## 2025-04-22 ASSESSMENT — PAIN DESCRIPTION - LOCATION
LOCATION: ABDOMEN

## 2025-04-22 NOTE — PROGRESS NOTES
Occupational Therapy    Evaluation    Patient Name: Yuni Jordan  MRN: [id number]  Department: [place] [address]  Room: [address] [place] [address]  Today's Date: 4/22/2025  Time Calculation  Start Time: 0856  Stop Time: 0920  Time Calculation (min): 24 min        Assessment:  OT Assessment: Pt presents with decreased balance, endurance, strength and acitivty tolerance with an increase in fatigue and pain, impeding ADL performance and functional mobility. Pt would benefit from skilled OT services to address these deficits and facilitate highest level of function.  Prognosis: Good  Barriers to Discharge Home: Caregiver assistance, Physical needs  Caregiver Assistance: Patient lives alone and/or does not have reliable caregiver assistance  Physical Needs: 24hr mobility assistance needed, 24hr ADL assistance needed, High falls risk due to function or environment  Evaluation/Treatment Tolerance: Patient limited by fatigue, Patient limited by pain  Medical Staff Made Aware: Yes  End of Session Communication: Bedside nurse  End of Session Patient Position: Bed, 3 rail up, Alarm on  OT Assessment Results: Decreased ADL status, Decreased safe judgment during ADL, Decreased endurance, Decreased functional mobility, Decreased IADLs  Prognosis: Good  Barriers to Discharge: Decreased caregiver support  Evaluation/Treatment Tolerance: Patient limited by fatigue, Patient limited by pain  Medical Staff Made Aware: Yes  Strengths: Ability to acquire knowledge, Premorbid level of function, Support of extended family/friends  Barriers to Participation: Comorbidities  Plan:  Treatment Interventions: ADL retraining, Functional transfer training, UE strengthening/ROM, Endurance training, Patient/family training, Equipment evaluation/education, Compensatory technique education  OT Frequency: 3 times per week  OT Discharge Recommendations: Moderate intensity level of continued care  OT Recommended Transfer Status: Assist of 1  OT - OK to Discharge: Yes (Per  POC)  Treatment Interventions: ADL retraining, Functional transfer training, UE strengthening/ROM, Endurance training, Patient/family training, Equipment evaluation/education, Compensatory technique education    Subjective   Current Problem:  1. Acute cholecystitis  AFB Culture/Smear    AFB Culture/Smear    Fungal Culture/Smear    Fungal Culture/Smear    Sterile Fluid Culture/Smear    Sterile Fluid Culture/Smear    Surgical Pathology Exam    Surgical Pathology Exam    CANCELED: FL GI cholangiography intraop    CANCELED: FL GI cholangiography intraop        General:  General  Reason for Referral: 54 y.o. female s/p  cholecystectomy, laparoscopic on 4/18. Recent admission on 4/16 for abdominal pain.  Referred By: Radha Rodriguez MD  Past Medical History Relevant to Rehab: Medical History[1]  Family/Caregiver Present: No  Co-Treatment: PT  Co-Treatment Reason: To maximize pt safety, mobility and performance  Prior to Session Communication: Bedside nurse  Patient Position Received: Bed, 3 rail up (Seated at EOB)  Preferred Learning Style: auditory, visual, verbal  General Comment: Pt agreeable and cooperative to OT/PT eval, limited by pain and fatigue.  Precautions:  Medical Precautions: Fall precautions, Abdominal precautions          Pain:  Pain Assessment  Pain Assessment: 0-10  0-10 (Numeric) Pain Score: 9  Pain Type: Surgical pain  Pain Location: Abdomen  Pain Interventions: Repositioned, Ambulation/increased activity  Response to Interventions: Increase in pain (10/10 pain)    Objective   Cognition:  Overall Cognitive Status: Within Functional Limits  Orientation Level: Oriented X4  Attention: Within Functional Limits  Memory: Within Funtional Limits           Home Living:  Type of Home: House  Lives With: Alone  Home Adaptive Equipment: Walker rolling or standard, Wheelchair-manual  Home Layout: One level, Laundry in basement  Home Access: Stairs to enter with rails  Entrance Stairs-Rails: Left  Entrance  Stairs-Number of Steps: 2  Bathroom Shower/Tub: Tub/shower unit  Bathroom Toilet: Handicapped height  Bathroom Equipment: None  Prior Function:  Level of [place]: Independent with ADLs and functional transfers, Independent with homemaking with ambulation  Receives Help From:  (Friends live nearby and father currently visiting pt.)  ADL Assistance: Independent  Homemaking Assistance: Independent  Ambulatory Assistance: Independent (No AD)  Prior Function Comments: (+) drives; (-) falls     ADL:  UE Dressing Assistance: Minimal  UE Dressing Deficit:  ([facility] gown (backwards))  LE Dressing Assistance: Stand by  LE Dressing Deficit: Requires assistive device for steadying, Verbal cueing, Supervision/safety, Increased time to complete, Don/doff R sock, Don/doff L sock (Very effortful)  Toileting Deficit:  (Pt had just completed use bedside commode for urination)  Activity Tolerance:  Endurance: Decreased tolerance for upright activites, Tolerates less than 10 min exercise, no significant change in vital signs  Activity Tolerance Comments: Limited by pain and fatigue  Bed Mobility/Transfers: Bed Mobility  Bed Mobility: Yes  Bed Mobility 1  Bed Mobility 1: Sitting to supine  Level of Assistance 1: Minimum assistance  Bed Mobility Comments 1: HOB elevated, cues and instruction for log roll technique, pt unable to tolerate attempting d/t pain. Increased time and effortful to complete.    Transfers  Transfer: Yes  Transfer 1  Transfer From 1: Bed to  Transfer to 1: Stand  Technique 1: Sit to stand, Stand to sit  Transfer Device 1: Walker, Gait belt  Transfer Level of Assistance 1: Minimum assistance, +2  Trials/Comments 1: Cues for safe hand placement and body positioning; pt demo lateral left lean during sit<>stand transfer. Inc time and effortful.      Functional Mobility:  Functional Mobility  Functional Mobility Performed: Yes  Functional Mobility 1  Device 1: Rolling walker  Functional Mobility Support  Devices: Gait belt  Assistance 1: Contact guard  Comments 1: Pt completed ~4 lateral side steps toward HOB with FWW and CGA. Cues for sequencing and walker management. Pt unable to tolerate increase in functional mobility at this time.  Sitting Balance:  Static Sitting Balance  Static Sitting-Balance Support: Feet supported  Static Sitting-Level of Assistance: Close supervision, Distant supervision  Static Sitting-Comment/Number of Minutes: EOB, lateral L lean  Dynamic Sitting Balance  Dynamic Sitting-Balance Support: Feet supported  Dynamic Sitting-Level of Assistance: Close supervision  Dynamic Sitting-Balance: Reaching across midline  Dynamic Sitting-Comments: While completing ADL tasks  Standing Balance:  Static Standing Balance  Static Standing-Balance Support: Bilateral upper extremity supported  Static Standing-Level of Assistance: Close supervision, Contact guard  Static Standing-Comment/Number of Minutes: FWW  Dynamic Standing Balance  Dynamic Standing-Balance Support: Bilateral upper extremity supported  Dynamic Standing-Level of Assistance: Contact guard  Dynamic Standing-Comments: FWW      Vision:Vision - Basic Assessment  Current Vision: No visual deficits  Sensation:  Sensation Comment: Appears intact  Strength:  Strength Comments: Limited bilateral shoulder flexion     Coordination:  Movements are Fluid and Coordinated: Yes  Coordination Comment: Appears intact   Hand Function:  Gross Grasp: Functional  Coordination: Functional  Extremities: RUE   RUE : Within Functional Limits (Grossly greater than or equal to 3/5 distally) and LUE   LUE: Within Functional Limits (Grossly greater than or equal to 3/5 distally)    Outcome Measures:[facility] Daily Activity  Putting on and taking off regular lower body clothing: A lot  Bathing (including washing, rinsing, drying): A lot  Putting on and taking off regular upper body clothing: A little  Toileting, which includes using toilet, bedpan or urinal: A lot  Taking  care of personal grooming such as brushing teeth: A little  Eating Meals: A little  Daily Activity - Total Score: 15        Education Documentation  Body Mechanics, taught by Nallely Nelson OT at 4/22/2025 10:03 AM.  Learner: Patient  Readiness: Acceptance  Method: Explanation  Response: Verbalizes Understanding    Precautions, taught by Nallely Nelson OT at 4/22/2025 10:03 AM.  Learner: Patient  Readiness: Acceptance  Method: Explanation  Response: Verbalizes Understanding    ADL Training, taught by Nallely Nelson OT at 4/22/2025 10:03 AM.  Learner: Patient  Readiness: Acceptance  Method: Explanation  Response: Verbalizes Understanding    Education Comments  No comments found.        OP EDUCATION:       Goals:  Encounter Problems       Encounter Problems (Active)       ADLs       Patient will perform UB and LB sponge bathing with stand by assist level of assistance and use of adaptive techniques/equipment.       Start:  04/22/25    Expected End:  05/06/25            Patient with complete upper body dressing with modified independent level of assistance donning and doffing all UE clothes with PRN adaptive equipment while seated.       Start:  04/22/25    Expected End:  05/06/25            Patient with complete lower body dressing with stand by assist level of assistance donning and doffing all LE clothes  with PRN adaptive equipment while seated.       Start:  04/22/25    Expected End:  05/06/25            Patient will complete toileting including hygiene clothing management/hygiene with supervision level of assistance and raised toilet seat and grab bars.       Start:  04/22/25    Expected End:  05/06/25               MOBILITY       Patient will perform Functional mobility x Household distances/Community Distances with supervision level of assistance and least restrictive device in order to improve safety and functional mobility.       Start:  04/22/25    Expected End:  05/06/25               TRANSFERS        Patient will perform bed mobility supervision level of assistance and bed rails in order to improve safety and independence with mobility       Start:  04/22/25    Expected End:  05/06/25            Patient will complete sit to stand transfer with stand by assist level of assistance and least restrictive device in order to improve safety and prepare for out of bed mobility.       Start:  04/22/25    Expected End:  05/06/25                                   [1]   Past Medical History:  Diagnosis Date    Asthma     Diabetes mellitus (Multi)     patient states she used to be but she guesses so    Eczema 1971    GERD (gastroesophageal reflux disease) 2012    ARTUR (obstructive sleep apnea)     patient denies    Peptic ulceration 2015

## 2025-04-22 NOTE — PROGRESS NOTES
Between 7AM-7PM please message me via Epic Secure Chat.  After 7PM please page Nocturnist on call.    [facility] Hospitalist Progress Note      Yuni Jordan    :  1970(54 y.o.)    MRN:  [id number]  Date: 25     Assessment and Plan:     Acute Cholecystitis  - Surgery consulted. Lap daria on  with Dr. Walsh  - CT  showed Fluid collection with few foci of gas within the surgical bed with minimal extension along the anterior hepatic margin -> expected post op finding per surgery team. WBC continues to down trend.  - Continue Cipro/Flagyl with end date   - adia tylenol, oxycodone prn; IV morphine ordered but limiting use as goal is home once pain controlled and will only have PO meds at home  - continue low fat diet  - OOB and ambulate as much as tolerated    Post op constipation  - adia miralax bid, senna bid  - milk of mag prn    Gastrojejunostomy ucler  - Seen on EGD 25. PPI BID. Add carafate qid  - op follow up with GI for repeat EGD around 6/15/25    HTN  - continue losartan    HLD  - on statin    DVT Prophylaxis: subcutaneous Lovenox    Disposition: awaiting improvement in abd pain    Subjective:      Interval History:   Vitals and chart notes from overnight reviewed.   No acute issues overnight.   Patient seen and evaluated at bedside.   Not using IV pain meds much but reports pain still poorly controlled. PO intake poor as she doesn't tolerate much of what's on the menu due to her ulcers.     Review of Systems:   Other than patient's chronic conditions and those complaints in the history above, the rest of the 10 systems review were done and were negative.     Current medications:  Scheduled Meds:Scheduled Medications[1]  Continuous Infusions:Continuous Medications[2]  PRN Meds:PRN Medications[3]      Objective:     Heart Rate:  [78-89]   Temp:  [36.2 °C (97.1 °F)-36.8 °C (98.2 °F)]   Resp:  [18-19]   BP: (123-151)/(69-98)   SpO2:  [94 %-97 %]     Oxygen Dose: *4  L/min    Physical Exam  Vitals and nursing note reviewed.   HENT:      Mouth/Throat:      Mouth: Mucous membranes are moist.      Pharynx: Oropharynx is clear.   Cardiovascular:      Rate and Rhythm: Normal rate and regular rhythm.   Pulmonary:      Effort: Pulmonary effort is normal.   Abdominal:      General: There is no distension.      Palpations: Abdomen is soft.      Tenderness: There is abdominal tenderness.   Neurological:      Mental Status: She is alert and oriented to person, place, and time.         Labs:   Lab Results   Component Value Date     04/21/2025    K 3.7 04/21/2025     04/21/2025    CO2 25 04/21/2025    BUN 13 04/21/2025    CREATININE 0.67 04/21/2025    GLUCOSE 116 (H) 04/21/2025    CALCIUM 8.5 (L) 04/21/2025    PROT 6.3 (L) 04/21/2025    BILITOT 0.5 04/21/2025    ALKPHOS 90 04/21/2025    AST 21 04/21/2025    ALT 25 04/21/2025       Lab Results   Component Value Date    WBC 14.3 (H) 04/22/2025    HGB 11.7 (L) 04/22/2025    HCT 35.9 (L) 04/22/2025    MCV 92 04/22/2025     04/22/2025            [1] acetaminophen, 975 mg, oral, TID  atorvastatin, 20 mg, oral, Daily  ciprofloxacin, 400 mg, intravenous, q12h  enoxaparin, 40 mg, subcutaneous, q24h  guaiFENesin, 1,200 mg, oral, BID  losartan, 50 mg, oral, Daily  metroNIDAZOLE, 500 mg, intravenous, q8h  pantoprazole, 40 mg, oral, BID AC  polyethylene glycol, 17 g, oral, BID  sennosides-docusate sodium, 2 tablet, oral, BID  sucralfate, 1 g, oral, q6h JAMEL     [2]    [3] PRN medications: albuterol, morphine, ondansetron **OR** ondansetron, oxyCODONE

## 2025-04-22 NOTE — PROGRESS NOTES
Nutrition Follow-up Note  Nutrition Assessment      Yuni Jordan is a 54 y.o. year old female patient with Acute cholecystitis [K81.0]  Cholecystitis [K81.9]  On day #4 of hospital admission.    Pt asking for nutrition information for home going purposes.    Per chart review:  -Pt doing okay, still in poor, appetite poor    Per pt:  -Allergic to all nuts, fish and shellfish  -Has poor appetite, difficulty eating, full quickly    -Has had weight loss surgery, every 3-5 years has to have surgery to repair hernia  -Usually does smaller meals throughout the day  -No wt losses, maybe a few lbs  -Takes an MVI daily  -Plans to work with an RD outpatient    Discussed low fat diet for discharge purposes. Left pt with handouts.    Scheduled medications  Scheduled Medications[1]  Continuous medications  Continuous Medications[2]  PRN medications  PRN Medications[3]    Nutrition Significant Labs:  BMP Trend:   Results from last 7 days   Lab Units 04/21/25  0649 04/20/25  0522 04/19/25  0522 04/18/25  1439   GLUCOSE mg/dL 116* 115* 145* 167*   CALCIUM mg/dL 8.5* 8.8 9.0 9.4   SODIUM mmol/L 137 137 137 138   POTASSIUM mmol/L 3.7 3.4* 3.8 3.9   CO2 mmol/L 25 25 22 21   CHLORIDE mmol/L 104 104 104 104   BUN mg/dL 13 15 16 13   CREATININE mg/dL 0.67 0.70 0.74 0.70    , BG POCT trend:   Results from last 7 days   Lab Units 04/18/25  2354 04/18/25  1950 04/18/25  1804 04/16/25  1122 04/16/25  1020   POCT GLUCOSE mg/dL 220* 193* 179* 77 81    , Liver Function Trend:   Results from last 7 days   Lab Units 04/21/25  0649 04/20/25  0522 04/18/25  1439 04/15/25  1533   ALK PHOS U/L 90 91 114* 124*   AST U/L 21 32 16 15   ALT U/L 25 30 14 19   BILIRUBIN TOTAL mg/dL 0.5 0.5 0.7 0.6    , Renal Lab Trend:   Results from last 7 days   Lab Units 04/21/25  0649 04/20/25  0522 04/19/25  0522 04/18/25  1439   POTASSIUM mmol/L 3.7 3.4* 3.8 3.9   SODIUM mmol/L 137 137 137 138   EGFR mL/min/1.73m*2 >90 >90 >90 >90   BUN mg/dL 13 15 16 13  "  CREATININE mg/dL 0.67 0.70 0.74 0.70    , TPN/PPN Labs:   Results from last 7 days   Lab Units 04/21/25  0649 04/20/25  0522 04/19/25  0522 04/18/25  1439   GLUCOSE mg/dL 116* 115* 145* 167*   POTASSIUM mmol/L 3.7 3.4* 3.8 3.9   SODIUM mmol/L 137 137 137 138   CHLORIDE mmol/L 104 104 104 104   ALT U/L 25 30  --  14   AST U/L 21 32  --  16   ALK PHOS U/L 90 91  --  114*   BILIRUBIN TOTAL mg/dL 0.5 0.5  --  0.7    , Lipid Panel:   Lab Results   Component Value Date    CHOL 177 02/11/2023    HDL 49.5 02/11/2023    CHHDL 3.6 02/11/2023    LDLF 109 (H) 02/11/2023    VLDL 19 02/11/2023    TRIG 93 02/11/2023    , Vit D:   Lab Results   Component Value Date    VITD25 15 (A) 02/11/2023    , Vit B12:   Lab Results   Component Value Date    SFJBKCRR73 1,459 (H) 02/11/2023    , Iron Panel:   Lab Results   Component Value Date    IRON 66 03/17/2021    TIBC 403 03/17/2021    FERRITIN 66 03/17/2021      Dietary Orders (From admission, onward)       Start     Ordered    04/22/25 1227  Adult diet Regular, Fat restricted 60 gm  Diet effective now        Comments: low fat diet   Question Answer Comment   Diet type Regular    Diet type Fat restricted 60 gm        04/22/25 1227    04/21/25 1341  Oral nutritional supplements  Until discontinued        Question Answer Comment   Deliver with Breakfast    Deliver with Dinner    Select supplement: Ensure Clear        04/21/25 1341    04/18/25 2330  May Participate in Room Service  ( ROOM SERVICE MAY PARTICIPATE)  Once        Question:  .  Answer:  Yes    04/18/25 2329                     History:  Energy Intake: Poor < 50 %, Fair 50-75 %    Anthropometrics:  Height: 157.5 cm (5' 2.01\")  Weight: 89.8 kg (197 lb 15.6 oz)  BMI (Calculated): 36.2    Weight Change: 0    Wt Readings from Last 6 Encounters:   04/21/25 89.8 kg (197 lb 15.6 oz)   04/15/25 91.6 kg (202 lb)   04/15/25 95.7 kg (211 lb)   03/25/25 95.8 kg (211 lb 4.8 oz)   01/02/24 88.5 kg (195 lb 1.7 oz)   09/07/23 87.8 kg (193 lb " "9.6 oz)     Significant Weight Loss: Yes  Interpretation of Weight Loss: >2% in 1 week       IBW/kg (Dietitian Calculated): 50 kg  Percent of IBW: 180 %       Energy Needs:  Height: 157.5 cm (5' 2.01\")  Temp: 36.3 °C (97.3 °F)    Total Energy Estimated Needs in 24 hours (kCal): 1800 kCal  Energy Estimated Needs per kg Body Weight in 24 hours (kCal/kg): 1975 kCal/kg  Method for Estimating Needs: 20-22kcal/kg    Total Protein Estimated Needs in 24 Hours (g): 75 g  Protein Estimated Needs per kg Body Weight in 24 Hours (g/kg): 100 g/kg  Method for Estimating 24 Hour Protein Needs: 1.5-2.0g/kg (IBW)    Method for Estimating 24 Hour Fluid Needs: 1mL/kcal or MD recommendations       Nutrition Focused Physical Findings:  Orbital Fat Pads: Well nourished (slightly bulging fat pads)  Buccal Fat Pads: Well nourished (full, rounded cheeks)    Temporalis: Well nourished (well-defined muscle)  Pectoralis (Clavicular Region): Well nourished (clavicle not visible)    Edema: none       Skin: Positive (surgical wound abd)  Positive Skin Findings: Impaired wound healing  Digestive System Findings: Abdominal pain, Nausea       Nutrition Diagnosis   Malnutrition Diagnosis  Patient has Malnutrition Diagnosis: Yes  Diagnosis Status: New  Malnutrition Diagnosis: Severe malnutrition related to chronic disease or condition  Related to: cholecystitis  As Evidenced by: Greater than 2% weight loss in 1 week and prolonged poor intake prior to hospital admit less than 50% of estimated energy needs for greater than 5 days    Nutrition Interventions/Recommendations   Nutrition Prescription: Nutrition prescription for oral nutrition  Individualized Nutrition Prescription Provided for : Continue Ensure clear ONS  Food and/or Nutrient Delivery Interventions  Meals and Snacks: General healthful diet, Fat-modified diet     Medical Food Supplement: Commercial beverage medical food supplement therapy  Goal: Ensure clear nutritional supplements provide " 240kcals and 8g of protein per serving    Collaboration and Referral of Nutrition Care: Collaboration by nutrition professional with other providers  Coordination of Care with Providers: Other (Comment)    Education Documentation  Nutrition Care Manual, taught by Smita Ag RDN, LD at 4/22/2025  3:11 PM.  Learner: Family, Patient  Readiness: Acceptance  Method: Explanation, Handout  Response: Verbalizes Understanding      Discussed low fat diet with pt:  Encouraged foods that are lower in fat (1% dairy, yogurt, fruits and vegetables, lean cuts of meats, pasta).  Recommend avoiding high fat foods such as high fat dairy (whole milk dairy), waffles, biscuits, cuts of meats higher in fat (processed meats, red meat, fried meats), butter, margarine. Left handouts Fat restricted nutrition therapy and Gallbladder nutrition therapy        Nutrition Monitoring and Evaluation   Food and Nutrient Related History  Estimated Energy Intake: Energy intake greater or equal to 75% of estimated energy needs    Fluid Intake: Estimated fluid intake    Intake / Amount of food: Meets > 75% estimated energy needs, Consumes at least 75% or more of meals/snacks/supplements    Anthropometrics: Body Composition/Growth/Weight History  Body Weight: Body weight - Maintain stable weight    Body Weight Change: Body weight loss - Gradual weight loss    Biochemical Data, Medical Tests and Procedures  Electrolyte and Renal Panel: Other (Comment), Calcium, serum, Calcium, ionized  Criteria: As clinically indicated    Gastrointestinal Profile: Other (Comment)  Criteria: As clinically indicated    Glucose/Endocrine Profile: Glucose within normal limits ( mg/dL)  Criteria: As clinically indicated    Nutritional Anemia Profile: Other (Comment)  Criteria: As clinically indicated    Vitamin Profile: Other (Comment)  Criteria: As clinically indicated    Nutrition Focused Physical Findings     Digestive System Finding: Abdominal pain,  Nausea    Time Spent (min): 35 minutes  Last Date of Nutrition Visit: 04/22/25  Nutrition Follow-Up Needed?: Dietitian to reassess per policy  Follow up Comment: CHRISTOPHER Redd            [1] acetaminophen, 975 mg, oral, TID  atorvastatin, 20 mg, oral, Daily  ciprofloxacin, 400 mg, intravenous, q12h  enoxaparin, 40 mg, subcutaneous, q24h  guaiFENesin, 1,200 mg, oral, BID  losartan, 50 mg, oral, Daily  metroNIDAZOLE, 500 mg, intravenous, q8h  pantoprazole, 40 mg, oral, BID AC  polyethylene glycol, 17 g, oral, BID  sennosides-docusate sodium, 2 tablet, oral, BID  sucralfate, 1 g, oral, q6h JAMEL     [2]    [3] PRN medications: albuterol, morphine, ondansetron **OR** ondansetron, oxyCODONE

## 2025-04-22 NOTE — CARE PLAN
The patient's goals for the shift include  pain management.    The clinical goals for the shift include pain management      Problem: Pain - Adult  Goal: Verbalizes/displays adequate comfort level or baseline comfort level  Outcome: Progressing     Problem: Safety - Adult  Goal: Free from fall injury  Outcome: Progressing     Problem: Discharge Planning  Goal: Discharge to home or other facility with appropriate resources  Outcome: Progressing     Problem: Chronic Conditions and Co-morbidities  Goal: Patient's chronic conditions and co-morbidity symptoms are monitored and maintained or improved  Outcome: Progressing     Problem: Nutrition  Goal: Nutrient intake appropriate for maintaining nutritional needs  Outcome: Progressing     Problem: Diabetes  Goal: Achieve decreasing blood glucose levels by end of shift  Outcome: Progressing  Goal: Increase stability of blood glucose readings by end of shift  Outcome: Progressing  Goal: Decrease in ketones present in urine by end of shift  Outcome: Progressing  Goal: Maintain electrolyte levels within acceptable range throughout shift  Outcome: Progressing  Goal: Maintain glucose levels >70mg/dl to <250mg/dl throughout shift  Outcome: Progressing  Goal: No changes in neurological exam by end of shift  Outcome: Progressing  Goal: Learn about and adhere to nutrition recommendations by end of shift  Outcome: Progressing  Goal: Vital signs within normal range for age by end of shift  Outcome: Progressing  Goal: Increase self care and/or family involovement by end of shift  Outcome: Progressing  Goal: Receive DSME education by end of shift  Outcome: Progressing     Problem: Pain  Goal: Takes deep breaths with improved pain control throughout the shift  Outcome: Progressing  Goal: Turns in bed with improved pain control throughout the shift  Outcome: Progressing  Goal: Walks with improved pain control throughout the shift  Outcome: Progressing  Goal: Performs ADL's with improved  pain control throughout shift  Outcome: Progressing  Goal: Participates in PT with improved pain control throughout the shift  Outcome: Progressing  Goal: Free from opioid side effects throughout the shift  Outcome: Progressing  Goal: Free from acute confusion related to pain meds throughout the shift  Outcome: Progressing     Problem: Fall/Injury  Goal: Not fall by end of shift  Outcome: Progressing  Goal: Be free from injury by end of the shift  Outcome: Progressing  Goal: Verbalize understanding of personal risk factors for fall in the hospital  Outcome: Progressing  Goal: Verbalize understanding of risk factor reduction measures to prevent injury from fall in the home  Outcome: Progressing  Goal: Use assistive devices by end of the shift  Outcome: Progressing  Goal: Pace activities to prevent fatigue by end of the shift  Outcome: Progressing

## 2025-04-22 NOTE — CARE PLAN
The patient's goals for the shift include      The clinical goals for the shift include pt will have decreased pain      Problem: Pain - Adult  Goal: Verbalizes/displays adequate comfort level or baseline comfort level  Outcome: Progressing

## 2025-04-22 NOTE — PROGRESS NOTES
Physical Therapy    Physical Therapy Evaluation    Patient Name: Yuni Jordan  MRN: [id number]  Department: [facility] [id number]  Room: [address] [place] [address]  Today's Date: 4/22/2025   Time Calculation  Start Time: 0857  Stop Time: 0919  Time Calculation (min): 22 min    Assessment/Plan   PT Assessment  PT Assessment Results: Decreased strength, Impaired balance, Decreased mobility, Pain  Rehab Prognosis: Fair  Barriers to Discharge Home: Physical needs, Caregiver assistance  Caregiver Assistance: Patient lives alone and/or does not have reliable caregiver assistance  Physical Needs: Stair navigation into home limited by function/safety, Ambulating household distances limited by function/safety, Intermittent mobility assistance needed, High falls risk due to function or environment  Evaluation/Treatment Tolerance: Patient tolerated treatment well, Patient limited by pain, Patient limited by fatigue  Medical Staff Made Aware: Yes  Strengths: Ability to acquire knowledge, Attitude of self, Premorbid level of function, Support of extended family/friends  Barriers to Participation: Comorbidities  End of Session Communication: Bedside nurse    Assessment Comment: pt demonstrated fair performance on all functional mobility, significantly limited by pain and fatigue; pt required extended time to move with breathing techniques required throughout; pt required minimal VC/TC throughout for sequencing and hand/foot placement; pt requires assist throughout functional mobility; pt would benefit from ongoing PT, and would benefit from MOD intensity PT following medical DC to maintain current functional mobility and to further promote strength improvements, endurance, pain management, and functional independence.    End of Session Patient Position: Bed, 3 rail up, Alarm on  IP OR SWING BED PT PLAN  Inpatient or Swing Bed: Inpatient  PT Plan  Treatment/Interventions: Bed mobility, Transfer training, Gait training, Stair training, Balance training,  Strengthening, Endurance training, Therapeutic exercise, Therapeutic activity  PT Plan: Ongoing PT  PT Frequency: 3 times per week  PT Discharge Recommendations: Moderate intensity level of continued care  Equipment Recommended upon Discharge:  (none)  PT Recommended Transfer Status: Assist x1  PT - OK to Discharge: Yes (per PT POC)    Subjective   General Visit Information:  General  Reason for Referral: 54 y.o. female s/p  cholecystectomy, laparoscopic on 4/18. Recent admission on 4/16 for abdominal pain.  Referred By: Radha Rodriguez MD  Past Medical History Relevant to Rehab: Medical History[1]  Family/Caregiver Present: No  Co-Treatment: OT  Co-Treatment Reason: To maximize pt safety, mobility and performance  Prior to Session Communication: Bedside nurse  Patient Position Received: Bed, 3 rail up (Seated at EOB)  Preferred Learning Style: auditory, visual, verbal  General Comment: Pt agreeable and cooperative to OT/PT eval, limited by pain and fatigue.    Home Living:  Home Living  Type of Home: House  Lives With: Alone  Home Adaptive Equipment: Walker rolling or standard, Wheelchair-manual (step stool at EOB)  Home Layout: One level, Laundry in basement  Alternate Level Stairs-Rails: Left  Alternate Level Stairs-Number of Steps: 12  Home Access: Stairs to enter with rails (via garage door)  Entrance Stairs-Rails: Left  Entrance Stairs-Number of Steps: 2  Bathroom Shower/Tub: Tub/shower unit  Bathroom Toilet: Handicapped height  Bathroom Equipment: None  Bathroom Accessibility: no concerns  Home Living Comments: pt states she has a 1/2 bath off of her room that she will utilize as needed, but mainly uses the main bathroom with the adaptive seating and tub/shwr unit; pt states she also has a very high bed, and uses a stool at EOB to use as needed    Prior Level of Function:  Prior Function Per Pt/Caregiver Report  Level of [place]: Independent with ADLs and functional transfers, Independent with homemaking  with ambulation  Receives Help From:  (Friends live nearby and father currently visiting pt.)  ADL Assistance: Independent  Homemaking Assistance: Independent  Ambulatory Assistance: Independent (No AD)  Prior Function Comments: (+) drives; (-) falls    Precautions:  Precautions  Medical Precautions: Fall precautions, Abdominal precautions     Objective   Pain:  Pain Assessment  Pain Assessment: 0-10  0-10 (Numeric) Pain Score: 9  Pain Type: Surgical pain  Pain Location: Abdomen  Pain Orientation: Lower, Mid, Upper  Pain Frequency: Constant/continuous  Pain Onset: Ongoing  Clinical Progression: Gradually worsening  Pain Interventions: Medication (See MAR), Repositioned, Emotional support  Response to Interventions: Increase in pain (increases with movement, especially towards right side; states it becomes 10/10 pain)    Cognition:  Cognition  Overall Cognitive Status: Within Functional Limits  Orientation Level: Oriented X4  Attention: Within Functional Limits  Memory: Within Funtional Limits    General Assessments:  General Observation  General Observation: pt demonstrated fair performance with bed mobility, transfers, and ambulation, significantly limited by pain and fatigue; pt requires minimal VC/TC throughout for sequencing and encouragement and breathing techniques to helkp facilitate participation    Activity Tolerance  Endurance: Decreased tolerance for upright activites, Tolerates less than 10 min exercise, no significant change in vital signs  Activity Tolerance Comments: limited by significiant pain and fatigue    Sensation  Sensation Comment: Appears intact    Strength  Strength Comments: pt demonstrates proper strength to perform functional mobility, unable to fully assess due to significant abd pain  Strength  Strength Comments: pt demonstrates proper strength to perform functional mobility, unable to fully assess due to significant abd pain    Coordination  Movements are Fluid and Coordinated:  Yes  Coordination Comment: Appears intact    Postural Control  Postural Control: Within Functional Limits    Static Sitting Balance  Static Sitting-Balance Support: Bilateral upper extremity supported, Feet supported  Static Sitting-Level of Assistance: Close supervision  Dynamic Sitting Balance  Dynamic Sitting-Balance Support: Bilateral upper extremity supported, Feet supported  Dynamic Sitting-Level of Assistance: Close supervision    Static Standing Balance  Static Standing-Balance Support: Bilateral upper extremity supported (FWW)  Static Standing-Level of Assistance: Contact guard  Dynamic Standing Balance  Dynamic Standing-Balance Support: Bilateral upper extremity supported (FWW)  Dynamic Standing-Level of Assistance: Contact guard    Functional Assessments:  Bed Mobility  Bed Mobility: Yes  Bed Mobility 1  Bed Mobility 1: Sitting to supine  Level of Assistance 1: Minimum assistance, Minimal verbal cues, Minimal tactile cues  Bed Mobility Comments 1: HOB elevated, minimal VC/TC and instruction for log roll technique, pt unable to tolerate attempting d/t pain. Increased time and effortful to complete. MinAx2 and utilized railing    Transfers  Transfer: Yes  Transfer 1  Transfer From 1: Bed to  Transfer to 1: Stand  Technique 1: Sit to stand, Stand to sit  Transfer Device 1: Walker, Gait belt  Transfer Level of Assistance 1: Minimum assistance, +2, Minimal verbal cues, Minimal tactile cues  Trials/Comments 1: Minimal VC/TC for hand placement and body positioning; pt demo lateral left lean during sit<>stand transfer, requiring minAx2. Inc time and effortful.    Ambulation/Gait Training  Ambulation/Gait Training Performed: Yes  Ambulation/Gait Training 1  Surface 1: Level tile  Device 1: Rolling walker  Gait Support Devices: Gait belt  Assistance 1: Contact guard, Minimal verbal cues, Minimal tactile cues (x2)  Quality of Gait 1: Diminished heel strike, Decreased step length  Comments/Distance (ft) 1: pt amb ~4  ft side stepping up to HOB; requiring CGAx2 and minimal VC/TC on foot and hand placement, requiring encouragement throughout and reminders to breathe    Stairs  Stairs: No    Extremity/Trunk Assessments:  RUE   RUE : Within Functional Limits (Grossly greater than or equal to 3/5 distally)  LUE   LUE: Within Functional Limits (Grossly greater than or equal to 3/5 distally)  RLE   RLE : Within Functional Limits (Grossly greater than or equal to 3/5 distally)  LLE   LLE : Within Functional Limits (Grossly greater than or equal to 3/5 distally)    Outcome Measures:  [facility] Basic Mobility  Turning from your back to your side while in a flat bed without using bedrails: A lot  Moving from lying on your back to sitting on the side of a flat bed without using bedrails: A lot  Moving to and from bed to chair (including a wheelchair): A lot  Standing up from a chair using your arms (e.g. wheelchair or bedside chair): A lot  To walk in hospital room: A little  Climbing 3-5 steps with railing: Total  Basic Mobility - Total Score: 12    Encounter Problems       Encounter Problems (Active)       Balance       STG - Maintains static and dynamic standing balance with LRAD and SBA       Start:  04/22/25    Expected End:  05/06/25            STG - Maintains static and dynamic sitting balance without LRAD and SBA, with no changes in pain while performing       Start:  04/22/25    Expected End:  05/06/25               Mobility       LTG - Patient will navigate 2 steps with rails/device and MinAx1       Start:  04/22/25    Expected End:  05/06/25            STG - Patient will ambulate ~100ft with LRAD and SBA       Start:  04/22/25    Expected End:  05/06/25               PT Transfers       STG - Patient will perform bed mobility using the log roll with HOB flat and LRAD with SBA       Start:  04/22/25    Expected End:  05/06/25            STG - Patient will transfer sit to and from stand with SBA and LRAD       Start:  04/22/25     Expected End:  05/06/25               Pain - Adult              Education Documentation  Precautions, taught by Annie Garcia PT at 4/22/2025 11:46 AM.  Learner: Patient  Readiness: Acceptance  Method: Explanation  Response: Needs Reinforcement    Body Mechanics, taught by Annie Garcia PT at 4/22/2025 11:46 AM.  Learner: Patient  Readiness: Acceptance  Method: Explanation  Response: Needs Reinforcement    Mobility Training, taught by Annie Garcia PT at 4/22/2025 11:46 AM.  Learner: Patient  Readiness: Acceptance  Method: Explanation  Response: Needs Reinforcement    Education Comments  No comments found.                 [1]   Past Medical History:  Diagnosis Date    Asthma     Diabetes mellitus (Multi)     patient states she used to be but she guesses so    Eczema 1971    GERD (gastroesophageal reflux disease) 2012    ARTUR (obstructive sleep apnea)     patient denies    Peptic ulceration 2015

## 2025-04-22 NOTE — PROGRESS NOTES
04/22/25 0949   Discharge Planning   Home or Post Acute Services None   Expected Discharge Disposition Home     Met with patient at bedside to discuss discharge plans.  PT/OT recommending MOD.  Patient is not interested in SNF placement at this time.  She thinks she will be able to move better once her pain is better controlled.  She was independent prior to admission.  Her father will be at home with her to help.  Told patient that if something changes and she feels like she can't go home alone we can look at SNF placement.  Anticipate discharge tomorrow if pain is better controlled.  Will continue to follow for discharge planning needs.

## 2025-04-22 NOTE — PROGRESS NOTES
"Yuni Jordan is a 54 y.o. female on day 3 of admission presenting with Acute cholecystitis.    Subjective   Patient is awake in bed this morning. She reports she is still in a lot of pain. Has not been eating or moving around much       Objective     Physical Exam  Constitutional:       Appearance: Normal appearance.   Cardiovascular:      Rate and Rhythm: Normal rate and regular rhythm.   Pulmonary:      Effort: Pulmonary effort is normal.      Comments: Non labored breathing on RA  Abdominal:      General: There is distension.      Palpations: Abdomen is soft.      Tenderness: There is abdominal tenderness.      Comments: Lap sites C/D/I, edges well approximated, covered in Dermabond.   Skin:     General: Skin is warm and dry.   Neurological:      General: No focal deficit present.      Mental Status: She is alert and oriented to person, place, and time. Mental status is at baseline.   Psychiatric:         Attention and Perception: Attention normal.         Mood and Affect: Mood normal.         Speech: Speech normal.         Behavior: Behavior normal.         Cognition and Memory: Cognition normal.         Last Recorded Vitals  Blood pressure (!) 151/98, pulse 83, temperature 36.8 °C (98.2 °F), temperature source Temporal, resp. rate 18, height 1.575 m (5' 2.01\"), weight 89.8 kg (197 lb 15.6 oz), SpO2 97%.  Intake/Output last 3 Shifts:  I/O last 3 completed shifts:  In: 100 (1.1 mL/kg) [IV Piggyback:100]  Out: - (0 mL/kg)   Weight: 89.8 kg     Relevant Results  Scheduled medications  Scheduled Medications[1]  Continuous medications  Continuous Medications[2]  PRN medications  PRN Medications[3]   Results for orders placed or performed during the hospital encounter of 04/18/25 (from the past 24 hours)   CBC and Auto Differential   Result Value Ref Range    WBC 14.3 (H) 4.4 - 11.3 x10*3/uL    nRBC 0.0 0.0 - 0.0 /100 WBCs    RBC 3.91 (L) 4.00 - 5.20 x10*6/uL    Hemoglobin 11.7 (L) 12.0 - 16.0 g/dL    Hematocrit " 35.9 (L) 36.0 - 46.0 %    MCV 92 80 - 100 fL    MCH 29.9 26.0 - 34.0 pg    MCHC 32.6 32.0 - 36.0 g/dL    RDW 13.7 11.5 - 14.5 %    Platelets 234 150 - 450 x10*3/uL    Neutrophils % 61.0 40.0 - 80.0 %    Immature Granulocytes %, Automated 0.9 0.0 - 0.9 %    Lymphocytes % 20.7 13.0 - 44.0 %    Monocytes % 13.0 2.0 - 10.0 %    Eosinophils % 4.0 0.0 - 6.0 %    Basophils % 0.4 0.0 - 2.0 %    Neutrophils Absolute 8.70 (H) 1.20 - 7.70 x10*3/uL    Immature Granulocytes Absolute, Automated 0.13 0.00 - 0.70 x10*3/uL    Lymphocytes Absolute 2.95 1.20 - 4.80 x10*3/uL    Monocytes Absolute 1.85 (H) 0.10 - 1.00 x10*3/uL    Eosinophils Absolute 0.57 0.00 - 0.70 x10*3/uL    Basophils Absolute 0.06 0.00 - 0.10 x10*3/uL   SST TOP   Result Value Ref Range    Extra Tube Hold for add-ons.       CT abdomen pelvis wo IV contrast   Final Result   1. Acute postsurgical change from cholecystectomy. Fluid collection   with few foci of gas within the surgical bed with minimal extension   along the anterior hepatic margin. Trace free intraperitoneal air is   seen along the anterior liver margin. Postsurgical change within the   abdominal wall without focal fluid collection.   2. Bibasilar atelectatic changes.   3. Mild inflammatory changes within the right lower quadrant in the   region of the appendix. Proximal appendix appears unremarkable while   the distal appendix is difficult to evaluate allowing for adjacent   small bowel loops and lack of contrast. Findings likely relate to   acute postsurgical changes within the abdomen. Serial exams   recommended.        MACRO:   None.        Signed by: Boyd Bill 4/21/2025 1:54 PM   Dictation workstation:   [id number]      CT abdomen pelvis wo IV contrast   Final Result   Severe acute cholecystitis with gallbladder dilatation, wall   thickening, pericholecystic fluid and gallstones. Hyperdense stone   seen in the cystic duct. Surgical consultation recommended.                       MACRO:   None.         Signed by: Dane Johns 4/18/2025 4:44 PM   Dictation workstation:   [id number]                      Assessment & Plan      Plan: POD 4 Laparoscopic Cholecystectomy Findings: Gangrenous  Acute cholecystitis   - Low fat diet  - PRN pain control  - Scheduled tylenol  - PRN antiemetic  - DVT ppx: SCDs/ Lovenox  - Miralax BID  - Encourage OOB  - Encourage IS    Discussed with Dr. Laura davis DC once she has better pain control      I spent 35 minutes in the professional and overall care of this patient.      Pineda Jack PA-C         [1] acetaminophen, 975 mg, oral, TID  atorvastatin, 20 mg, oral, Daily  ciprofloxacin, 400 mg, intravenous, q12h  enoxaparin, 40 mg, subcutaneous, q24h  guaiFENesin, 1,200 mg, oral, BID  losartan, 50 mg, oral, Daily  metroNIDAZOLE, 500 mg, intravenous, q8h  pantoprazole, 40 mg, oral, BID AC  polyethylene glycol, 17 g, oral, BID  sennosides-docusate sodium, 2 tablet, oral, BID  sucralfate, 1 g, oral, q6h JAMEL  [2]    [3] PRN medications: albuterol, morphine, ondansetron **OR** ondansetron, oxyCODONE

## 2025-04-23 LAB
ACID FAST STN SPEC: NORMAL
ANION GAP SERPL CALC-SCNC: 13 MMOL/L (ref 10–20)
BACTERIA FLD CULT: ABNORMAL
BASOPHILS # BLD AUTO: 0.05 X10*3/UL (ref 0–0.1)
BASOPHILS NFR BLD AUTO: 0.3 %
BUN SERPL-MCNC: 12 MG/DL (ref 6–23)
CALCIUM SERPL-MCNC: 8.3 MG/DL (ref 8.6–10.3)
CHLORIDE SERPL-SCNC: 105 MMOL/L (ref 98–107)
CO2 SERPL-SCNC: 24 MMOL/L (ref 21–32)
CREAT SERPL-MCNC: 0.66 MG/DL (ref 0.5–1.05)
EGFRCR SERPLBLD CKD-EPI 2021: >90 ML/MIN/1.73M*2
EOSINOPHIL # BLD AUTO: 0.69 X10*3/UL (ref 0–0.7)
EOSINOPHIL NFR BLD AUTO: 4.8 %
ERYTHROCYTE [DISTWIDTH] IN BLOOD BY AUTOMATED COUNT: 13.9 % (ref 11.5–14.5)
GLUCOSE SERPL-MCNC: 97 MG/DL (ref 74–99)
GRAM STN SPEC: ABNORMAL
GRAM STN SPEC: ABNORMAL
HCT VFR BLD AUTO: 39.2 % (ref 36–46)
HGB BLD-MCNC: 12.6 G/DL (ref 12–16)
IMM GRANULOCYTES # BLD AUTO: 0.2 X10*3/UL (ref 0–0.7)
IMM GRANULOCYTES NFR BLD AUTO: 1.4 % (ref 0–0.9)
LYMPHOCYTES # BLD AUTO: 2.65 X10*3/UL (ref 1.2–4.8)
LYMPHOCYTES NFR BLD AUTO: 18.4 %
MCH RBC QN AUTO: 29.9 PG (ref 26–34)
MCHC RBC AUTO-ENTMCNC: 32.1 G/DL (ref 32–36)
MCV RBC AUTO: 93 FL (ref 80–100)
MONOCYTES # BLD AUTO: 1.76 X10*3/UL (ref 0.1–1)
MONOCYTES NFR BLD AUTO: 12.2 %
MYCOBACTERIUM SPEC CULT: NORMAL
NEUTROPHILS # BLD AUTO: 9.09 X10*3/UL (ref 1.2–7.7)
NEUTROPHILS NFR BLD AUTO: 62.9 %
NRBC BLD-RTO: 0 /100 WBCS (ref 0–0)
PLATELET # BLD AUTO: 261 X10*3/UL (ref 150–450)
POTASSIUM SERPL-SCNC: 3.6 MMOL/L (ref 3.5–5.3)
RBC # BLD AUTO: 4.22 X10*6/UL (ref 4–5.2)
SODIUM SERPL-SCNC: 138 MMOL/L (ref 136–145)
WBC # BLD AUTO: 14.4 X10*3/UL (ref 4.4–11.3)

## 2025-04-23 PROCEDURE — 97530 THERAPEUTIC ACTIVITIES: CPT | Mod: GO

## 2025-04-23 PROCEDURE — 85025 COMPLETE CBC W/AUTO DIFF WBC: CPT | Performed by: HOSPITALIST

## 2025-04-23 PROCEDURE — 80048 BASIC METABOLIC PNL TOTAL CA: CPT | Performed by: HOSPITALIST

## 2025-04-23 PROCEDURE — 2500000004 HC RX 250 GENERAL PHARMACY W/ HCPCS (ALT 636 FOR OP/ED): Performed by: HOSPITALIST

## 2025-04-23 PROCEDURE — 99232 SBSQ HOSP IP/OBS MODERATE 35: CPT

## 2025-04-23 PROCEDURE — 2500000002 HC RX 250 W HCPCS SELF ADMINISTERED DRUGS (ALT 637 FOR MEDICARE OP, ALT 636 FOR OP/ED): Performed by: HOSPITALIST

## 2025-04-23 PROCEDURE — 36415 COLL VENOUS BLD VENIPUNCTURE: CPT | Performed by: HOSPITALIST

## 2025-04-23 PROCEDURE — 97116 GAIT TRAINING THERAPY: CPT | Mod: GP

## 2025-04-23 PROCEDURE — 2500000004 HC RX 250 GENERAL PHARMACY W/ HCPCS (ALT 636 FOR OP/ED): Performed by: INTERNAL MEDICINE

## 2025-04-23 PROCEDURE — 2500000001 HC RX 250 WO HCPCS SELF ADMINISTERED DRUGS (ALT 637 FOR MEDICARE OP): Performed by: INTERNAL MEDICINE

## 2025-04-23 PROCEDURE — 2500000004 HC RX 250 GENERAL PHARMACY W/ HCPCS (ALT 636 FOR OP/ED): Mod: JZ | Performed by: HOSPITALIST

## 2025-04-23 PROCEDURE — 2500000004 HC RX 250 GENERAL PHARMACY W/ HCPCS (ALT 636 FOR OP/ED): Mod: JZ

## 2025-04-23 PROCEDURE — 1100000001 HC PRIVATE ROOM DAILY

## 2025-04-23 PROCEDURE — 99233 SBSQ HOSP IP/OBS HIGH 50: CPT | Performed by: INTERNAL MEDICINE

## 2025-04-23 PROCEDURE — 2500000001 HC RX 250 WO HCPCS SELF ADMINISTERED DRUGS (ALT 637 FOR MEDICARE OP): Performed by: HOSPITALIST

## 2025-04-23 RX ORDER — LOSARTAN POTASSIUM 50 MG/1
25 TABLET ORAL DAILY
Qty: 15 TABLET | Refills: 0 | Status: CANCELLED | OUTPATIENT
Start: 2025-04-23 | End: 2025-05-23

## 2025-04-23 RX ADMIN — METRONIDAZOLE 500 MG: 500 INJECTION, SOLUTION INTRAVENOUS at 03:55

## 2025-04-23 RX ADMIN — SUCRALFATE ORAL SUSPENSION 1 G: 1 SUSPENSION ORAL at 20:32

## 2025-04-23 RX ADMIN — ACETAMINOPHEN 975 MG: 325 TABLET ORAL at 08:47

## 2025-04-23 RX ADMIN — SUCRALFATE ORAL SUSPENSION 1 G: 1 SUSPENSION ORAL at 11:08

## 2025-04-23 RX ADMIN — SENNOSIDES AND DOCUSATE SODIUM 2 TABLET: 50; 8.6 TABLET ORAL at 08:47

## 2025-04-23 RX ADMIN — OXYCODONE HYDROCHLORIDE 5 MG: 5 TABLET ORAL at 11:06

## 2025-04-23 RX ADMIN — GUAIFENESIN 1200 MG: 600 TABLET ORAL at 08:47

## 2025-04-23 RX ADMIN — PANTOPRAZOLE SODIUM 40 MG: 40 TABLET, DELAYED RELEASE ORAL at 05:07

## 2025-04-23 RX ADMIN — POLYETHYLENE GLYCOL 3350 17 G: 17 POWDER, FOR SOLUTION ORAL at 20:32

## 2025-04-23 RX ADMIN — LOSARTAN POTASSIUM 50 MG: 50 TABLET, FILM COATED ORAL at 08:48

## 2025-04-23 RX ADMIN — METRONIDAZOLE 500 MG: 500 INJECTION, SOLUTION INTRAVENOUS at 19:28

## 2025-04-23 RX ADMIN — SUCRALFATE ORAL SUSPENSION 1 G: 1 SUSPENSION ORAL at 00:03

## 2025-04-23 RX ADMIN — CIPROFLOXACIN 400 MG: 400 INJECTION, SOLUTION INTRAVENOUS at 20:39

## 2025-04-23 RX ADMIN — OXYCODONE HYDROCHLORIDE 5 MG: 5 TABLET ORAL at 20:32

## 2025-04-23 RX ADMIN — METRONIDAZOLE 500 MG: 500 INJECTION, SOLUTION INTRAVENOUS at 11:08

## 2025-04-23 RX ADMIN — ACETAMINOPHEN 975 MG: 325 TABLET ORAL at 20:32

## 2025-04-23 RX ADMIN — SUCRALFATE ORAL SUSPENSION 1 G: 1 SUSPENSION ORAL at 05:07

## 2025-04-23 RX ADMIN — ENOXAPARIN SODIUM 40 MG: 40 INJECTION SUBCUTANEOUS at 08:48

## 2025-04-23 RX ADMIN — ACETAMINOPHEN 975 MG: 325 TABLET ORAL at 16:09

## 2025-04-23 RX ADMIN — SENNOSIDES AND DOCUSATE SODIUM 2 TABLET: 50; 8.6 TABLET ORAL at 20:32

## 2025-04-23 RX ADMIN — MAGNESIUM HYDROXIDE 30 ML: 1200 LIQUID ORAL at 00:10

## 2025-04-23 RX ADMIN — OXYCODONE HYDROCHLORIDE 5 MG: 5 TABLET ORAL at 16:09

## 2025-04-23 RX ADMIN — OXYCODONE HYDROCHLORIDE 5 MG: 5 TABLET ORAL at 00:03

## 2025-04-23 RX ADMIN — ONDANSETRON HYDROCHLORIDE 4 MG: 4 TABLET, FILM COATED ORAL at 05:07

## 2025-04-23 RX ADMIN — ATORVASTATIN CALCIUM 20 MG: 20 TABLET, FILM COATED ORAL at 08:47

## 2025-04-23 RX ADMIN — CIPROFLOXACIN 400 MG: 400 INJECTION, SOLUTION INTRAVENOUS at 05:07

## 2025-04-23 RX ADMIN — PANTOPRAZOLE SODIUM 40 MG: 40 TABLET, DELAYED RELEASE ORAL at 16:09

## 2025-04-23 RX ADMIN — GUAIFENESIN 1200 MG: 600 TABLET ORAL at 20:32

## 2025-04-23 ASSESSMENT — COGNITIVE AND FUNCTIONAL STATUS - GENERAL
HELP NEEDED FOR BATHING: A LITTLE
DAILY ACTIVITIY SCORE: 24
CLIMB 3 TO 5 STEPS WITH RAILING: A LITTLE
TOILETING: A LITTLE
DRESSING REGULAR LOWER BODY CLOTHING: A LITTLE
MOVING TO AND FROM BED TO CHAIR: A LITTLE
DAILY ACTIVITIY SCORE: 20
DRESSING REGULAR UPPER BODY CLOTHING: A LITTLE
MOBILITY SCORE: 24
MOBILITY SCORE: 24
DAILY ACTIVITIY SCORE: 24
TURNING FROM BACK TO SIDE WHILE IN FLAT BAD: A LITTLE
MOVING FROM LYING ON BACK TO SITTING ON SIDE OF FLAT BED WITH BEDRAILS: A LITTLE
MOBILITY SCORE: 18
WALKING IN HOSPITAL ROOM: A LITTLE
STANDING UP FROM CHAIR USING ARMS: A LITTLE

## 2025-04-23 ASSESSMENT — PAIN - FUNCTIONAL ASSESSMENT
PAIN_FUNCTIONAL_ASSESSMENT: 0-10

## 2025-04-23 ASSESSMENT — PAIN DESCRIPTION - LOCATION
LOCATION: ABDOMEN

## 2025-04-23 ASSESSMENT — PAIN SCALES - GENERAL
PAINLEVEL_OUTOF10: 7
PAINLEVEL_OUTOF10: 8
PAINLEVEL_OUTOF10: 7
PAINLEVEL_OUTOF10: 7
PAINLEVEL_OUTOF10: 9
PAINLEVEL_OUTOF10: 4
PAINLEVEL_OUTOF10: 9
PAINLEVEL_OUTOF10: 8

## 2025-04-23 ASSESSMENT — PAIN DESCRIPTION - DESCRIPTORS: DESCRIPTORS: ACHING;JABBING

## 2025-04-23 NOTE — CARE PLAN
The patient's goals for the shift include  patient will report decrease pain    The clinical goals for the shift include pt will have decreased pain

## 2025-04-23 NOTE — PROGRESS NOTES
04/23/25 0929   Discharge Planning   Home or Post Acute Services None   Expected Discharge Disposition Home     Patient seen ambulating around unit with walker and assistance.  Reports pain remains the same.  Plan remains for patient to return home upon discharge.  Patient currently refusing SNF or HHC.  Will continue to follow for discharge planning needs.

## 2025-04-23 NOTE — PROGRESS NOTES
Physical Therapy    Physical Therapy Treatment    Patient Name: Yuni Jordan  MRN: [id number]  Today's Date: 4/23/2025  Time Calculation  Start Time: 1049  Stop Time: 1107  Time Calculation (min): 18 min     109/109-A    Assessment/Plan   PT Assessment  PT Assessment Results: Decreased strength, Impaired balance, Decreased mobility, Pain  Rehab Prognosis: Good  Barriers to Discharge Home: No anticipated barriers  Caregiver Assistance: Patient lives alone and/or does not have reliable caregiver assistance  Physical Needs: Stair navigation into home limited by function/safety, Ambulating household distances limited by function/safety, Intermittent mobility assistance needed, High falls risk due to function or environment  Evaluation/Treatment Tolerance: Patient tolerated treatment well, Patient limited by pain, Patient limited by fatigue  Medical Staff Made Aware: Yes  Strengths: Ability to acquire knowledge, Attitude of self, Premorbid level of function, Support of extended family/friends  Barriers to Participation: Comorbidities  End of Session Communication: Bedside nurse, Care Coordinator  Assessment Comment: PT tx completed. Pt making large steps in terms of forward progression towards independence with all functional mobility. Able to complete stair training and ambulation without device, denies need for additional therapy at this time. Anticipate good progression with continued mobility, pt familiar from hx of ~15 prior abdominal sx.  End of Session Patient Position: Up in chair, Alarm off, not on at start of session (LOW fall risk)  PT Plan  Inpatient/Swing Bed or Outpatient: Inpatient  PT Plan  Treatment/Interventions: Bed mobility, Transfer training, Gait training, Stair training, Balance training, Strengthening, Endurance training, Therapeutic exercise, Therapeutic activity  PT Plan: Other needs (Comment) (Tx and DC from caseload as per pt request and mobility safe for home navigation)  PT Eval Only  Reason: Safe to return home  PT Frequency: Other (Comment) (DC from caseload at this time)  PT Discharge Recommendations: Low intensity level of continued care  Equipment Recommended upon Discharge:  (Declines vending of equipment at this time)  PT Recommended Transfer Status: Stand by assist  PT - OK to Discharge: Yes (Per PT POC)    Current Problem:  Problem List[1]    General Visit Information:   PT  Visit  PT Received On: 04/23/25  Response to Previous Treatment: Patient with no complaints from previous session.  General  Prior to Session Communication: Bedside nurse  Patient Position Received: Alarm off, not on at start of session, Up in chair (Pt LOW falls risk)  General Comment: Pt agreeable to participation in therapy for possible home going and stair navigation. Reports pain yesterday was prohibiting movement, but with pain medication today, able to participate. Declines need for WW.  Subjective     Precautions:  Precautions  Medical Precautions: Fall precautions    Objective     Pain:  Pain Assessment  Pain Assessment: 0-10  0-10 (Numeric) Pain Score: 8  Pain Type: Surgical pain  Pain Location: Abdomen  Pain Descriptors: Aching, Jabbing  Pain Frequency: Constant/continuous  Pain Onset: Ongoing  Clinical Progression: Gradually worsening  Pain Interventions: Repositioned, Ambulation/increased activity, Distraction  Response to Interventions: Increase in pain, Provider notified (Nursing notified of pt pain, in to medicate at end of session)    Cognition:  Cognition  Overall Cognitive Status: Within Functional Limits    Postural Control:  Postural Control  Postural Control: Within Functional Limits    Activity Tolerance:  Activity Tolerance  Endurance: Tolerates 10 - 20 min exercise with multiple rests    Treatments:     Therapeutic Activity  Therapeutic Activity Performed: Yes  Therapeutic Activity 1: Increased education to pt and friend about purpose of therapy, need for stair training for home going, as  "well as possibility of AD vending. Pt reporting does not need AD, declines vending at this time. Open to education about difference between HHPT and SNF, confused as to difference between LTC \"Nursing Home\" and SNF. Increased education provided     Bed Mobility  Bed Mobility: No (Encountered up in chair, left in same)  Ambulation/Gait Training  Ambulation/Gait Training Performed: Yes  Ambulation/Gait Training 1  Surface 1: Level tile  Device 1: No device  Assistance 1: Distant supervision  Quality of Gait 1: Listing, Antalgic, Wide base of support, Diminished heel strike (No overt LOB, no hands on assist needed, no device used, pt declined use of WW. Occasional \"furniture surfing\" noted when near stable furniture. Shoulder hiking appears to exaggerate pain with antalgic appearing gait, forward flexed due to abdominal sx.)  Comments/Distance (ft) 1: 25' x2, 15' x2  Transfers  Transfer: Yes  Transfer 1  Technique 1: Sit to stand, Stand to sit  Transfer Level of Assistance 1: Distant supervision  Trials/Comments 1: No hands on assist needed, no use of device other than chair arms, no instability or LOB. Declined use of AD, occasional \"furniture surfing\"  Stairs  Stairs: Yes  Stairs  Rails 1: Left  Device 1: Railing  Assistance 1: Distant supervision  Comment/Number of Steps 1: 2 steps, cuing for non-reciprical pattern, pt familiar from prior sx, navigated at SUPERVISION, no LOB or hands on assist needed.       Outcome Measures:     [facility] Basic Mobility  Turning from your back to your side while in a flat bed without using bedrails: A little  Moving from lying on your back to sitting on the side of a flat bed without using bedrails: A little  Moving to and from bed to chair (including a wheelchair): A little  Standing up from a chair using your arms (e.g. wheelchair or bedside chair): A little  To walk in hospital room: A little  Climbing 3-5 steps with railing: A little  Basic Mobility - Total Score: 18    Education " Documentation  Precautions, taught by Hebert Garza, PT at 4/23/2025  4:46 PM.  Learner: Patient  Readiness: Acceptance  Method: Explanation, Demonstration  Response: Needs Reinforcement    Body Mechanics, taught by Hebert Garza, PT at 4/23/2025  4:46 PM.  Learner: Patient  Readiness: Acceptance  Method: Explanation, Demonstration  Response: Needs Reinforcement    Mobility Training, taught by Hebert Garza, PT at 4/23/2025  4:46 PM.  Learner: Patient  Readiness: Acceptance  Method: Explanation, Demonstration  Response: Needs Reinforcement    Education Comments  No comments found.           EDUCATION:     Encounter Problems       Encounter Problems (Active)       Pain - Adult             Encounter Problems (Resolved)       Balance       STG - Maintains static and dynamic standing balance with LRAD and SBA (Met)       Start:  04/22/25    Expected End:  05/06/25    Resolved:  04/23/25         STG - Maintains static and dynamic sitting balance without LRAD and SBA, with no changes in pain while performing (Met)       Start:  04/22/25    Expected End:  05/06/25    Resolved:  04/23/25            Mobility       LTG - Patient will navigate 2 steps with rails/device and MinAx1 (Met)       Start:  04/22/25    Expected End:  05/06/25    Resolved:  04/23/25         STG - Patient will ambulate ~100ft with LRAD and SBA (Met)       Start:  04/22/25    Expected End:  05/06/25    Resolved:  04/23/25            PT Transfers       STG - Patient will perform bed mobility using the log roll with HOB flat and LRAD with SBA (Met)       Start:  04/22/25    Expected End:  05/06/25    Resolved:  04/23/25         STG - Patient will transfer sit to and from stand with SBA and LRAD (Met)       Start:  04/22/25    Expected End:  05/06/25    Resolved:  04/23/25                     [1]   Patient Active Problem List  Diagnosis    Acid reflux    Anemia, iron deficiency    Anxiety    Bariatric surgery status    Hypertension,  uncontrolled    Carpal tunnel syndrome    Depression with anxiety    Diabetes mellitus type 2, diet-controlled    Diffuse varioliform gastritis    Hyperlipemia    Malnutrition following gastrointestinal surgery    Neuropathy    Obesity (BMI 30-39.9)    Pes planus    Posterior tibial tendonitis    Prehypertension    Vitamin D deficiency    Diabetes mellitus (Multi)    Fracture of base of fifth metatarsal bone of right foot at metaphyseal-diaphyseal junction, with routine healing, subsequent encounter    Peroneal tendinitis of right lower extremity    Acute sialoadenitis    Cigarette nicotine dependence without complication    Intestinal ulceration    Acute cholecystitis    Cholecystitis

## 2025-04-23 NOTE — CARE PLAN
The clinical goals for the shift include Patient will report effective pain management throughout shift

## 2025-04-23 NOTE — PROGRESS NOTES
Occupational Therapy    OT Treatment and DC    Patient Name: Yuni Jordan  MRN: [id number]  Department: [place] [id number]  Room: [address] [place] [address]  Today's Date: 4/23/2025  Time Calculation  Start Time: 0933  Stop Time: 0948  Time Calculation (min): 15 min        Assessment:  OT Assessment: Pt with increase improvement in mobility and ADL performance from initial OT evaluation on Tu 4/22. Pt reporting pain is limiting factor, however, is being controlled with RN. Pt reporting no acute OT concerns at this time and would like to discharge OT orders. RN and MD aware.  Prognosis: Good  Barriers to Discharge Home: Caregiver assistance, Physical needs  Caregiver Assistance: Patient lives alone and/or does not have reliable caregiver assistance  Physical Needs: Intermittent mobility assistance needed, Intermittent ADL assistance needed  Evaluation/Treatment Tolerance: Patient tolerated treatment well  Medical Staff Made Aware: Yes  End of Session Communication: Bedside nurse, Physician  End of Session Patient Position: Up in chair, Alarm off, not on at start of session (RN aware)  OT Assessment Results: Decreased ADL status, Decreased safe judgment during ADL, Decreased endurance, Decreased functional mobility, Decreased IADLs  Prognosis: Good  Barriers to Discharge: Decreased caregiver support  Evaluation/Treatment Tolerance: Patient tolerated treatment well  Medical Staff Made Aware: Yes  Plan:  Treatment Interventions: ADL retraining, Functional transfer training, UE strengthening/ROM, Endurance training, Patient/family training, Equipment evaluation/education, Compensatory technique education  OT Frequency: Other (Comment) (Discharging OT orders)  OT Discharge Recommendations: Low intensity level of continued care  OT Recommended Transfer Status: Assist of 1  OT - OK to Discharge: Yes (Per POC)  Treatment Interventions: ADL retraining, Functional transfer training, UE strengthening/ROM, Endurance training, Patient/family training,  Equipment evaluation/education, Compensatory technique education    Subjective   Previous Visit Info:  OT Last Visit  OT Received On: 04/23/25  General:  General  Reason for Referral: 54 y.o. female s/p  cholecystectomy, laparoscopic on 4/18. Recent admission on 4/16 for abdominal pain.  Referred By: Radha Rodriguez MD  Past Medical History Relevant to Rehab: Medical History[1]  Prior to Session Communication: Bedside nurse  Patient Position Received: Up in chair, Alarm off, not on at start of session  Preferred Learning Style: auditory, visual, verbal  General Comment: Pt intially agreeable to OT tx, reporting during session has no acute/home going OT needs and would like to discharge orders. RN and MD aware.  Precautions:  Medical Precautions: Fall precautions, Abdominal precautions            Pain:  Pain Assessment  Pain Assessment: 0-10  0-10 (Numeric) Pain Score: 7  Pain Type: Surgical pain  Pain Location: Abdomen  Pain Interventions: Repositioned, Ambulation/increased activity    Objective    Cognition:  Cognition  Orientation Level: Oriented X4  Attention: Within Functional Limits  Memory: Within Funtional Limits  Coordination:  Movements are Fluid and Coordinated: Yes  Activities of Daily Living: Grooming  Grooming Comments:  (Pt reported had completed earlier this date.)    UE Bathing  UE Bathing Comments:  (Pt reported had completed earlier this date.)    LE Bathing  LE Bathing Comments:  (Pt reported had completed earlier this date.)    UE Dressing  UE Dressing Comments:  (Pt reported had completed earlier this date.)    LE Dressing  LE Dressing: Yes  Pants Level of Assistance: Close supervision  Sock Level of Assistance:  (Declined to trial socks)  LE Dressing Where Assessed: Chair     Bed Mobility/Transfers: Bed Mobility  Bed Mobility: No    Transfers  Transfer: Yes  Transfer 1  Technique 1: Sit to stand, Stand to sit  Trials/Comments 1: Pt declined use of walker, able to stand SBA.      Functional  Mobility:  Functional Mobility  Functional Mobility Performed: Yes  Functional Mobility 1  Device 1: Rolling walker  Comments 1: Pt completed x mod household distance functional mobility with FWW, SBA. Reporting throughout no acute therapy needs identified.  Sitting Balance:  Static Sitting Balance  Static Sitting-Balance Support: Feet supported  Static Sitting-Level of Assistance: Modified Independent  Static Sitting-Comment/Number of Minutes: Chair  Dynamic Sitting Balance  Dynamic Sitting-Balance Support: Feet supported  Dynamic Sitting-Level of Assistance:  (MI-SBA)  Dynamic Sitting-Comments: Chair  Standing Balance:  Static Standing Balance  Static Standing-Balance Support: Bilateral upper extremity supported, No upper extremity supported  Static Standing-Level of Assistance:  (MI-SBA)  Static Standing-Comment/Number of Minutes: With and without FWW  Dynamic Standing Balance  Dynamic Standing-Balance Support: Bilateral upper extremity supported  Dynamic Standing-Level of Assistance: Close supervision  Dynamic Standing-Comments: FWW     Therapy/Activity: Therapeutic Activity  Therapeutic Activity Performed: Yes  Therapeutic Activity 1: Pt completed functional mobility to nearby linen/clothing cabinet and performed dynamic standing balance to obtain pants, SBA.    Outcome Measures:[facility] Daily Activity  Putting on and taking off regular lower body clothing: A little  Bathing (including washing, rinsing, drying): A little  Putting on and taking off regular upper body clothing: A little  Toileting, which includes using toilet, bedpan or urinal: A little  Taking care of personal grooming such as brushing teeth: None  Eating Meals: None  Daily Activity - Total Score: 20        Education Documentation  Body Mechanics, taught by Nallely Nelson OT at 4/23/2025 10:23 AM.  Learner: Patient  Readiness: Acceptance  Method: Explanation  Response: Verbalizes Understanding    Precautions, taught by Nallely Nelson OT at  4/23/2025 10:23 AM.  Learner: Patient  Readiness: Acceptance  Method: Explanation  Response: Verbalizes Understanding    ADL Training, taught by Nallely Nelson OT at 4/23/2025 10:23 AM.  Learner: Patient  Readiness: Acceptance  Method: Explanation  Response: Verbalizes Understanding    Education Comments  No comments found.        OP EDUCATION:       Goals:  Encounter Problems       Encounter Problems (Active)       ADLs       Patient will perform UB and LB sponge bathing with stand by assist level of assistance and use of adaptive techniques/equipment.       Start:  04/22/25    Expected End:  05/06/25            Patient with complete upper body dressing with modified independent level of assistance donning and doffing all UE clothes with PRN adaptive equipment while seated.       Start:  04/22/25    Expected End:  05/06/25            Patient with complete lower body dressing with stand by assist level of assistance donning and doffing all LE clothes  with PRN adaptive equipment while seated. (Progressing)       Start:  04/22/25    Expected End:  05/06/25            Patient will complete toileting including hygiene clothing management/hygiene with supervision level of assistance and raised toilet seat and grab bars.       Start:  04/22/25    Expected End:  05/06/25               MOBILITY       Patient will perform Functional mobility x Household distances/Community Distances with supervision level of assistance and least restrictive device in order to improve safety and functional mobility. (Progressing)       Start:  04/22/25    Expected End:  05/06/25               TRANSFERS       Patient will perform bed mobility supervision level of assistance and bed rails in order to improve safety and independence with mobility (Progressing)       Start:  04/22/25    Expected End:  05/06/25            Patient will complete sit to stand transfer with stand by assist level of assistance and least restrictive device in order  to improve safety and prepare for out of bed mobility. (Progressing)       Start:  04/22/25    Expected End:  05/06/25                                  [1]   Past Medical History:  Diagnosis Date    Asthma     Diabetes mellitus (Multi)     patient states she used to be but she guesses so    Eczema 1971    GERD (gastroesophageal reflux disease) 2012    ARTUR (obstructive sleep apnea)     patient denies    Peptic ulceration 2015

## 2025-04-23 NOTE — PROGRESS NOTES
Between 7AM-7PM please message me via Epic Secure Chat.  After 7PM please page [facility] on call.    [facility] Hospitalist Progress Note      Yuni Jordan    :  1970(54 y.o.)    MRN:  [id number]  Date: 25     Assessment and Plan:       :  Wbc unchanged from yesterday, she still endorses abd pain.... discussed with OR team who wants to continue to monitor on iv abx and possibly repeat CT abd/pelvis tomorrow or Friday if still no improvement in pain.     Hx GJ ulcer... cont home PPI BID, carafate. She has an OP f/up with GI next month.     Home regimen for chronic conditions  Dvt px with lovenox        :  Acute Cholecystitis  - Surgery consulted. Lap daria on  with Dr. Walsh  - CT  showed Fluid collection with few foci of gas within the surgical bed with minimal extension along the anterior hepatic margin -> expected post op finding per surgery team. WBC continues to down trend.  - Continue Cipro/Flagyl with end date   - adia tylenol, oxycodone prn; IV morphine ordered but limiting use as goal is home once pain controlled and will only have PO meds at home  - continue low fat diet  - OOB and ambulate as much as tolerated    Post op constipation  - adia miralax bid, senna bid  - milk of mag prn    Gastrojejunostomy ucler  - Seen on EGD 25. PPI BID. Add carafate qid  - op follow up with GI for repeat EGD around 6/15/25    HTN  - continue losartan    HLD  - on statin    DVT Prophylaxis: subcutaneous Lovenox    Disposition: awaiting improvement in abd pain    Subjective:      Interval History:   Vitals and chart notes from overnight reviewed.   No acute issues overnight.   Patient seen and evaluated at bedside.   Not using IV pain meds much but reports pain still poorly controlled. PO intake poor as she doesn't tolerate much of what's on the menu due to her ulcers.     Review of Systems:   Other than patient's chronic conditions and those complaints in the history  above, the rest of the 10 systems review were done and were negative.     Current medications:  Scheduled Meds:Scheduled Medications[1]  Continuous Infusions:Continuous Medications[2]  PRN Meds:PRN Medications[3]      Objective:     Heart Rate:  [74-95]   Temp:  [36.1 °C (97 °F)-36.5 °C (97.7 °F)]   Resp:  [18-20]   BP: (124-157)/(71-95)   SpO2:  [94 %-98 %]     Oxygen Dose: *4 L/min    Physical Exam  Vitals and nursing note reviewed.   HENT:      Mouth/Throat:      Mouth: Mucous membranes are moist.      Pharynx: Oropharynx is clear.   Cardiovascular:      Rate and Rhythm: Normal rate and regular rhythm.   Pulmonary:      Effort: Pulmonary effort is normal.   Abdominal:      General: There is no distension.      Palpations: Abdomen is soft.      Tenderness: There is abdominal tenderness.   Neurological:      Mental Status: She is alert and oriented to person, place, and time.         Labs:   Lab Results   Component Value Date     04/23/2025    K 3.6 04/23/2025     04/23/2025    CO2 24 04/23/2025    BUN 12 04/23/2025    CREATININE 0.66 04/23/2025    GLUCOSE 97 04/23/2025    CALCIUM 8.3 (L) 04/23/2025    PROT 6.3 (L) 04/21/2025    BILITOT 0.5 04/21/2025    ALKPHOS 90 04/21/2025    AST 21 04/21/2025    ALT 25 04/21/2025       Lab Results   Component Value Date    WBC 14.4 (H) 04/23/2025    HGB 12.6 04/23/2025    HCT 39.2 04/23/2025    MCV 93 04/23/2025     04/23/2025            [1] acetaminophen, 975 mg, oral, TID  atorvastatin, 20 mg, oral, Daily  ciprofloxacin, 400 mg, intravenous, q12h  enoxaparin, 40 mg, subcutaneous, q24h  guaiFENesin, 1,200 mg, oral, BID  losartan, 50 mg, oral, Daily  metroNIDAZOLE, 500 mg, intravenous, q8h  pantoprazole, 40 mg, oral, BID AC  polyethylene glycol, 17 g, oral, BID  sennosides-docusate sodium, 2 tablet, oral, BID  sucralfate, 1 g, oral, q6h [facility]     [2]    [3] PRN medications: albuterol, magnesium hydroxide, morphine, ondansetron **OR** ondansetron, oxyCODONE

## 2025-04-23 NOTE — PROGRESS NOTES
"Yuni Jordan is a 54 y.o. female on day 4 of admission presenting with Acute cholecystitis.    Subjective   Patient is awake walking around her room this morning. She is still in pain and says her heartburn is too bad when she is lying in bed.       Objective     Physical Exam  Constitutional:       Appearance: Normal appearance.   Cardiovascular:      Rate and Rhythm: Normal rate and regular rhythm.   Pulmonary:      Effort: Pulmonary effort is normal.      Comments: Non labored breathing on RA  Abdominal:      General: There is distension.      Palpations: Abdomen is soft.      Tenderness: There is abdominal tenderness.      Comments: Lap sites C/D/I, edges well approximated, covered in Dermabond.   Skin:     General: Skin is warm and dry.   Neurological:      General: No focal deficit present.      Mental Status: She is alert and oriented to person, place, and time. Mental status is at baseline.   Psychiatric:         Attention and Perception: Attention normal.         Mood and Affect: Mood normal.         Speech: Speech normal.         Behavior: Behavior normal.         Cognition and Memory: Cognition normal.         Last Recorded Vitals  Blood pressure 137/85, pulse 95, temperature 36.5 °C (97.7 °F), temperature source Temporal, resp. rate 20, height 1.575 m (5' 2.01\"), weight 89.8 kg (197 lb 15.6 oz), SpO2 95%.  Intake/Output last 3 Shifts:  I/O last 3 completed shifts:  In: 200 (2.2 mL/kg) [IV Piggyback:200]  Out: 3 (0 mL/kg) [Urine:3 (0 mL/kg/hr)]  Weight: 89.8 kg     Relevant Results  Scheduled medications  Scheduled Medications[1]  Continuous medications  Continuous Medications[2]  PRN medications  PRN Medications[3]   Results for orders placed or performed during the hospital encounter of 04/18/25 (from the past 24 hours)   CBC and Auto Differential   Result Value Ref Range    WBC 14.4 (H) 4.4 - 11.3 x10*3/uL    nRBC 0.0 0.0 - 0.0 /100 WBCs    RBC 4.22 4.00 - 5.20 x10*6/uL    Hemoglobin 12.6 12.0 - " 16.0 g/dL    Hematocrit 39.2 36.0 - 46.0 %    MCV 93 80 - 100 fL    MCH 29.9 26.0 - 34.0 pg    MCHC 32.1 32.0 - 36.0 g/dL    RDW 13.9 11.5 - 14.5 %    Platelets 261 150 - 450 x10*3/uL    Neutrophils % 62.9 40.0 - 80.0 %    Immature Granulocytes %, Automated 1.4 (H) 0.0 - 0.9 %    Lymphocytes % 18.4 13.0 - 44.0 %    Monocytes % 12.2 2.0 - 10.0 %    Eosinophils % 4.8 0.0 - 6.0 %    Basophils % 0.3 0.0 - 2.0 %    Neutrophils Absolute 9.09 (H) 1.20 - 7.70 x10*3/uL    Immature Granulocytes Absolute, Automated 0.20 0.00 - 0.70 x10*3/uL    Lymphocytes Absolute 2.65 1.20 - 4.80 x10*3/uL    Monocytes Absolute 1.76 (H) 0.10 - 1.00 x10*3/uL    Eosinophils Absolute 0.69 0.00 - 0.70 x10*3/uL    Basophils Absolute 0.05 0.00 - 0.10 x10*3/uL   Basic Metabolic Panel   Result Value Ref Range    Glucose 97 74 - 99 mg/dL    Sodium 138 136 - 145 mmol/L    Potassium 3.6 3.5 - 5.3 mmol/L    Chloride 105 98 - 107 mmol/L    Bicarbonate 24 21 - 32 mmol/L    Anion Gap 13 10 - 20 mmol/L    Urea Nitrogen 12 6 - 23 mg/dL    Creatinine 0.66 0.50 - 1.05 mg/dL    eGFR >90 >60 mL/min/1.73m*2    Calcium 8.3 (L) 8.6 - 10.3 mg/dL      CT abdomen pelvis wo IV contrast   Final Result   1. Acute postsurgical change from cholecystectomy. Fluid collection   with few foci of gas within the surgical bed with minimal extension   along the anterior hepatic margin. Trace free intraperitoneal air is   seen along the anterior liver margin. Postsurgical change within the   abdominal wall without focal fluid collection.   2. Bibasilar atelectatic changes.   3. Mild inflammatory changes within the right lower quadrant in the   region of the appendix. Proximal appendix appears unremarkable while   the distal appendix is difficult to evaluate allowing for adjacent   small bowel loops and lack of contrast. Findings likely relate to   acute postsurgical changes within the abdomen. Serial exams   recommended.        MACRO:   None.        Signed by: Boyd Bill 4/21/2025  1:54 PM   Dictation workstation:   [id number]      CT abdomen pelvis wo IV contrast   Final Result   Severe acute cholecystitis with gallbladder dilatation, wall   thickening, pericholecystic fluid and gallstones. Hyperdense stone   seen in the cystic duct. Surgical consultation recommended.                       MACRO:   None.        Signed by: Dane Johns 4/18/2025 4:44 PM   Dictation workstation:   [id number]                     Assessment & Plan      Plan: POD 5 Laparoscopic Cholecystectomy Findings: Gangrenous  Acute cholecystitis   - Low fat diet  - PRN pain control  - Scheduled tylenol  - PRN antiemetic  - DVT ppx: SCDs/ Lovenox  - Miralax BID  - Encourage OOB  - Encourage IS    Awaiting better pain control. Anticipate discharge when medically cleared      I spent 35 minutes in the professional and overall care of this patient.      Pineda Jack PA-C         [1] acetaminophen, 975 mg, oral, TID  atorvastatin, 20 mg, oral, Daily  ciprofloxacin, 400 mg, intravenous, q12h  enoxaparin, 40 mg, subcutaneous, q24h  guaiFENesin, 1,200 mg, oral, BID  losartan, 50 mg, oral, Daily  metroNIDAZOLE, 500 mg, intravenous, q8h  pantoprazole, 40 mg, oral, BID AC  polyethylene glycol, 17 g, oral, BID  sennosides-docusate sodium, 2 tablet, oral, BID  sucralfate, 1 g, oral, q6h JAMEL     [2]    [3] PRN medications: albuterol, magnesium hydroxide, morphine, ondansetron **OR** ondansetron, oxyCODONE

## 2025-04-24 ENCOUNTER — APPOINTMENT (OUTPATIENT)
Dept: CARDIOLOGY | Facility: HOSPITAL | Age: 55
DRG: 417 | End: 2025-04-24
Payer: COMMERCIAL

## 2025-04-24 ENCOUNTER — APPOINTMENT (OUTPATIENT)
Dept: RADIOLOGY | Facility: HOSPITAL | Age: 55
DRG: 417 | End: 2025-04-24
Payer: COMMERCIAL

## 2025-04-24 DIAGNOSIS — E11.9 DIABETES MELLITUS TYPE 2, DIET-CONTROLLED: Primary | ICD-10-CM

## 2025-04-24 LAB
ALBUMIN SERPL BCP-MCNC: 3.2 G/DL (ref 3.4–5)
ALP SERPL-CCNC: 99 U/L (ref 33–110)
ALT SERPL W P-5'-P-CCNC: 14 U/L (ref 7–45)
ANION GAP SERPL CALC-SCNC: 11 MMOL/L (ref 10–20)
AST SERPL W P-5'-P-CCNC: 14 U/L (ref 9–39)
BASOPHILS # BLD AUTO: 0.07 X10*3/UL (ref 0–0.1)
BASOPHILS NFR BLD AUTO: 0.5 %
BILIRUB SERPL-MCNC: 0.4 MG/DL (ref 0–1.2)
BUN SERPL-MCNC: 10 MG/DL (ref 6–23)
CALCIUM SERPL-MCNC: 8.1 MG/DL (ref 8.6–10.3)
CHLORIDE SERPL-SCNC: 106 MMOL/L (ref 98–107)
CO2 SERPL-SCNC: 23 MMOL/L (ref 21–32)
CREAT SERPL-MCNC: 0.56 MG/DL (ref 0.5–1.05)
EGFRCR SERPLBLD CKD-EPI 2021: >90 ML/MIN/1.73M*2
EOSINOPHIL # BLD AUTO: 0.71 X10*3/UL (ref 0–0.7)
EOSINOPHIL NFR BLD AUTO: 5 %
ERYTHROCYTE [DISTWIDTH] IN BLOOD BY AUTOMATED COUNT: 14 % (ref 11.5–14.5)
GLUCOSE SERPL-MCNC: 104 MG/DL (ref 74–99)
HCT VFR BLD AUTO: 34.3 % (ref 36–46)
HGB BLD-MCNC: 11.2 G/DL (ref 12–16)
IMM GRANULOCYTES # BLD AUTO: 0.21 X10*3/UL (ref 0–0.7)
IMM GRANULOCYTES NFR BLD AUTO: 1.5 % (ref 0–0.9)
LYMPHOCYTES # BLD AUTO: 2.96 X10*3/UL (ref 1.2–4.8)
LYMPHOCYTES NFR BLD AUTO: 20.8 %
MCH RBC QN AUTO: 29.9 PG (ref 26–34)
MCHC RBC AUTO-ENTMCNC: 32.7 G/DL (ref 32–36)
MCV RBC AUTO: 92 FL (ref 80–100)
MONOCYTES # BLD AUTO: 1.69 X10*3/UL (ref 0.1–1)
MONOCYTES NFR BLD AUTO: 11.9 %
NEUTROPHILS # BLD AUTO: 8.61 X10*3/UL (ref 1.2–7.7)
NEUTROPHILS NFR BLD AUTO: 60.3 %
NRBC BLD-RTO: 0 /100 WBCS (ref 0–0)
PLATELET # BLD AUTO: 277 X10*3/UL (ref 150–450)
POTASSIUM SERPL-SCNC: 3.4 MMOL/L (ref 3.5–5.3)
PROT SERPL-MCNC: 5.9 G/DL (ref 6.4–8.2)
RBC # BLD AUTO: 3.75 X10*6/UL (ref 4–5.2)
SODIUM SERPL-SCNC: 137 MMOL/L (ref 136–145)
WBC # BLD AUTO: 14.3 X10*3/UL (ref 4.4–11.3)

## 2025-04-24 PROCEDURE — 1100000001 HC PRIVATE ROOM DAILY

## 2025-04-24 PROCEDURE — 80053 COMPREHEN METABOLIC PANEL: CPT | Performed by: INTERNAL MEDICINE

## 2025-04-24 PROCEDURE — 2500000002 HC RX 250 W HCPCS SELF ADMINISTERED DRUGS (ALT 637 FOR MEDICARE OP, ALT 636 FOR OP/ED): Performed by: HOSPITALIST

## 2025-04-24 PROCEDURE — 2500000004 HC RX 250 GENERAL PHARMACY W/ HCPCS (ALT 636 FOR OP/ED): Mod: JZ | Performed by: HOSPITALIST

## 2025-04-24 PROCEDURE — 36415 COLL VENOUS BLD VENIPUNCTURE: CPT | Performed by: HOSPITALIST

## 2025-04-24 PROCEDURE — 74176 CT ABD & PELVIS W/O CONTRAST: CPT | Performed by: RADIOLOGY

## 2025-04-24 PROCEDURE — 2500000001 HC RX 250 WO HCPCS SELF ADMINISTERED DRUGS (ALT 637 FOR MEDICARE OP): Performed by: HOSPITALIST

## 2025-04-24 PROCEDURE — 99233 SBSQ HOSP IP/OBS HIGH 50: CPT | Performed by: INTERNAL MEDICINE

## 2025-04-24 PROCEDURE — 2500000004 HC RX 250 GENERAL PHARMACY W/ HCPCS (ALT 636 FOR OP/ED): Performed by: HOSPITALIST

## 2025-04-24 PROCEDURE — 93005 ELECTROCARDIOGRAM TRACING: CPT

## 2025-04-24 PROCEDURE — 2500000004 HC RX 250 GENERAL PHARMACY W/ HCPCS (ALT 636 FOR OP/ED): Mod: JZ

## 2025-04-24 PROCEDURE — 2500000002 HC RX 250 W HCPCS SELF ADMINISTERED DRUGS (ALT 637 FOR MEDICARE OP, ALT 636 FOR OP/ED): Performed by: INTERNAL MEDICINE

## 2025-04-24 PROCEDURE — 74176 CT ABD & PELVIS W/O CONTRAST: CPT

## 2025-04-24 PROCEDURE — 85025 COMPLETE CBC W/AUTO DIFF WBC: CPT | Performed by: HOSPITALIST

## 2025-04-24 PROCEDURE — 2500000001 HC RX 250 WO HCPCS SELF ADMINISTERED DRUGS (ALT 637 FOR MEDICARE OP): Performed by: INTERNAL MEDICINE

## 2025-04-24 RX ORDER — POTASSIUM CHLORIDE 20 MEQ/1
40 TABLET, EXTENDED RELEASE ORAL ONCE
Status: COMPLETED | OUTPATIENT
Start: 2025-04-24 | End: 2025-04-24

## 2025-04-24 RX ADMIN — OXYCODONE HYDROCHLORIDE 5 MG: 5 TABLET ORAL at 23:43

## 2025-04-24 RX ADMIN — SUCRALFATE ORAL SUSPENSION 1 G: 1 SUSPENSION ORAL at 17:19

## 2025-04-24 RX ADMIN — METRONIDAZOLE 500 MG: 500 INJECTION, SOLUTION INTRAVENOUS at 02:06

## 2025-04-24 RX ADMIN — METRONIDAZOLE 500 MG: 500 INJECTION, SOLUTION INTRAVENOUS at 17:19

## 2025-04-24 RX ADMIN — OXYCODONE HYDROCHLORIDE 5 MG: 5 TABLET ORAL at 10:36

## 2025-04-24 RX ADMIN — SUCRALFATE ORAL SUSPENSION 1 G: 1 SUSPENSION ORAL at 12:37

## 2025-04-24 RX ADMIN — CIPROFLOXACIN 400 MG: 400 INJECTION, SOLUTION INTRAVENOUS at 17:19

## 2025-04-24 RX ADMIN — GUAIFENESIN 1200 MG: 600 TABLET ORAL at 20:45

## 2025-04-24 RX ADMIN — SUCRALFATE ORAL SUSPENSION 1 G: 1 SUSPENSION ORAL at 00:50

## 2025-04-24 RX ADMIN — METRONIDAZOLE 500 MG: 500 INJECTION, SOLUTION INTRAVENOUS at 09:36

## 2025-04-24 RX ADMIN — OXYCODONE HYDROCHLORIDE 5 MG: 5 TABLET ORAL at 03:35

## 2025-04-24 RX ADMIN — SUCRALFATE ORAL SUSPENSION 1 G: 1 SUSPENSION ORAL at 09:25

## 2025-04-24 RX ADMIN — ENOXAPARIN SODIUM 40 MG: 40 INJECTION SUBCUTANEOUS at 09:35

## 2025-04-24 RX ADMIN — MAGNESIUM HYDROXIDE 30 ML: 1200 LIQUID ORAL at 06:28

## 2025-04-24 RX ADMIN — PANTOPRAZOLE SODIUM 40 MG: 40 TABLET, DELAYED RELEASE ORAL at 06:25

## 2025-04-24 RX ADMIN — OXYCODONE HYDROCHLORIDE 5 MG: 5 TABLET ORAL at 16:33

## 2025-04-24 RX ADMIN — SUCRALFATE ORAL SUSPENSION 1 G: 1 SUSPENSION ORAL at 23:43

## 2025-04-24 RX ADMIN — ATORVASTATIN CALCIUM 20 MG: 20 TABLET, FILM COATED ORAL at 09:24

## 2025-04-24 RX ADMIN — LOSARTAN POTASSIUM 50 MG: 50 TABLET, FILM COATED ORAL at 09:24

## 2025-04-24 RX ADMIN — CIPROFLOXACIN 400 MG: 400 INJECTION, SOLUTION INTRAVENOUS at 05:52

## 2025-04-24 RX ADMIN — POTASSIUM CHLORIDE 40 MEQ: 1500 TABLET, EXTENDED RELEASE ORAL at 09:35

## 2025-04-24 RX ADMIN — PANTOPRAZOLE SODIUM 40 MG: 40 TABLET, DELAYED RELEASE ORAL at 16:33

## 2025-04-24 ASSESSMENT — PAIN DESCRIPTION - LOCATION
LOCATION: ABDOMEN

## 2025-04-24 ASSESSMENT — PAIN - FUNCTIONAL ASSESSMENT
PAIN_FUNCTIONAL_ASSESSMENT: 0-10

## 2025-04-24 ASSESSMENT — PAIN SCALES - GENERAL
PAINLEVEL_OUTOF10: 0 - NO PAIN
PAINLEVEL_OUTOF10: 7
PAINLEVEL_OUTOF10: 0 - NO PAIN
PAINLEVEL_OUTOF10: 4
PAINLEVEL_OUTOF10: 5 - MODERATE PAIN
PAINLEVEL_OUTOF10: 0 - NO PAIN
PAINLEVEL_OUTOF10: 8
PAINLEVEL_OUTOF10: 8
PAINLEVEL_OUTOF10: 2
PAINLEVEL_OUTOF10: 8

## 2025-04-24 ASSESSMENT — COGNITIVE AND FUNCTIONAL STATUS - GENERAL
DAILY ACTIVITIY SCORE: 24
MOBILITY SCORE: 24

## 2025-04-24 ASSESSMENT — PAIN DESCRIPTION - ORIENTATION
ORIENTATION: RIGHT;LEFT;MID
ORIENTATION: LEFT;MID;UPPER

## 2025-04-24 ASSESSMENT — PAIN DESCRIPTION - DESCRIPTORS: DESCRIPTORS: ACHING;SHARP

## 2025-04-24 NOTE — CARE PLAN
The patient's goals for the shift include pain management    The clinical goals for the shift include remain free from injury and HDS      Problem: Pain - Adult  Goal: Verbalizes/displays adequate comfort level or baseline comfort level  Outcome: Progressing     Problem: Safety - Adult  Goal: Free from fall injury  Outcome: Progressing     Problem: Discharge Planning  Goal: Discharge to home or other facility with appropriate resources  Outcome: Progressing     Problem: Chronic Conditions and Co-morbidities  Goal: Patient's chronic conditions and co-morbidity symptoms are monitored and maintained or improved  Outcome: Progressing     Problem: Nutrition  Goal: Nutrient intake appropriate for maintaining nutritional needs  Outcome: Progressing     Problem: Diabetes  Goal: Achieve decreasing blood glucose levels by end of shift  Outcome: Progressing  Goal: Increase stability of blood glucose readings by end of shift  Outcome: Progressing  Goal: Decrease in ketones present in urine by end of shift  Outcome: Progressing  Goal: Maintain electrolyte levels within acceptable range throughout shift  Outcome: Progressing  Goal: Maintain glucose levels >70mg/dl to <250mg/dl throughout shift  Outcome: Progressing  Goal: No changes in neurological exam by end of shift  Outcome: Progressing  Goal: Learn about and adhere to nutrition recommendations by end of shift  Outcome: Progressing  Goal: Vital signs within normal range for age by end of shift  Outcome: Progressing  Goal: Increase self care and/or family involovement by end of shift  Outcome: Progressing  Goal: Receive DSME education by end of shift  Outcome: Progressing     Problem: Pain  Goal: Takes deep breaths with improved pain control throughout the shift  Outcome: Progressing  Goal: Turns in bed with improved pain control throughout the shift  Outcome: Progressing  Goal: Walks with improved pain control throughout the shift  Outcome: Progressing  Goal: Performs ADL's  with improved pain control throughout shift  Outcome: Progressing  Goal: Participates in PT with improved pain control throughout the shift  Outcome: Progressing  Goal: Free from opioid side effects throughout the shift  Outcome: Progressing  Goal: Free from acute confusion related to pain meds throughout the shift  Outcome: Progressing     Problem: Fall/Injury  Goal: Not fall by end of shift  Outcome: Progressing  Goal: Be free from injury by end of the shift  Outcome: Progressing  Goal: Verbalize understanding of personal risk factors for fall in the hospital  Outcome: Progressing  Goal: Verbalize understanding of risk factor reduction measures to prevent injury from fall in the home  Outcome: Progressing  Goal: Use assistive devices by end of the shift  Outcome: Progressing  Goal: Pace activities to prevent fatigue by end of the shift  Outcome: Progressing

## 2025-04-24 NOTE — PROGRESS NOTES
04/24/25 0645   Discharge Planning   Home or Post Acute Services None   Expected Discharge Disposition Home   Does the patient need discharge transport arranged? No   Stroke Family Assessment   Stroke Family Assessment Needed No   Intensity of Service   Intensity of Service 0-30 min     PLAN/BARRIER: pain control, if not improved possible CT   DISP: home  DME: none  O2: none  WOUNDS: surgical lap sites  HHC: none  ADOD: 1-3  Suzette Comer RN

## 2025-04-24 NOTE — DOCUMENTATION CLARIFICATION NOTE
"    PATIENT:               JESSIE GREGORIO  ACCT #:                  [id number]  MRN:                       [id number]  :                       1970  ADMIT DATE:       2025 3:42 PM  DISCH DATE:  RESPONDING PROVIDER #:        [id number]          PROVIDER RESPONSE TEXT:    I agree with dietician diagnosis of Severe Malnutrition on 2025    CDI QUERY TEXT:    Clarification    Instruction:    Based on your assessment of the patient and the clinical information, please provide the requested documentation by clicking on the appropriate radio button and enter any additional information if prompted.    Question: Please further clarify this patient nutritional status as    When answering this query, please exercise your independent professional judgment. The fact that a question is being asked, does not imply that any particular answer is desired or expected.    The patient's clinical indicators include:  Clinical Information: 53 y/o female admited since  with acute gangrenous cholecystitis.    Clinical Indicators:    2025 Nutrition consult, ELIZABETH Ag RDN, LD: \"Severe malnutrition related to chronic disease or condition Related to: cholecystitis As Evidenced by: Greater than 2% weight loss in 1 week and prolonged poor intake prior to hospital admit less than 50% of estimated energy needs for greater than 5 days\"    Treatment: Nutrition consult, Fat-modified diet, Ensure clear supplement, weight/intake/lab monitoring    Risk Factors: acute illness, surgery  Options provided:  -- I agree with dietician diagnosis of Severe Malnutrition on 2025  -- Other - I will add my own diagnosis  -- Refer to Clinical Documentation Reviewer    Query created by: Nayla Elizabeth on 2025 12:49 PM      Electronically signed by:  FAM SARAH MD 2025 3:53 PM          "

## 2025-04-24 NOTE — PROGRESS NOTES
Between 7AM-7PM please message me via Epic Secure Chat.  After 7PM please page Nocturnist on call.    [facility] Hospitalist Progress Note      Yuni Jordan    :  1970(54 y.o.)    MRN:  [id number]  Date: 25     Assessment and Plan:         :  Wbc still high, pain/tenderness remains.... repeat CT with enlarging fluid collection in GB fossa... cont iv abx, will discuss with OR team. May need IR.    Replace K.   Constipation better s/p large BM last night.    She's otherwise on RA, afebrile, and feels better today.       :  Wbc unchanged from yesterday, she still endorses abd pain.... discussed with OR team who wants to continue to monitor on iv abx and possibly repeat CT abd/pelvis tomorrow or Friday if still no improvement in pain.     Hx GJ ulcer... cont home PPI BID, carafate. She has an OP f/up with GI next month.     Home regimen for chronic conditions  Dvt px with lovenox        :  Acute Cholecystitis  - Surgery consulted. Lap daria on  with Dr. Walsh  - CT  showed Fluid collection with few foci of gas within the surgical bed with minimal extension along the anterior hepatic margin -> expected post op finding per surgery team. WBC continues to down trend.  - Continue Cipro/Flagyl with end date   - adia tylenol, oxycodone prn; IV morphine ordered but limiting use as goal is home once pain controlled and will only have PO meds at home  - continue low fat diet  - OOB and ambulate as much as tolerated    Post op constipation  - adia miralax bid, senna bid  - milk of mag prn    Gastrojejunostomy ucler  - Seen on EGD 25. PPI BID. Add carafate qid  - op follow up with GI for repeat EGD around 6/15/25    HTN  - continue losartan    HLD  - on statin    DVT Prophylaxis: subcutaneous Lovenox    Disposition: awaiting improvement in abd pain    Subjective:      Interval History:   Vitals and chart notes from overnight reviewed.   No acute issues overnight.   Patient  seen and evaluated at bedside.   Not using IV pain meds much but reports pain still poorly controlled. PO intake poor as she doesn't tolerate much of what's on the menu due to her ulcers.     Review of Systems:   Other than patient's chronic conditions and those complaints in the history above, the rest of the 10 systems review were done and were negative.     Current medications:  Scheduled Meds:Scheduled Medications[1]  Continuous Infusions:Continuous Medications[2]  PRN Meds:PRN Medications[3]      Objective:     Heart Rate:  [79-88]   Temp:  [36.1 °C (97 °F)-37.4 °C (99.3 °F)]   Resp:  [17-18]   BP: (121-150)/(61-80)   SpO2:  [92 %-99 %]     Oxygen Dose: *4 L/min    Physical Exam  Vitals and nursing note reviewed.   HENT:      Mouth/Throat:      Mouth: Mucous membranes are moist.      Pharynx: Oropharynx is clear.   Cardiovascular:      Rate and Rhythm: Normal rate and regular rhythm.   Pulmonary:      Effort: Pulmonary effort is normal.   Abdominal:      General: There is no distension.      Palpations: Abdomen is soft.      Tenderness: There is abdominal tenderness.   Neurological:      Mental Status: She is alert and oriented to person, place, and time.         Labs:   Lab Results   Component Value Date     04/24/2025    K 3.4 (L) 04/24/2025     04/24/2025    CO2 23 04/24/2025    BUN 10 04/24/2025    CREATININE 0.56 04/24/2025    GLUCOSE 104 (H) 04/24/2025    CALCIUM 8.1 (L) 04/24/2025    PROT 5.9 (L) 04/24/2025    BILITOT 0.4 04/24/2025    ALKPHOS 99 04/24/2025    AST 14 04/24/2025    ALT 14 04/24/2025       Lab Results   Component Value Date    WBC 14.3 (H) 04/24/2025    HGB 11.2 (L) 04/24/2025    HCT 34.3 (L) 04/24/2025    MCV 92 04/24/2025     04/24/2025            [1] acetaminophen, 975 mg, oral, TID  atorvastatin, 20 mg, oral, Daily  ciprofloxacin, 400 mg, intravenous, q12h  enoxaparin, 40 mg, subcutaneous, q24h  guaiFENesin, 1,200 mg, oral, BID  losartan, 50 mg, oral,  Daily  metroNIDAZOLE, 500 mg, intravenous, q8h  pantoprazole, 40 mg, oral, BID AC  polyethylene glycol, 17 g, oral, BID  sennosides-docusate sodium, 2 tablet, oral, BID  sucralfate, 1 g, oral, q6h JAMEL     [2]    [3] PRN medications: albuterol, magnesium hydroxide, morphine, ondansetron **OR** ondansetron, oxyCODONE

## 2025-04-24 NOTE — PROGRESS NOTES
"Yuni Jordan is a 54 y.o. female on day 5 of admission presenting with Acute cholecystitis.    Assessment/Plan   White count is still 14.  She is now a week out from surgery.  I do not know how well she is doing home given the amount of pain she is having.  Could consider repeating her CT scan today.  There was a amorphous collection in her gallbladder fossa.  We could see if this is organizing into an abscess.    Subjective   Still having abdominal pain but overall improved       Objective     Physical Exam  NAD  A&Ox3  Non icteric  CTA  RR  Abdomen soft still diffusely tender with even light palpation.  Extremities warm, well perfused     Last Recorded Vitals  Blood pressure 121/64, pulse 81, temperature 36.5 °C (97.7 °F), temperature source Temporal, resp. rate 18, height 1.575 m (5' 2.01\"), weight 89.8 kg (197 lb 15.6 oz), SpO2 98%.  Intake/Output last 3 Shifts:  I/O last 3 completed shifts:  In: 300 (3.3 mL/kg) [IV Piggyback:300]  Out: 3 (0 mL/kg) [Urine:3 (0 mL/kg/hr)]  Weight: 89.8 kg     Relevant Results    Scheduled medications  Scheduled Medications[1]  Continuous medications  Continuous Medications[2]  PRN medications  PRN Medications[3]    Results for orders placed or performed during the hospital encounter of 04/18/25 (from the past 24 hours)   CBC and Auto Differential   Result Value Ref Range    WBC 14.3 (H) 4.4 - 11.3 x10*3/uL    nRBC 0.0 0.0 - 0.0 /100 WBCs    RBC 3.75 (L) 4.00 - 5.20 x10*6/uL    Hemoglobin 11.2 (L) 12.0 - 16.0 g/dL    Hematocrit 34.3 (L) 36.0 - 46.0 %    MCV 92 80 - 100 fL    MCH 29.9 26.0 - 34.0 pg    MCHC 32.7 32.0 - 36.0 g/dL    RDW 14.0 11.5 - 14.5 %    Platelets 277 150 - 450 x10*3/uL    Neutrophils % 60.3 40.0 - 80.0 %    Immature Granulocytes %, Automated 1.5 (H) 0.0 - 0.9 %    Lymphocytes % 20.8 13.0 - 44.0 %    Monocytes % 11.9 2.0 - 10.0 %    Eosinophils % 5.0 0.0 - 6.0 %    Basophils % 0.5 0.0 - 2.0 %    Neutrophils Absolute 8.61 (H) 1.20 - 7.70 x10*3/uL    Immature " Granulocytes Absolute, Automated 0.21 0.00 - 0.70 x10*3/uL    Lymphocytes Absolute 2.96 1.20 - 4.80 x10*3/uL    Monocytes Absolute 1.69 (H) 0.10 - 1.00 x10*3/uL    Eosinophils Absolute 0.71 (H) 0.00 - 0.70 x10*3/uL    Basophils Absolute 0.07 0.00 - 0.10 x10*3/uL   Comprehensive Metabolic Panel   Result Value Ref Range    Glucose 104 (H) 74 - 99 mg/dL    Sodium 137 136 - 145 mmol/L    Potassium 3.4 (L) 3.5 - 5.3 mmol/L    Chloride 106 98 - 107 mmol/L    Bicarbonate 23 21 - 32 mmol/L    Anion Gap 11 10 - 20 mmol/L    Urea Nitrogen 10 6 - 23 mg/dL    Creatinine 0.56 0.50 - 1.05 mg/dL    eGFR >90 >60 mL/min/1.73m*2    Calcium 8.1 (L) 8.6 - 10.3 mg/dL    Albumin 3.2 (L) 3.4 - 5.0 g/dL    Alkaline Phosphatase 99 33 - 110 U/L    Total Protein 5.9 (L) 6.4 - 8.2 g/dL    AST 14 9 - 39 U/L    Bilirubin, Total 0.4 0.0 - 1.2 mg/dL    ALT 14 7 - 45 U/L           I spent 25 minutes in the professional and overall care of this patient.      Walter Sanchez MD           [1] acetaminophen, 975 mg, oral, TID  atorvastatin, 20 mg, oral, Daily  ciprofloxacin, 400 mg, intravenous, q12h  enoxaparin, 40 mg, subcutaneous, q24h  guaiFENesin, 1,200 mg, oral, BID  losartan, 50 mg, oral, Daily  metroNIDAZOLE, 500 mg, intravenous, q8h  pantoprazole, 40 mg, oral, BID AC  polyethylene glycol, 17 g, oral, BID  sennosides-docusate sodium, 2 tablet, oral, BID  sucralfate, 1 g, oral, q6h JAMEL    [2]    [3] PRN medications: albuterol, magnesium hydroxide, morphine, ondansetron **OR** ondansetron, oxyCODONE

## 2025-04-25 ENCOUNTER — APPOINTMENT (OUTPATIENT)
Dept: RADIOLOGY | Facility: HOSPITAL | Age: 55
DRG: 417 | End: 2025-04-25
Payer: COMMERCIAL

## 2025-04-25 LAB
ANION GAP SERPL CALC-SCNC: 12 MMOL/L (ref 10–20)
BASOPHILS # BLD AUTO: 0.06 X10*3/UL (ref 0–0.1)
BASOPHILS NFR BLD AUTO: 0.4 %
BUN SERPL-MCNC: 13 MG/DL (ref 6–23)
CALCIUM SERPL-MCNC: 8.2 MG/DL (ref 8.6–10.3)
CHLORIDE SERPL-SCNC: 109 MMOL/L (ref 98–107)
CO2 SERPL-SCNC: 21 MMOL/L (ref 21–32)
CREAT SERPL-MCNC: 0.65 MG/DL (ref 0.5–1.05)
EGFRCR SERPLBLD CKD-EPI 2021: >90 ML/MIN/1.73M*2
EOSINOPHIL # BLD AUTO: 0.65 X10*3/UL (ref 0–0.7)
EOSINOPHIL NFR BLD AUTO: 3.9 %
ERYTHROCYTE [DISTWIDTH] IN BLOOD BY AUTOMATED COUNT: 14.1 % (ref 11.5–14.5)
FUNGUS SPEC CULT: NORMAL
FUNGUS SPEC FUNGUS STN: NORMAL
GLUCOSE SERPL-MCNC: 98 MG/DL (ref 74–99)
HCT VFR BLD AUTO: 33.9 % (ref 36–46)
HGB BLD-MCNC: 10.9 G/DL (ref 12–16)
IMM GRANULOCYTES # BLD AUTO: 0.28 X10*3/UL (ref 0–0.7)
IMM GRANULOCYTES NFR BLD AUTO: 1.7 % (ref 0–0.9)
INR PPP: 1.6 (ref 0.9–1.1)
LABORATORY COMMENT REPORT: NORMAL
LYMPHOCYTES # BLD AUTO: 3.59 X10*3/UL (ref 1.2–4.8)
LYMPHOCYTES NFR BLD AUTO: 21.4 %
MCH RBC QN AUTO: 29.5 PG (ref 26–34)
MCHC RBC AUTO-ENTMCNC: 32.2 G/DL (ref 32–36)
MCV RBC AUTO: 92 FL (ref 80–100)
MONOCYTES # BLD AUTO: 1.73 X10*3/UL (ref 0.1–1)
MONOCYTES NFR BLD AUTO: 10.3 %
NEUTROPHILS # BLD AUTO: 10.5 X10*3/UL (ref 1.2–7.7)
NEUTROPHILS NFR BLD AUTO: 62.3 %
NRBC BLD-RTO: 0 /100 WBCS (ref 0–0)
PATH REPORT.FINAL DX SPEC: NORMAL
PATH REPORT.GROSS SPEC: NORMAL
PATH REPORT.MICROSCOPIC SPEC OTHER STN: NORMAL
PATH REPORT.RELEVANT HX SPEC: NORMAL
PATH REPORT.TOTAL CANCER: NORMAL
PLATELET # BLD AUTO: 326 X10*3/UL (ref 150–450)
POTASSIUM SERPL-SCNC: 3.9 MMOL/L (ref 3.5–5.3)
PROTHROMBIN TIME: 17.2 SECONDS (ref 9.8–12.4)
RBC # BLD AUTO: 3.69 X10*6/UL (ref 4–5.2)
SODIUM SERPL-SCNC: 138 MMOL/L (ref 136–145)
WBC # BLD AUTO: 16.8 X10*3/UL (ref 4.4–11.3)

## 2025-04-25 PROCEDURE — 2500000004 HC RX 250 GENERAL PHARMACY W/ HCPCS (ALT 636 FOR OP/ED): Mod: JZ | Performed by: INTERNAL MEDICINE

## 2025-04-25 PROCEDURE — 87070 CULTURE OTHR SPECIMN AEROBIC: CPT | Mod: AHULAB | Performed by: RADIOLOGY

## 2025-04-25 PROCEDURE — 99233 SBSQ HOSP IP/OBS HIGH 50: CPT | Performed by: INTERNAL MEDICINE

## 2025-04-25 PROCEDURE — 9420000001 HC RT PATIENT EDUCATION 5 MIN

## 2025-04-25 PROCEDURE — 2500000001 HC RX 250 WO HCPCS SELF ADMINISTERED DRUGS (ALT 637 FOR MEDICARE OP): Performed by: HOSPITALIST

## 2025-04-25 PROCEDURE — 99232 SBSQ HOSP IP/OBS MODERATE 35: CPT | Performed by: STUDENT IN AN ORGANIZED HEALTH CARE EDUCATION/TRAINING PROGRAM

## 2025-04-25 PROCEDURE — 0W9G30Z DRAINAGE OF PERITONEAL CAVITY WITH DRAINAGE DEVICE, PERCUTANEOUS APPROACH: ICD-10-PCS | Performed by: RADIOLOGY

## 2025-04-25 PROCEDURE — 2500000002 HC RX 250 W HCPCS SELF ADMINISTERED DRUGS (ALT 637 FOR MEDICARE OP, ALT 636 FOR OP/ED): Performed by: HOSPITALIST

## 2025-04-25 PROCEDURE — C1769 GUIDE WIRE: HCPCS

## 2025-04-25 PROCEDURE — 36415 COLL VENOUS BLD VENIPUNCTURE: CPT | Performed by: NURSE PRACTITIONER

## 2025-04-25 PROCEDURE — 2500000004 HC RX 250 GENERAL PHARMACY W/ HCPCS (ALT 636 FOR OP/ED): Performed by: INTERNAL MEDICINE

## 2025-04-25 PROCEDURE — 1100000001 HC PRIVATE ROOM DAILY

## 2025-04-25 PROCEDURE — 2500000004 HC RX 250 GENERAL PHARMACY W/ HCPCS (ALT 636 FOR OP/ED): Mod: JZ | Performed by: RADIOLOGY

## 2025-04-25 PROCEDURE — 80048 BASIC METABOLIC PNL TOTAL CA: CPT | Performed by: HOSPITALIST

## 2025-04-25 PROCEDURE — 85025 COMPLETE CBC W/AUTO DIFF WBC: CPT | Performed by: HOSPITALIST

## 2025-04-25 PROCEDURE — 99152 MOD SED SAME PHYS/QHP 5/>YRS: CPT

## 2025-04-25 PROCEDURE — 2720000007 HC OR 272 NO HCPCS

## 2025-04-25 PROCEDURE — 49405 IMAGE CATH FLUID COLXN VISC: CPT

## 2025-04-25 PROCEDURE — C1729 CATH, DRAINAGE: HCPCS

## 2025-04-25 PROCEDURE — 2500000001 HC RX 250 WO HCPCS SELF ADMINISTERED DRUGS (ALT 637 FOR MEDICARE OP): Performed by: INTERNAL MEDICINE

## 2025-04-25 PROCEDURE — 2500000004 HC RX 250 GENERAL PHARMACY W/ HCPCS (ALT 636 FOR OP/ED): Performed by: HOSPITALIST

## 2025-04-25 PROCEDURE — 49406 IMAGE CATH FLUID PERI/RETRO: CPT | Performed by: RADIOLOGY

## 2025-04-25 PROCEDURE — 2500000005 HC RX 250 GENERAL PHARMACY W/O HCPCS: Performed by: STUDENT IN AN ORGANIZED HEALTH CARE EDUCATION/TRAINING PROGRAM

## 2025-04-25 PROCEDURE — 85610 PROTHROMBIN TIME: CPT | Performed by: NURSE PRACTITIONER

## 2025-04-25 RX ORDER — FENTANYL CITRATE 50 UG/ML
INJECTION, SOLUTION INTRAMUSCULAR; INTRAVENOUS
Status: COMPLETED | OUTPATIENT
Start: 2025-04-25 | End: 2025-04-25

## 2025-04-25 RX ORDER — METHOCARBAMOL 100 MG/ML
500 INJECTION, SOLUTION INTRAMUSCULAR; INTRAVENOUS EVERY 8 HOURS PRN
Status: DISCONTINUED | OUTPATIENT
Start: 2025-04-25 | End: 2025-04-27

## 2025-04-25 RX ORDER — LIDOCAINE 560 MG/1
2 PATCH PERCUTANEOUS; TOPICAL; TRANSDERMAL DAILY
Status: DISCONTINUED | OUTPATIENT
Start: 2025-04-25 | End: 2025-04-28 | Stop reason: HOSPADM

## 2025-04-25 RX ORDER — LIDOCAINE HYDROCHLORIDE 10 MG/ML
INJECTION, SOLUTION EPIDURAL; INFILTRATION; INTRACAUDAL; PERINEURAL
Status: COMPLETED | OUTPATIENT
Start: 2025-04-25 | End: 2025-04-25

## 2025-04-25 RX ORDER — MIDAZOLAM HYDROCHLORIDE 1 MG/ML
INJECTION INTRAMUSCULAR; INTRAVENOUS
Status: COMPLETED | OUTPATIENT
Start: 2025-04-25 | End: 2025-04-25

## 2025-04-25 RX ADMIN — MORPHINE SULFATE 1 MG: 2 INJECTION, SOLUTION INTRAMUSCULAR; INTRAVENOUS at 19:46

## 2025-04-25 RX ADMIN — LIDOCAINE 4% 2 PATCH: 40 PATCH TOPICAL at 10:36

## 2025-04-25 RX ADMIN — CIPROFLOXACIN 400 MG: 400 INJECTION, SOLUTION INTRAVENOUS at 06:13

## 2025-04-25 RX ADMIN — SUCRALFATE ORAL SUSPENSION 1 G: 1 SUSPENSION ORAL at 18:12

## 2025-04-25 RX ADMIN — GUAIFENESIN 1200 MG: 600 TABLET ORAL at 20:35

## 2025-04-25 RX ADMIN — ACETAMINOPHEN 975 MG: 325 TABLET ORAL at 20:34

## 2025-04-25 RX ADMIN — METRONIDAZOLE 500 MG: 500 INJECTION, SOLUTION INTRAVENOUS at 02:33

## 2025-04-25 RX ADMIN — LOSARTAN POTASSIUM 50 MG: 50 TABLET, FILM COATED ORAL at 10:35

## 2025-04-25 RX ADMIN — PANTOPRAZOLE SODIUM 40 MG: 40 TABLET, DELAYED RELEASE ORAL at 15:00

## 2025-04-25 RX ADMIN — FENTANYL CITRATE 50 MCG: 50 INJECTION INTRAMUSCULAR; INTRAVENOUS at 14:17

## 2025-04-25 RX ADMIN — METRONIDAZOLE 500 MG: 500 INJECTION, SOLUTION INTRAVENOUS at 10:56

## 2025-04-25 RX ADMIN — CIPROFLOXACIN 400 MG: 400 INJECTION, SOLUTION INTRAVENOUS at 19:17

## 2025-04-25 RX ADMIN — MIDAZOLAM HYDROCHLORIDE 1 MG: 1 INJECTION, SOLUTION INTRAMUSCULAR; INTRAVENOUS at 14:18

## 2025-04-25 RX ADMIN — METRONIDAZOLE 500 MG: 500 INJECTION, SOLUTION INTRAVENOUS at 18:11

## 2025-04-25 RX ADMIN — ATORVASTATIN CALCIUM 20 MG: 20 TABLET, FILM COATED ORAL at 10:35

## 2025-04-25 RX ADMIN — LIDOCAINE HYDROCHLORIDE 10 ML: 10 INJECTION, SOLUTION EPIDURAL; INFILTRATION; INTRACAUDAL; PERINEURAL at 14:21

## 2025-04-25 ASSESSMENT — PAIN SCALES - GENERAL
PAINLEVEL_OUTOF10: 10 - WORST POSSIBLE PAIN
PAINLEVEL_OUTOF10: 6

## 2025-04-25 ASSESSMENT — PAIN - FUNCTIONAL ASSESSMENT: PAIN_FUNCTIONAL_ASSESSMENT: 0-10

## 2025-04-25 NOTE — PROGRESS NOTES
04/25/25 0903   Discharge Planning   Home or Post Acute Services None   Expected Discharge Disposition Home   Does the patient need discharge transport arranged? No     Patient continues to receive IV antibiotics. WBC increased from 14.3 to 16.8.  Plan for abscess drainage today.  Plan remains for patient to return home upon discharge.  Patient denies any need for HHC.  Will continue to follow for discharge planning needs.

## 2025-04-25 NOTE — PROGRESS NOTES
Yuni Jordan is a 54 y.o. female on day 6 of admission presenting with Acute cholecystitis.      Subjective   CRYSTAL. Still with significant RUQ abdominal pain that radiates to right side of back and right shoulder. Denies chest pain. Endorsing SOB. Avoidant of morphine       Objective     PE:  Constitutional: A&Ox3, calm and cooperative, NAD  Eyes: EOMI, clear sclera   Cardiovascular: Normal rate and regular rhythm  Respiratory/Thorax: diminished effort B/L  Gastrointestinal: abd soft, exquisitely tender diffusely. Hypoactive bowel sounds. Lap sites CDI with dermabond without signs of infection. soft  Genitourinary: Voiding independently   Extremities: No peripheral edema  Neurological: A&Ox3, No focal deficits   Psychological: Appropriate mood and behavior      Last Recorded Vitals  Vitals:    04/24/25 1955 04/24/25 2357 04/25/25 0345 04/25/25 0829   BP: 131/59 137/65 145/67 134/84   BP Location: Right arm Right arm Right arm Right arm   Patient Position: Lying Lying Lying Sitting   Pulse: 84 71 72 82   Resp: 18 18 18 18   Temp: 35.8 °C (96.5 °F) 36.8 °C (98.2 °F) 36.2 °C (97.2 °F) 36.4 °C (97.5 °F)   TempSrc: Temporal Temporal Temporal Temporal   SpO2: 99% 90% 94% 92%   Weight:       Height:             Relevant Results    Imaging:     .=== 09/16/24 ===    XR ANKLE 3+ VIEWS RIGHT    - Impression -  As above.    MACRO:  None.    Signed by: Hanane Motta 9/17/2024 10:13 PM  Dictation workstation:   [id number]   .=== 04/18/25 ===    CT ABDOMEN PELVIS WO IV CONTRAST    - Impression -  6 days postop from cholecystectomy with mild enlargement of fluid  collection within the gallbladder fossa as described. Enlarging  biloma versus abscess.    Stable surgical changes related to fundoplication, Nadja-en-Y gastric  bypass, and apparently also a previous gastric sleeve bariatric  surgery. Please correlate with surgical history.    Previous hysterectomy.    Stable band like atelectasis at the posterior left lung base.  Tiny  right pleural effusion. Combination of atelectasis and pneumonia in  the right lower lobe, mildly improved.    MACRO:  None    Signed by: Dane Sheldon 4/24/2025 2:43 PM  Dictation workstation:   [id number]         Lab Results:   Lab Results   Component Value Date    WBC 16.8 (H) 04/25/2025    HGB 10.9 (L) 04/25/2025    HCT 33.9 (L) 04/25/2025    MCV 92 04/25/2025     04/25/2025     Lab Results   Component Value Date    GLUCOSE 98 04/25/2025    CALCIUM 8.2 (L) 04/25/2025     04/25/2025    K 3.9 04/25/2025    CO2 21 04/25/2025     (H) 04/25/2025    BUN 13 04/25/2025    CREATININE 0.65 04/25/2025     Results from last 72 hours   Lab Units 04/24/25  0455   ALK PHOS U/L 99   BILIRUBIN TOTAL mg/dL 0.4   PROTEIN TOTAL g/dL 5.9*   ALT U/L 14   AST U/L 14   ALBUMIN g/dL 3.2*     Estimated Creatinine Clearance: 104.3 mL/min (by C-G formula based on SCr of 0.65 mg/dL).  CRP   Date/Time Value Ref Range Status   03/17/2021 10:00 AM 0.26 mg/dL Final     Comment:     REF VALUE  < 1.00           Assessment/Plan   Yuni Jordan is a 54 y.o. female on day 6 of admission presenting with Acute cholecystitis.    POD7 lap daria with Dr. Walsh  Intra-op findings: Gangrenous acute cholecystitis   Post-op course complicated by persistent pain and leukocytosis, CT yesterday shows developing fluid collection in GB fossa possibly biloma vs abscess  Plan:  - encourage IS, encourage ambulation  - diet: NPO  - IR eval for drain placement   - abx: cipro and flagyl   - DVT prophylaxis: SCD/lovenox held   - does not want PRN morphine, will add lidocaine patch and PRN IV robaxin   - endorsing diarrhea, stop miralax and joan-colace     Will continue to follow    I spent 35 minutes in the professional and overall care of this patient.         Edel Leal PA-C

## 2025-04-25 NOTE — POST-PROCEDURE NOTE
Interventional Radiology Brief Postprocedure Note    Attending: Antonio Hoff MD     Diagnosis: Post-op fluid collection GB fossa    Description of procedure: US guided drainage of GB fossa fluid collection; 8 FR drain to suction, bilious fluid sent for culture.    Anesthesia:  MAC Moderate    Complications: None    Estimated Blood Loss: none    Medications  As of 04/25/25 1437      morphine injection 4 mg (mg) Total dose:  4 mg Dosing weight:  89.8      Date/Time Rate/Dose/Volume Action       04/18/25  1641 4 mg Given               ondansetron (Zofran) injection 4 mg (mg) Total dose:  4 mg Dosing weight:  89.8      Date/Time Rate/Dose/Volume Action       04/18/25  1641 4 mg Given               ondansetron (Zofran) injection 4 mg (mg) Total dose:  Cannot be calculated* Dosing weight:  89.8   *Administration dose not documented     Date/Time Rate/Dose/Volume Action       04/19/25  1350 *Not included in total See Alternative     04/22/25  1823 *Not included in total See Alternative     04/23/25  0507 *Not included in total See Alternative               pantoprazole (Protonix) injection 40 mg (mg) Total dose:  40 mg Dosing weight:  89.8      Date/Time Rate/Dose/Volume Action       04/18/25  1805 *Not included in total MAR Hold      2257 *Not included in total MAR Unhold     04/19/25  0808 40 mg Given               pantoprazole (Protonix) injection 40 mg (mg) Total dose:  80 mg Dosing weight:  89.8      Date/Time Rate/Dose/Volume Action       04/19/25  1742 40 mg Given     04/20/25  0611 40 mg Given               metroNIDAZOLE (Flagyl) 500 mg in sodium chloride (iso)  mL (mg) Total dose:  500 mg Dosing weight:  89.8      Date/Time Rate/Dose/Volume Action       04/18/25  1710 500 mg (over 60 min) New Bag               metroNIDAZOLE (Flagyl) 500 mg in sodium chloride (iso)  mL (mg) Total dose:  3,000 mg* Dosing weight:  89.8   *From user-documented volume     Date/Time Rate/Dose/Volume Action       04/18/25   1805 *Not included in total MAR Hold      2257 *Not included in total MAR Unhold     04/19/25  0000 0 mL       0045 500 mg (over 60 min) New Bag      0211 100 mL Stopped      0843 500 mg (over 60 min) New Bag      1015  (over 60 min) Stopped      1815 500 mg (over 60 min) New Bag      1932  (over 60 min) Stopped     04/20/25  0032 500 mg (over 60 min) New Bag      0138  (over 60 min) Stopped      0611 300 mL       1200 500 mg (over 60 min) New Bag      1300  (over 60 min) Stopped      1741 500 mg (over 60 min) New Bag      1952  (over 60 min) Stopped     04/21/25  0014 500 mg (over 60 min) New Bag      0122  (over 60 min) Stopped      1002 500 mg (over 60 min) New Bag      1203  (over 60 min) Stopped      1700 *500 mg (over 60 min) Missed      1746 500 mg (over 60 min) New Bag      1900  (over 60 min) Stopped     04/22/25  0129 500 mg (over 60 min) New Bag      0229 100 mL Stopped      0954 500 mg (over 60 min) New Bag      1058  (over 60 min) Stopped      1824 500 mg (over 60 min) New Bag      2024 100 mL Stopped     04/23/25  0355 500 mg (over 60 min) New Bag      0458  (over 60 min) Stopped      1108 500 mg (over 60 min) New Bag      1208  (over 60 min) Stopped      1928 500 mg (over 60 min) New Bag      2029  (over 60 min) Stopped     04/24/25  0206 500 mg (over 60 min) New Bag      0319  (over 60 min) Stopped      0936 500 mg (over 60 min) New Bag      1048  (over 60 min) Stopped      1719 500 mg (over 60 min) New Bag      1856  (over 60 min) Stopped     04/25/25  0233 500 mg (over 60 min) New Bag      0350  (over 60 min) Stopped      1056 500 mg (over 60 min) New Bag      1157  (over 60 min) Stopped               oxygen (O2) therapy (L/min) Total volume:  Not documented* Dosing weight:  89.8   *Total volume has not been documented. View each administration to see the amount administered.     Date/Time Rate/Dose/Volume Action       04/18/25  1942 6 L/min Start      1948 6 L/min Start      1950 4 L/min Rate  Change Medical Gas      2000 4 L/min Rate Verify Medical Gas               HYDROmorphone (Dilaudid) injection 0.5 mg (mg) Total dose:  1 mg Dosing weight:  89.8      Date/Time Rate/Dose/Volume Action       04/18/25 2003 0.5 mg Given      2058 0.5 mg Given               atorvastatin (Lipitor) tablet 20 mg (mg) Total dose:  140 mg      Date/Time Rate/Dose/Volume Action       04/19/25  0838 20 mg Given     04/20/25  0956 20 mg Given     04/21/25  0947 20 mg Given     04/22/25  0953 20 mg Given     04/23/25  0847 20 mg Given     04/24/25  0924 20 mg Given     04/25/25  1035 20 mg Given               pantoprazole (ProtoNix) EC tablet 40 mg (mg) Total dose:  Cannot be calculated* Dosing weight:  89.8   *Administration dose not documented     Date/Time Rate/Dose/Volume Action       04/18/25  2348 *Not included in total Held by provider     04/19/25  0700 *Not included in total Automatically Held      1600 *Not included in total Automatically Held     04/20/25  0700 *Not included in total Automatically Held      0849 *Not included in total Unheld by provider               pantoprazole (ProtoNix) EC tablet 40 mg (mg) Total dose:  400 mg* Dosing weight:  89.8   *Administration not included in total     Date/Time Rate/Dose/Volume Action       04/20/25  1026 40 mg Given      1741 40 mg Given     04/21/25  0607 40 mg Given      1503 40 mg Given     04/22/25  0607 40 mg Given      1708 40 mg Given     04/23/25  0507 40 mg Given      1609 40 mg Given     04/24/25  0625 40 mg Given      1633 40 mg Given     04/25/25  0616 *40 mg Missed               acetaminophen (Tylenol) tablet 650 mg (mg) Total dose:  650 mg Dosing weight:  89.8      Date/Time Rate/Dose/Volume Action       04/19/25  1351 650 mg Given               acetaminophen (Tylenol) tablet 975 mg (mg) Total dose:  4,875 mg* Dosing weight:  89.8   *Administration not included in total     Date/Time Rate/Dose/Volume Action       04/22/25  1258 975 mg Given      2004 975 mg  Given     04/23/25  0847 975 mg Given      1609 975 mg Given      2032 975 mg Given     04/24/25  0924 *975 mg Missed      1634 *975 mg Missed      2045 *975 mg Missed     04/25/25  1017 *975 mg Missed               acetaminophen (Tylenol) oral liquid 650 mg (mg) Total dose:  Cannot be calculated* Dosing weight:  89.8   *Administration dose not documented     Date/Time Rate/Dose/Volume Action       04/19/25  1351 *Not included in total See Alternative               acetaminophen (Tylenol) suppository 650 mg (mg) Total dose:  Cannot be calculated* Dosing weight:  89.8   *Administration dose not documented     Date/Time Rate/Dose/Volume Action       04/19/25  1351 *Not included in total See Alternative               polyethylene glycol (Glycolax, Miralax) packet 17 g (g) Total dose:  68 g Dosing weight:  89.8      Date/Time Rate/Dose/Volume Action       04/21/25 2016 17 g Given     04/22/25  1258 17 g Given      2013 17 g Given     04/23/25  0848 *17 g Missed      2032 17 g Given     04/24/25  0929 *17 g Missed      2047 *17 g Missed     04/25/25  0950 *17 g Missed               docusate sodium (Colace) capsule 100 mg (mg) Total dose:  300 mg* Dosing weight:  89.8   *Administration not included in total     Date/Time Rate/Dose/Volume Action       04/19/25  0044 *100 mg Missed      0838 100 mg Given      2030 100 mg Given     04/20/25  0956 100 mg Given               losartan (Cozaar) tablet 50 mg (mg) Total dose:  350 mg Dosing weight:  89.8      Date/Time Rate/Dose/Volume Action       04/19/25  0838 50 mg Given     04/20/25  0956 50 mg Given     04/21/25  0947 50 mg Given     04/22/25  0953 50 mg Given     04/23/25  0848 50 mg Given     04/24/25  0924 50 mg Given     04/25/25  1035 50 mg Given               ondansetron (Zofran) tablet 4 mg (mg) Total dose:  12 mg Dosing weight:  89.8      Date/Time Rate/Dose/Volume Action       04/19/25  1350 4 mg Given     04/22/25  1823 4 mg Given     04/23/25  0507 4 mg Given                ciprofloxacin (Cipro) 400 mg in dextrose 5%  mL (mL/hr) Total dose:  2,000 mg* Dosing weight:  89.8   *From user-documented volume     Date/Time Rate/Dose/Volume Action       04/18/25 1805 *Not included in total MAR Hold      1900 *Not included in total Automatically Held      2257 *Not included in total MAR Unhold     04/19/25  0000 0 mL       0604 400 mg - 200 mL/hr (over 60 min) New Bag      0738  (over 60 min) Stopped      1934 400 mg - 200 mL/hr (over 60 min) New Bag      2041  (over 60 min) Stopped      2042 400 mL      04/20/25  0611 400 mg - 200 mL/hr (over 60 min) New Bag      0700 200 mL       0727  (over 60 min) Stopped      1941 400 mg - 200 mL/hr (over 60 min) New Bag      2103  (over 60 min) Stopped     04/21/25  0607 400 mg - 200 mL/hr (over 60 min) New Bag      0842  (over 60 min) Stopped      1902 400 mg - 200 mL/hr (over 60 min) New Bag      2000  (over 60 min) Stopped     04/22/25  0607 400 mg - 200 mL/hr (over 60 min) New Bag      0622  (over 60 min) Stopped      1823 400 mg - 200 mL/hr (over 60 min) New Bag      1934  (over 60 min) Stopped     04/23/25  0507 400 mg - 200 mL/hr (over 60 min) New Bag      0607  (over 60 min) Stopped      2039 400 mg - 200 mL/hr (over 60 min) New Bag      2153 200 mL Stopped     04/24/25  0552 400 mg - 200 mL/hr (over 60 min) New Bag      0728 200 mL Stopped      1719 400 mg - 200 mL/hr (over 60 min) New Bag      1930  (over 60 min) Stopped     04/25/25  0613 400 mg - 200 mL/hr (over 60 min) New Bag      0757  (over 60 min) Stopped               morphine injection 1 mg (mg) Total dose:  10 mg Dosing weight:  89.8      Date/Time Rate/Dose/Volume Action       04/18/25 1805 *Not included in total MAR Hold      2257 *Not included in total MAR Unhold      2306 1 mg Given     04/19/25  0349 1 mg Given      0808 1 mg Given      1448 1 mg Given      1938 1 mg Given     04/20/25  0032 1 mg Given      0610 1 mg Given      1607 1 mg Given      2125 1 mg  Given     04/21/25  0324 1 mg Given               oxyCODONE (Roxicodone) immediate release tablet 5 mg (mg) Total dose:  90 mg* Dosing weight:  89.8   *Administration not included in total     Date/Time Rate/Dose/Volume Action       04/18/25  1805 *Not included in total MAR Hold      2257 *Not included in total MAR Unhold     04/19/25  0123 5 mg Given      0842 *5 mg Missed      0951 5 mg Given     04/20/25  1026 5 mg Given     04/21/25  0946 5 mg Given      1503 5 mg Given      1928 5 mg Given     04/22/25  0022 5 mg Given      0423 5 mg Given      1016 5 mg Given      1454 5 mg Given     04/23/25  0003 5 mg Given      1106 5 mg Given      1609 5 mg Given      2032 5 mg Given     04/24/25  0335 5 mg Given      1036 5 mg Given      1633 5 mg Given      2343 5 mg Given     04/25/25  1038 *5 mg Missed               labetaloL (Normodyne,Trandate) injection 10 mg (mg) Total dose:  10 mg Dosing weight:  89.8      Date/Time Rate/Dose/Volume Action       04/18/25  1954 10 mg Given               insulin lispro injection 4 Units (Units) Total dose:  4 Units Dosing weight:  89.8      Date/Time Rate/Dose/Volume Action       04/19/25  0045 4 Units Given               enoxaparin (Lovenox) syringe 40 mg (mg) Total dose:  240 mg* Dosing weight:  89.8   *Administration not included in total     Date/Time Rate/Dose/Volume Action       04/19/25  1152 40 mg Given     04/20/25  1025 40 mg Given     04/21/25  1001 40 mg Given     04/22/25  0953 40 mg Given     04/23/25  0848 40 mg Given     04/24/25  0935 40 mg Given      1720 *Not included in total Held by provider     04/25/25  0945 *40 mg Missed               guaiFENesin (Mucinex) 12 hr tablet 1,200 mg (mg) Total dose:  12,000 mg* Dosing weight:  89.8   *Administration not included in total     Date/Time Rate/Dose/Volume Action       04/19/25  1815 1,200 mg Given     04/20/25  0956 1,200 mg Given      2005 1,200 mg Given     04/21/25  0947 1,200 mg Given      2016 1,200 mg Given      04/22/25  0953 1,200 mg Given      2004 1,200 mg Given     04/23/25  0847 1,200 mg Given      2032 1,200 mg Given     04/24/25  0928 *1,200 mg Missed      2045 1,200 mg Given     04/25/25  1031 *1,200 mg Missed               potassium chloride CR (Klor-Con M20) ER tablet 40 mEq (mEq) Total dose:  80 mEq Dosing weight:  89.8      Date/Time Rate/Dose/Volume Action       04/20/25  1026 40 mEq Given     04/24/25  0935 40 mEq Given               sucralfate (Carafate) suspension 1 g (g) Total dose:  19 g* Dosing weight:  89.8   *Administration not included in total     Date/Time Rate/Dose/Volume Action       04/20/25  1451 1 g Given      1741 1 g Given     04/21/25  0014 1 g Given      0607 1 g Given      1222 1 g Given      1753 1 g Given     04/22/25  0008 1 g Given      0607 1 g Given      1232 1 g Given      1823 1 g Given     04/23/25  0003 1 g Given      0507 1 g Given      1108 1 g Given      2032 1 g Given     04/24/25  0050 1 g Given      0925 1 g Given      1237 1 g Given      1719 1 g Given      2343 1 g Given     04/25/25  0607 *1 g Missed      1336 *1 g Missed               sennosides-docusate sodium (Eugenie-Colace) 8.6-50 mg per tablet 2 tablet (tablet) Total dose:  16 tablet* Dosing weight:  89.8   *Administration not included in total     Date/Time Rate/Dose/Volume Action       04/20/25  1451 2 tablet Given      2005 2 tablet Given     04/21/25  0947 2 tablet Given      2017 2 tablet Given     04/22/25  0953 2 tablet Given      2004 2 tablet Given     04/23/25  0847 2 tablet Given      2032 2 tablet Given     04/24/25  0929 *2 tablet Missed      2047 *2 tablet Missed     04/25/25  0950 *2 tablet Missed               magnesium hydroxide (Milk of Magnesia) 400 mg/5 mL suspension 30 mL (mL) Total volume:  60 mL Dosing weight:  89.8      Date/Time Rate/Dose/Volume Action       04/23/25  0010 30 mL Given     04/24/25  0628 30 mL Given               lidocaine 4 % patch 2 patch (patch) Total dose:  2 patch Dosing  weight:  89.8      Date/Time Rate/Dose/Volume Action       04/25/25  1036 2 patch (over 720 min) Medication Applied               fentaNYL PF (Sublimaze) injection (mcg) Total dose:  50 mcg      Date/Time Rate/Dose/Volume Action       04/25/25  1417 50 mcg Given               midazolam (Versed) injection (mg) Total dose:  1 mg      Date/Time Rate/Dose/Volume Action       04/25/25  1418 1 mg Given               lidocaine PF (Xylocaine) 10 mg/mL (1 %) injection (mL) Total volume:  10 mL      Date/Time Rate/Dose/Volume Action       04/25/25  1421 10 mL Given                   No specimens collected      See detailed result report with images in PACS.    The patient tolerated the procedure well without incident or complication and is in stable condition.

## 2025-04-25 NOTE — CONSULTS
INFECTIOUS DISEASE INPATIENT INITIAL CONSULTATION    Referred By: Yoni Farrell    Reason For Consult:  GB fossa abscess which is enlarging, rising wbc, s/p lap daria with gangrenous GB.    HPI:  This is a 54 y.o. female with PMH of obesity, hx gastric bypass who presented with abd pain on 4/18/25 and found to have acute cholecystitis.    She went to OR on 4/18/25 for lap daria - noted to have gangrenous GB which was resected. Now with worsening leukocytosis and ongoing RUQ pain. CT with GB fossa fluid collection concerning for abscess or biloma.      Allergies:  Aspirin, Iodides, Iodinated contrast media, Iodine, Nut - unspecified, Penicillins, and Shellfish containing products     Vitals (Last 24 Hours):  Heart Rate:  [68-84]   Temp:  [35.8 °C (96.5 °F)-36.8 °C (98.2 °F)]   Resp:  [18]   BP: (125-155)/(54-84)   SpO2:  [90 %-99 %]      PHYSICAL EXAM:  Gen - NAD  Heart - RRR  Lungs - clear bilaterally, no wh  Abd - tender in the RUQ, lap sites look OK, BS present  Skin - no rash    MEDS:  Current Medications[1]     LABS:  Lab Results   Component Value Date    WBC 16.8 (H) 04/25/2025    HGB 10.9 (L) 04/25/2025    HCT 33.9 (L) 04/25/2025    MCV 92 04/25/2025     04/25/2025      Results from last 72 hours   Lab Units 04/25/25  0510   SODIUM mmol/L 138   POTASSIUM mmol/L 3.9   CHLORIDE mmol/L 109*   CO2 mmol/L 21   BUN mg/dL 13   CREATININE mg/dL 0.65   GLUCOSE mg/dL 98   CALCIUM mg/dL 8.2*   ANION GAP mmol/L 12   EGFR mL/min/1.73m*2 >90     Results from last 72 hours   Lab Units 04/24/25  0455   ALK PHOS U/L 99   BILIRUBIN TOTAL mg/dL 0.4   PROTEIN TOTAL g/dL 5.9*   ALT U/L 14   AST U/L 14   ALBUMIN g/dL 3.2*     Estimated Creatinine Clearance: 104.3 mL/min (by C-G formula based on SCr of 0.65 mg/dL).      IMAGING:  CT A/P 4/24  IMPRESSION:  6 days postop from cholecystectomy with mild enlargement of fluid  collection within the gallbladder fossa as described. Enlarging  biloma versus abscess.    Stable  surgical changes related to fundoplication, Nadja-en-Y gastric  bypass, and apparently also a previous gastric sleeve bariatric  surgery. Please correlate with surgical history.    Previous hysterectomy.    Stable band like atelectasis at the posterior left lung base. Tiny  right pleural effusion. Combination of atelectasis and pneumonia in  the right lower lobe, mildly improved.    ASSESSMENT/PLAN:    Acute Cholecystitis s/p lap daria 4/28  GB Fossa Fluid Collection  Penicillin Allergy - limits abx options    Suspicious for abscess in the RUQ post-op now. IR consultation pending for drainage.     I will continue on IV Cipro/Flagyl. Grew Clostridium perfringens from bile during OR which is normal mendy for the area. Nothing resistant noted.    Monitoring for adverse effects of abx such as rash/itching/diarrhea.    Will follow peripherally over the weekend. Please call me with questions. Thanks!    Ac Fuentes MD  [facility]  Office #[phone]           [1]   Current Facility-Administered Medications:     acetaminophen (Tylenol) tablet 975 mg, 975 mg, oral, TID, 975 mg at 04/23/25 2032 **OR** [DISCONTINUED] acetaminophen (Tylenol) oral liquid 650 mg, 650 mg, oral, q4h PRN **OR** [DISCONTINUED] acetaminophen (Tylenol) suppository 650 mg, 650 mg, rectal, q4h PRN, Arthur REDDY Salomone, DO    albuterol 2.5 mg /3 mL (0.083 %) nebulizer solution 2.5 mg, 2.5 mg, nebulization, q2h PRN, Deep Gutierrez, DO    atorvastatin (Lipitor) tablet 20 mg, 20 mg, oral, Daily, Arthur MAUREEN Salomone, DO, 20 mg at 04/25/25 1035    ciprofloxacin (Cipro) 400 mg in dextrose 5%  mL, 400 mg, intravenous, q12h, Ac Fuentes MD, Stopped at 04/25/25 5495    [Held by provider] enoxaparin (Lovenox) syringe 40 mg, 40 mg, subcutaneous, q24h, VINCENT Werner-CNP, 40 mg at 04/24/25 0935    guaiFENesin (Mucinex) 12 hr tablet 1,200 mg, 1,200 mg, oral, BID, Deep Gutierrez, DO, 1,200 mg at 04/24/25 2045    lidocaine 4 % patch 2 patch, 2  patch, transdermal, Daily, Edel Leal PA-C, 2 patch at 04/25/25 1036    losartan (Cozaar) tablet 50 mg, 50 mg, oral, Daily, Arthur REDDY Salomone, DO, 50 mg at 04/25/25 1035    magnesium hydroxide (Milk of Magnesia) 400 mg/5 mL suspension 30 mL, 30 mL, oral, Daily PRN, Deep Gutierrez, DO, 30 mL at 04/24/25 0628    methocarbamol (Robaxin) injection 500 mg, 500 mg, intravenous, q8h PRN, Edel Leal PA-C    metroNIDAZOLE (Flagyl) 500 mg in sodium chloride (iso)  mL, 500 mg, intravenous, q8h, Ac Fuentes MD, Stopped at 04/25/25 1157    morphine injection 1 mg, 1 mg, intravenous, q4h PRN, Arthur Grangere, DO, 1 mg at 04/21/25 0324    ondansetron (Zofran) tablet 4 mg, 4 mg, oral, q8h PRN, 4 mg at 04/23/25 0507 **OR** ondansetron (Zofran) injection 4 mg, 4 mg, intravenous, q8h PRN, Arthur REDDY Salomone, DO    oxyCODONE (Roxicodone) immediate release tablet 5 mg, 5 mg, oral, q4h PRN, Arthur REDDY Salomone, DO, 5 mg at 04/24/25 2343    pantoprazole (ProtoNix) EC tablet 40 mg, 40 mg, oral, BID AC, Deep Gutierrez, DO, 40 mg at 04/24/25 1633    sucralfate (Carafate) suspension 1 g, 1 g, oral, q6h JAMEL, Deep Gutierrez, DO, 1 g at 04/24/25 2343

## 2025-04-25 NOTE — CARE PLAN
The patient's goals for the shift include      The clinical goals for the shift include pain control    Over the shift, the patient did not make progress toward the following goals. Barriers to progression include cholecystis post lap cholecystocolotomy . Recommendations to address these barriers include pain management and ambulation.

## 2025-04-25 NOTE — CONSULTS
Consults    Reason For Consult  GB fossa abscess    History Of Present Illness  Yuni Jordan is a 54 y.o. female presenting with RUQ pain and decreased appetite. She had a recent admission with N/V, and underwent an upper endoscopy where she was found to have a gastric ulcer. Re-admitted with severe right upper quadrant abdominal pain associated with nausea. Workup includes a CT scan that shows severe acute cholecystitis, cholelithiasis. S/p laparoscopic cholecystectomy 4/18/25 with findings of gangrenous acute cholecystitis. Delayed postop recovery, with increasing abdominal pain and leukocytosis. Repeat CT concerning for abscess in the gb fossa. IR consulted for drainage. Endorses chills, denies fever. Abd pain improved with topical lidoderm.      Other significant PMH includes morbid obesity and multiple previous abdominal operations to include Nadja-en-Y gastric bypass, asthma, DM.        Past Medical History  She has a past medical history of Asthma, Diabetes mellitus (Multi), Eczema (1971), GERD (gastroesophageal reflux disease) (2012), ARTRU (obstructive sleep apnea), and Peptic ulceration (2015).    Surgical History  She has a past surgical history that includes Hysterectomy (02/12/2004); Hernia repair; Colonoscopy (04/14/2021); Ovarian cyst removal; Myomectomy; Bariatric Surgery (12/09/2024); Esophagogastroduodenoscopy; Hiatal hernia repair (04/05/2016); Bariatric Surgery (09/19/2017); and Soft tissue mass excision (10/19/2011).     Social History  She reports that she has been smoking cigars. She has never used smokeless tobacco. She reports that she does not currently use alcohol. She reports that she does not use drugs.    Family History  Family History[1]     Allergies  Aspirin, Iodides, Iodinated contrast media, Iodine, Nut - unspecified, Penicillins, and Shellfish containing products    MEDS:  Current Medications[2]    Review of Systems  History obtained from chart review and the patient  General  ROS: positive for  - chills, fatigue, and malaise  Respiratory ROS: no cough, shortness of breath, or wheezing  Cardiovascular ROS: no chest pain or dyspnea on exertion  Gastrointestinal ROS: positive for - abdominal pain and appetite loss  Genitourinary ROS: no dysuria, trouble voiding, or hematuria     Last Recorded Vitals  /67 (BP Location: Right arm, Patient Position: Lying)   Pulse 72   Temp 36.2 °C (97.2 °F) (Temporal)   Resp 18   Wt 89.8 kg (197 lb 15.6 oz)   SpO2 94%      Physical Exam  Orientation: oriented to person, place, time, and general circumstances  HEENT: normocephalic, atraumatic  Pulm: normal  Cardiac: Regular rate and rhythm  GI:  TTP RUQ, BS +  Pulses: peripheral pulses symmetrical    Relevant Results    LABS:  Lab Results   Component Value Date    WBC 16.8 (H) 04/25/2025    HGB 10.9 (L) 04/25/2025    HCT 33.9 (L) 04/25/2025    MCV 92 04/25/2025     04/25/2025      Results from last 72 hours   Lab Units 04/25/25  0510   SODIUM mmol/L 138   POTASSIUM mmol/L 3.9   CHLORIDE mmol/L 109*   CO2 mmol/L 21   BUN mg/dL 13   CREATININE mg/dL 0.65   GLUCOSE mg/dL 98   CALCIUM mg/dL 8.2*   ANION GAP mmol/L 12   EGFR mL/min/1.73m*2 >90     Results from last 72 hours   Lab Units 04/24/25  0455   ALK PHOS U/L 99   BILIRUBIN TOTAL mg/dL 0.4   PROTEIN TOTAL g/dL 5.9*   ALT U/L 14   AST U/L 14   ALBUMIN g/dL 3.2*     Results from last 72 hours   Lab Units 04/25/25  0640   INR  1.6*       MICRO:  Susceptibility data from last 14 days.  Collected Specimen Info Organism Ampicillin/Sulbactam Ceftriaxone Clindamycin Meropenem Penicillin   04/18/25 Fluid from BILE FLUID Clostridium perfringens  S  S  S  S  S       IMAGING:   CT abdomen pelvis wo IV contrast   Final Result   6 days postop from cholecystectomy with mild enlargement of fluid   collection within the gallbladder fossa as described. Enlarging   biloma versus abscess.        Stable surgical changes related to fundoplication, Nadja-en-Y  gastric   bypass, and apparently also a previous gastric sleeve bariatric   surgery. Please correlate with surgical history.        Previous hysterectomy.        Stable band like atelectasis at the posterior left lung base. Tiny   right pleural effusion. Combination of atelectasis and pneumonia in   the right lower lobe, mildly improved.        MACRO:   None        Signed by: Dane Sheldon 4/24/2025 2:43 PM   Dictation workstation:   [id number]      CT abdomen pelvis wo IV contrast   Final Result   1. Acute postsurgical change from cholecystectomy. Fluid collection   with few foci of gas within the surgical bed with minimal extension   along the anterior hepatic margin. Trace free intraperitoneal air is   seen along the anterior liver margin. Postsurgical change within the   abdominal wall without focal fluid collection.   2. Bibasilar atelectatic changes.   3. Mild inflammatory changes within the right lower quadrant in the   region of the appendix. Proximal appendix appears unremarkable while   the distal appendix is difficult to evaluate allowing for adjacent   small bowel loops and lack of contrast. Findings likely relate to   acute postsurgical changes within the abdomen. Serial exams   recommended.        MACRO:   None.        Signed by: Boyd Bill 4/21/2025 1:54 PM   Dictation workstation:   [id number]      CT abdomen pelvis wo IV contrast   Final Result   Severe acute cholecystitis with gallbladder dilatation, wall   thickening, pericholecystic fluid and gallstones. Hyperdense stone   seen in the cystic duct. Surgical consultation recommended.                       MACRO:   None.        Signed by: Dane Johns 4/18/2025 4:44 PM   Dictation workstation:   [id number]      US guided abscess fluid collection drainage    (Results Pending)        Assessment/Plan     This is a 54 year old female admitted with RUQ pain and decreased appetite secondary to acute cholecystitis. Recent admission with N/V, and  underwent an upper endoscopy where she was found to have a gastric ulcer.     Workup in the ED includes a CT scan that shows severe acute cholecystitis, cholelithiasis. S/p Percutaneous cholecystectomy 4/18/25 with findings of gangrenous acute cholecystitis. Delayed postop recovery, with increasing abdominal pain and leukocytosis. WBC 16.8 today.     Repeat CT completed 4/24 was personally reviewed and discussed with IR attending. Imaging is significant for a 6cm fluid collection in the gb fossa with new finding of small locules of air concerning for abscess. Collection appears amenable to US guided drainage.     Treatment options were discussed with the patient. Risks of a percutaneous drain insertion were reviewed including but not limited to bleeding, infection, or damage to surrounding structures were reviewed. Benefits of the procedure and alternatives to treatment were also reviewed. Patient verbalizes understanding and wishes to proceed. They will further discuss with IR attending prior to procedure.     Plan for US guided drainage of gb fossa fluid collection today.   Continue NPO.   Lovenox held.     Mallampati III  ASA 2    CONCHITA Gagnon                 [1]   Family History  Problem Relation Name Age of Onset    Hypertension Mother Erin Jordan     Cancer Mother Erin Jordan     Diabetes Mother Erin Jordan     Asthma Mother Erin Jordan     Heart failure Mother Erin Jordan     Stroke Mother Erin Jordan     Hypertension Father Juan M Jordan     Heart failure Father Juan M Jordan     Asthma Father Juan M Jordan     Heart disease Father Juan M Jordan     Breast cancer Mother's Sister  77   [2]   Current Facility-Administered Medications:     acetaminophen (Tylenol) tablet 975 mg, 975 mg, oral, TID, 975 mg at 04/23/25 2032 **OR** [DISCONTINUED] acetaminophen (Tylenol) oral liquid 650 mg, 650 mg, oral, q4h PRN **OR** [DISCONTINUED] acetaminophen (Tylenol) suppository 650 mg, 650 mg, rectal, q4h PRN,  Arthur G Salomone, DO    albuterol 2.5 mg /3 mL (0.083 %) nebulizer solution 2.5 mg, 2.5 mg, nebulization, q2h PRN, Deep Gutierrez, DO    atorvastatin (Lipitor) tablet 20 mg, 20 mg, oral, Daily, Arthur G Salomone, DO, 20 mg at 04/24/25 0924    ciprofloxacin (Cipro) 400 mg in dextrose 5%  mL, 400 mg, intravenous, q12h, Deep Gutierrez, DO, Last Rate: 200 mL/hr at 04/25/25 0613, 400 mg at 04/25/25 0613    [Held by provider] enoxaparin (Lovenox) syringe 40 mg, 40 mg, subcutaneous, q24h, VINCENT Werner-CNP, 40 mg at 04/24/25 0935    guaiFENesin (Mucinex) 12 hr tablet 1,200 mg, 1,200 mg, oral, BID, Deep Gutierrez, DO, 1,200 mg at 04/24/25 2045    losartan (Cozaar) tablet 50 mg, 50 mg, oral, Daily, Arthur G Salomone, DO, 50 mg at 04/24/25 0924    magnesium hydroxide (Milk of Magnesia) 400 mg/5 mL suspension 30 mL, 30 mL, oral, Daily PRN, Deep Gutierrez, DO, 30 mL at 04/24/25 0628    metroNIDAZOLE (Flagyl) 500 mg in sodium chloride (iso)  mL, 500 mg, intravenous, q8h, Deep Gutierrez, DO, Stopped at 04/25/25 0350    morphine injection 1 mg, 1 mg, intravenous, q4h PRN, Arthur G Salomone, DO, 1 mg at 04/21/25 0324    ondansetron (Zofran) tablet 4 mg, 4 mg, oral, q8h PRN, 4 mg at 04/23/25 0507 **OR** ondansetron (Zofran) injection 4 mg, 4 mg, intravenous, q8h PRN, Arthur G Salomone, DO    oxyCODONE (Roxicodone) immediate release tablet 5 mg, 5 mg, oral, q4h PRN, Arthur G Salomone, DO, 5 mg at 04/24/25 5743    pantoprazole (ProtoNix) EC tablet 40 mg, 40 mg, oral, BID AC, Deep Gutierrez, DO, 40 mg at 04/24/25 1633    polyethylene glycol (Glycolax, Miralax) packet 17 g, 17 g, oral, BID, Deep Gutierrez, DO, 17 g at 04/23/25 2032    sennosides-docusate sodium (Eugenie-Colace) 8.6-50 mg per tablet 2 tablet, 2 tablet, oral, BID, Deep Gutierrez, DO, 2 tablet at 04/23/25 2032    sucralfate (Carafate) suspension 1 g, 1 g, oral, q6h JAMEL, Deep Gutierrez, DO, 1 g at 04/24/25 0256

## 2025-04-25 NOTE — CARE PLAN
The patient's goals for the shift include    Problem: Pain - Adult  Goal: Verbalizes/displays adequate comfort level or baseline comfort level  Outcome: Progressing     Problem: Safety - Adult  Goal: Free from fall injury  Outcome: Met       The clinical goals for the shift include Pt will have decreased abd pain    Over the shift, the patient did not make progress toward the following goals.   Problem: Chronic Conditions and Co-morbidities  Goal: Patient's chronic conditions and co-morbidity symptoms are monitored and maintained or improved  Outcome: Not Progressing  Note: Pt has required additional procedures following the removal of her gallbladder. Pt is waiting for drain placement on 4/25      Problem: Nutrition  Goal: Nutrient intake appropriate for maintaining nutritional needs  Outcome: Not Progressing  Note: Lack of appetite

## 2025-04-25 NOTE — PROGRESS NOTES
Between 7AM-7PM please message me via Epic Secure Chat.  After 7PM please page Nocturnist on call.    [facility] Hospitalist Progress Note      Yuni Jordan    :  1970(54 y.o.)    MRN:  [id number]  Date: 25     Assessment and Plan:       :  WBC higher today, she still has acute abd tenderness... IR consult today, ID consult, f/up ID.     K normalized.         :  Wbc still high, pain/tenderness remains.... repeat CT with enlarging fluid collection in GB fossa... cont iv abx, will discuss with OR team. May need IR.    Replace K.   Constipation better s/p large BM last night.    She's otherwise on RA, afebrile, and feels better today.       :  Wbc unchanged from yesterday, she still endorses abd pain.... discussed with OR team who wants to continue to monitor on iv abx and possibly repeat CT abd/pelvis tomorrow or Friday if still no improvement in pain.     Hx GJ ulcer... cont home PPI BID, carafate. She has an OP f/up with GI next month.     Home regimen for chronic conditions  Dvt px with lovenox        :  Acute Cholecystitis  - Surgery consulted. Lap daria on  with Dr. Walsh  - CT  showed Fluid collection with few foci of gas within the surgical bed with minimal extension along the anterior hepatic margin -> expected post op finding per surgery team. WBC continues to down trend.  - Continue Cipro/Flagyl with end date   - adia tylenol, oxycodone prn; IV morphine ordered but limiting use as goal is home once pain controlled and will only have PO meds at home  - continue low fat diet  - OOB and ambulate as much as tolerated    Post op constipation  - adia miralax bid, senna bid  - milk of mag prn    Gastrojejunostomy ucler  - Seen on EGD 25. PPI BID. Add carafate qid  - op follow up with GI for repeat EGD around 6/15/25    HTN  - continue losartan    HLD  - on statin    DVT Prophylaxis: subcutaneous Lovenox    Disposition: awaiting improvement in abd  pain    Subjective:      Interval History:   Vitals and chart notes from overnight reviewed.   No acute issues overnight.   Patient seen and evaluated at bedside.   Not using IV pain meds much but reports pain still poorly controlled. PO intake poor as she doesn't tolerate much of what's on the menu due to her ulcers.     Review of Systems:   Other than patient's chronic conditions and those complaints in the history above, the rest of the 10 systems review were done and were negative.     Current medications:  Scheduled Meds:Scheduled Medications[1]  Continuous Infusions:Continuous Medications[2]  PRN Meds:PRN Medications[3]      Objective:     Heart Rate:  [71-84]   Temp:  [35.8 °C (96.5 °F)-37.4 °C (99.3 °F)]   Resp:  [18]   BP: (125-145)/(54-84)   SpO2:  [90 %-99 %]     Oxygen Dose: *4 L/min    Physical Exam  Vitals and nursing note reviewed.   HENT:      Mouth/Throat:      Mouth: Mucous membranes are moist.      Pharynx: Oropharynx is clear.   Cardiovascular:      Rate and Rhythm: Normal rate and regular rhythm.   Pulmonary:      Effort: Pulmonary effort is normal.   Abdominal:      General: There is no distension.      Palpations: Abdomen is soft.      Tenderness: There is abdominal tenderness.   Neurological:      Mental Status: She is alert and oriented to person, place, and time.         Labs:   Lab Results   Component Value Date     04/25/2025    K 3.9 04/25/2025     (H) 04/25/2025    CO2 21 04/25/2025    BUN 13 04/25/2025    CREATININE 0.65 04/25/2025    GLUCOSE 98 04/25/2025    CALCIUM 8.2 (L) 04/25/2025    PROT 5.9 (L) 04/24/2025    BILITOT 0.4 04/24/2025    ALKPHOS 99 04/24/2025    AST 14 04/24/2025    ALT 14 04/24/2025       Lab Results   Component Value Date    WBC 16.8 (H) 04/25/2025    HGB 10.9 (L) 04/25/2025    HCT 33.9 (L) 04/25/2025    MCV 92 04/25/2025     04/25/2025            [1] acetaminophen, 975 mg, oral, TID  atorvastatin, 20 mg, oral, Daily  ciprofloxacin, 400 mg,  intravenous, q12h  [Held by provider] enoxaparin, 40 mg, subcutaneous, q24h  guaiFENesin, 1,200 mg, oral, BID  losartan, 50 mg, oral, Daily  metroNIDAZOLE, 500 mg, intravenous, q8h  pantoprazole, 40 mg, oral, BID AC  polyethylene glycol, 17 g, oral, BID  sennosides-docusate sodium, 2 tablet, oral, BID  sucralfate, 1 g, oral, q6h JAMEL     [2]    [3] PRN medications: albuterol, magnesium hydroxide, morphine, ondansetron **OR** ondansetron, oxyCODONE

## 2025-04-26 LAB
ANION GAP SERPL CALC-SCNC: 12 MMOL/L (ref 10–20)
BASOPHILS # BLD AUTO: 0.08 X10*3/UL (ref 0–0.1)
BASOPHILS NFR BLD AUTO: 0.6 %
BUN SERPL-MCNC: 13 MG/DL (ref 6–23)
CALCIUM SERPL-MCNC: 8 MG/DL (ref 8.6–10.3)
CHLORIDE SERPL-SCNC: 109 MMOL/L (ref 98–107)
CO2 SERPL-SCNC: 21 MMOL/L (ref 21–32)
CREAT SERPL-MCNC: 0.52 MG/DL (ref 0.5–1.05)
EGFRCR SERPLBLD CKD-EPI 2021: >90 ML/MIN/1.73M*2
EOSINOPHIL # BLD AUTO: 0.52 X10*3/UL (ref 0–0.7)
EOSINOPHIL NFR BLD AUTO: 3.9 %
ERYTHROCYTE [DISTWIDTH] IN BLOOD BY AUTOMATED COUNT: 14.2 % (ref 11.5–14.5)
GLUCOSE BLD MANUAL STRIP-MCNC: 102 MG/DL (ref 74–99)
GLUCOSE SERPL-MCNC: 94 MG/DL (ref 74–99)
HCT VFR BLD AUTO: 33.9 % (ref 36–46)
HGB BLD-MCNC: 11.2 G/DL (ref 12–16)
IMM GRANULOCYTES # BLD AUTO: 0.15 X10*3/UL (ref 0–0.7)
IMM GRANULOCYTES NFR BLD AUTO: 1.1 % (ref 0–0.9)
LYMPHOCYTES # BLD AUTO: 3.3 X10*3/UL (ref 1.2–4.8)
LYMPHOCYTES NFR BLD AUTO: 24.8 %
MCH RBC QN AUTO: 29.9 PG (ref 26–34)
MCHC RBC AUTO-ENTMCNC: 33 G/DL (ref 32–36)
MCV RBC AUTO: 90 FL (ref 80–100)
MONOCYTES # BLD AUTO: 1.37 X10*3/UL (ref 0.1–1)
MONOCYTES NFR BLD AUTO: 10.3 %
NEUTROPHILS # BLD AUTO: 7.91 X10*3/UL (ref 1.2–7.7)
NEUTROPHILS NFR BLD AUTO: 59.3 %
NRBC BLD-RTO: 0 /100 WBCS (ref 0–0)
PLATELET # BLD AUTO: 360 X10*3/UL (ref 150–450)
POTASSIUM SERPL-SCNC: 4.4 MMOL/L (ref 3.5–5.3)
RBC # BLD AUTO: 3.75 X10*6/UL (ref 4–5.2)
SODIUM SERPL-SCNC: 138 MMOL/L (ref 136–145)
WBC # BLD AUTO: 13.3 X10*3/UL (ref 4.4–11.3)

## 2025-04-26 PROCEDURE — 2500000002 HC RX 250 W HCPCS SELF ADMINISTERED DRUGS (ALT 637 FOR MEDICARE OP, ALT 636 FOR OP/ED): Performed by: HOSPITALIST

## 2025-04-26 PROCEDURE — 85025 COMPLETE CBC W/AUTO DIFF WBC: CPT | Performed by: HOSPITALIST

## 2025-04-26 PROCEDURE — 99233 SBSQ HOSP IP/OBS HIGH 50: CPT | Performed by: INTERNAL MEDICINE

## 2025-04-26 PROCEDURE — 2500000004 HC RX 250 GENERAL PHARMACY W/ HCPCS (ALT 636 FOR OP/ED): Mod: JZ | Performed by: INTERNAL MEDICINE

## 2025-04-26 PROCEDURE — 1100000001 HC PRIVATE ROOM DAILY

## 2025-04-26 PROCEDURE — 82374 ASSAY BLOOD CARBON DIOXIDE: CPT | Performed by: HOSPITALIST

## 2025-04-26 PROCEDURE — 2500000005 HC RX 250 GENERAL PHARMACY W/O HCPCS: Performed by: STUDENT IN AN ORGANIZED HEALTH CARE EDUCATION/TRAINING PROGRAM

## 2025-04-26 PROCEDURE — 2500000001 HC RX 250 WO HCPCS SELF ADMINISTERED DRUGS (ALT 637 FOR MEDICARE OP): Performed by: HOSPITALIST

## 2025-04-26 PROCEDURE — 36415 COLL VENOUS BLD VENIPUNCTURE: CPT | Performed by: HOSPITALIST

## 2025-04-26 PROCEDURE — 82947 ASSAY GLUCOSE BLOOD QUANT: CPT

## 2025-04-26 PROCEDURE — 2500000001 HC RX 250 WO HCPCS SELF ADMINISTERED DRUGS (ALT 637 FOR MEDICARE OP): Performed by: INTERNAL MEDICINE

## 2025-04-26 RX ORDER — BENZONATATE 100 MG/1
100 CAPSULE ORAL 3 TIMES DAILY PRN
Status: DISCONTINUED | OUTPATIENT
Start: 2025-04-26 | End: 2025-04-28 | Stop reason: HOSPADM

## 2025-04-26 RX ADMIN — MORPHINE SULFATE 1 MG: 2 INJECTION, SOLUTION INTRAMUSCULAR; INTRAVENOUS at 14:03

## 2025-04-26 RX ADMIN — SUCRALFATE ORAL SUSPENSION 1 G: 1 SUSPENSION ORAL at 13:01

## 2025-04-26 RX ADMIN — LOSARTAN POTASSIUM 50 MG: 50 TABLET, FILM COATED ORAL at 08:47

## 2025-04-26 RX ADMIN — PANTOPRAZOLE SODIUM 40 MG: 40 TABLET, DELAYED RELEASE ORAL at 15:28

## 2025-04-26 RX ADMIN — CIPROFLOXACIN 400 MG: 400 INJECTION, SOLUTION INTRAVENOUS at 18:56

## 2025-04-26 RX ADMIN — SUCRALFATE ORAL SUSPENSION 1 G: 1 SUSPENSION ORAL at 00:26

## 2025-04-26 RX ADMIN — MORPHINE SULFATE 1 MG: 2 INJECTION, SOLUTION INTRAMUSCULAR; INTRAVENOUS at 05:36

## 2025-04-26 RX ADMIN — METRONIDAZOLE 500 MG: 500 INJECTION, SOLUTION INTRAVENOUS at 02:42

## 2025-04-26 RX ADMIN — SUCRALFATE ORAL SUSPENSION 1 G: 1 SUSPENSION ORAL at 06:18

## 2025-04-26 RX ADMIN — LIDOCAINE 4% 2 PATCH: 40 PATCH TOPICAL at 08:46

## 2025-04-26 RX ADMIN — SUCRALFATE ORAL SUSPENSION 1 G: 1 SUSPENSION ORAL at 17:51

## 2025-04-26 RX ADMIN — METRONIDAZOLE 500 MG: 500 INJECTION, SOLUTION INTRAVENOUS at 10:39

## 2025-04-26 RX ADMIN — PANTOPRAZOLE SODIUM 40 MG: 40 TABLET, DELAYED RELEASE ORAL at 08:47

## 2025-04-26 RX ADMIN — METRONIDAZOLE 500 MG: 500 INJECTION, SOLUTION INTRAVENOUS at 17:51

## 2025-04-26 RX ADMIN — MORPHINE SULFATE 1 MG: 2 INJECTION, SOLUTION INTRAMUSCULAR; INTRAVENOUS at 22:28

## 2025-04-26 RX ADMIN — CIPROFLOXACIN 400 MG: 400 INJECTION, SOLUTION INTRAVENOUS at 06:18

## 2025-04-26 RX ADMIN — ATORVASTATIN CALCIUM 20 MG: 20 TABLET, FILM COATED ORAL at 08:47

## 2025-04-26 ASSESSMENT — PAIN SCALES - GENERAL
PAINLEVEL_OUTOF10: 0 - NO PAIN
PAINLEVEL_OUTOF10: 10 - WORST POSSIBLE PAIN
PAINLEVEL_OUTOF10: 7
PAINLEVEL_OUTOF10: 1
PAINLEVEL_OUTOF10: 0 - NO PAIN

## 2025-04-26 ASSESSMENT — PAIN - FUNCTIONAL ASSESSMENT
PAIN_FUNCTIONAL_ASSESSMENT: 0-10

## 2025-04-26 ASSESSMENT — COGNITIVE AND FUNCTIONAL STATUS - GENERAL
DAILY ACTIVITIY SCORE: 24
MOBILITY SCORE: 24
DAILY ACTIVITIY SCORE: 24
DAILY ACTIVITIY SCORE: 24

## 2025-04-26 ASSESSMENT — PAIN DESCRIPTION - DESCRIPTORS: DESCRIPTORS: BURNING

## 2025-04-26 ASSESSMENT — PAIN DESCRIPTION - LOCATION
LOCATION: ABDOMEN
LOCATION: ABDOMEN

## 2025-04-26 ASSESSMENT — PAIN DESCRIPTION - ORIENTATION: ORIENTATION: RIGHT

## 2025-04-26 NOTE — CARE PLAN
The patient's goals for the shift include      The clinical goals for the shift include pain control      Problem: Pain - Adult  Goal: Verbalizes/displays adequate comfort level or baseline comfort level  Outcome: Progressing

## 2025-04-26 NOTE — PROGRESS NOTES
"Yuni Jordan is a 54 y.o. female on day 7 of admission presenting with Acute cholecystitis.    Assessment/Plan   Follow-up on cultures.  Another day of IV antibiotics.    Subjective   Feeling much better today.  Much less pain.       Objective     Physical Exam  NAD  A&Ox3  Non icteric  CTA  RR  Abdomen soft min tender. Wounds clean, intact.  Less tender.  Drain with tannish purulent fluid  Extremities warm, well perfused     Last Recorded Vitals  Blood pressure 137/67, pulse 68, temperature 36.8 °C (98.2 °F), temperature source Temporal, resp. rate 18, height 1.575 m (5' 2.01\"), weight 89.8 kg (197 lb 15.6 oz), SpO2 98%.  Intake/Output last 3 Shifts:  I/O last 3 completed shifts:  In: - (0 mL/kg)   Out: 30 (0.3 mL/kg) [Drains:30]  Weight: 89.8 kg     Relevant Results    Scheduled medications  Scheduled Medications[1]  Continuous medications  Continuous Medications[2]  PRN medications  PRN Medications[3]    Results for orders placed or performed during the hospital encounter of 04/18/25 (from the past 24 hours)   Sterile Fluid Culture/Smear    Specimen: Intra-abdominal Abscess; Fluid   Result Value Ref Range    Gram Stain (1+) Rare Polymorphonuclear leukocytes     Gram Stain No organisms seen    CBC and Auto Differential   Result Value Ref Range    WBC 13.3 (H) 4.4 - 11.3 x10*3/uL    nRBC 0.0 0.0 - 0.0 /100 WBCs    RBC 3.75 (L) 4.00 - 5.20 x10*6/uL    Hemoglobin 11.2 (L) 12.0 - 16.0 g/dL    Hematocrit 33.9 (L) 36.0 - 46.0 %    MCV 90 80 - 100 fL    MCH 29.9 26.0 - 34.0 pg    MCHC 33.0 32.0 - 36.0 g/dL    RDW 14.2 11.5 - 14.5 %    Platelets 360 150 - 450 x10*3/uL    Neutrophils % 59.3 40.0 - 80.0 %    Immature Granulocytes %, Automated 1.1 (H) 0.0 - 0.9 %    Lymphocytes % 24.8 13.0 - 44.0 %    Monocytes % 10.3 2.0 - 10.0 %    Eosinophils % 3.9 0.0 - 6.0 %    Basophils % 0.6 0.0 - 2.0 %    Neutrophils Absolute 7.91 (H) 1.20 - 7.70 x10*3/uL    Immature Granulocytes Absolute, Automated 0.15 0.00 - 0.70 x10*3/uL    " Lymphocytes Absolute 3.30 1.20 - 4.80 x10*3/uL    Monocytes Absolute 1.37 (H) 0.10 - 1.00 x10*3/uL    Eosinophils Absolute 0.52 0.00 - 0.70 x10*3/uL    Basophils Absolute 0.08 0.00 - 0.10 x10*3/uL   Basic Metabolic Panel   Result Value Ref Range    Glucose 94 74 - 99 mg/dL    Sodium 138 136 - 145 mmol/L    Potassium 4.4 3.5 - 5.3 mmol/L    Chloride 109 (H) 98 - 107 mmol/L    Bicarbonate 21 21 - 32 mmol/L    Anion Gap 12 10 - 20 mmol/L    Urea Nitrogen 13 6 - 23 mg/dL    Creatinine 0.52 0.50 - 1.05 mg/dL    eGFR >90 >60 mL/min/1.73m*2    Calcium 8.0 (L) 8.6 - 10.3 mg/dL   POCT GLUCOSE   Result Value Ref Range    POCT Glucose 102 (H) 74 - 99 mg/dL           I spent 25 minutes in the professional and overall care of this patient.      Walter Sanchez MD           [1] acetaminophen, 975 mg, oral, TID  atorvastatin, 20 mg, oral, Daily  ciprofloxacin, 400 mg, intravenous, q12h  [Held by provider] enoxaparin, 40 mg, subcutaneous, q24h  lidocaine, 2 patch, transdermal, Daily  losartan, 50 mg, oral, Daily  metroNIDAZOLE, 500 mg, intravenous, q8h  pantoprazole, 40 mg, oral, BID AC  sucralfate, 1 g, oral, q6h JAMEL    [2]    [3] PRN medications: albuterol, benzonatate, magnesium hydroxide, methocarbamol, morphine, ondansetron **OR** ondansetron, oxyCODONE

## 2025-04-26 NOTE — PROGRESS NOTES
Between 7AM-7PM please message me via Epic Secure Chat.  After 7PM please page Nocturnist on call.    [facility] Hospitalist Progress Note      Yuni Jordan    :  1970(54 y.o.)    MRN:  [id number]  Date: 25     Assessment and Plan:       :  S/p drain yesterday. Clinically much better, abd no longer tender, wbc better, afebrile, tolerating PO intake.... cont iv abx, f/up fluid cultures.        :  WBC higher today, she still has acute abd tenderness... IR consult today, ID consult, f/up ID.     K normalized.         :  Wbc still high, pain/tenderness remains.... repeat CT with enlarging fluid collection in GB fossa... cont iv abx, will discuss with OR team. May need IR.    Replace K.   Constipation better s/p large BM last night.    She's otherwise on RA, afebrile, and feels better today.       :  Wbc unchanged from yesterday, she still endorses abd pain.... discussed with OR team who wants to continue to monitor on iv abx and possibly repeat CT abd/pelvis tomorrow or Friday if still no improvement in pain.     Hx GJ ulcer... cont home PPI BID, carafate. She has an OP f/up with GI next month.     Home regimen for chronic conditions  Dvt px with lovenox        :  Acute Cholecystitis  - Surgery consulted. Lap daria on  with Dr. Walsh  - CT  showed Fluid collection with few foci of gas within the surgical bed with minimal extension along the anterior hepatic margin -> expected post op finding per surgery team. WBC continues to down trend.  - Continue Cipro/Flagyl with end date   - adia tylenol, oxycodone prn; IV morphine ordered but limiting use as goal is home once pain controlled and will only have PO meds at home  - continue low fat diet  - OOB and ambulate as much as tolerated    Post op constipation  - adia miralax bid, senna bid  - milk of mag prn    Gastrojejunostomy ucler  - Seen on EGD 25. PPI BID. Add carafate qid  - op follow up with GI for  repeat EGD around 6/15/25    HTN  - continue losartan    HLD  - on statin    DVT Prophylaxis: subcutaneous Lovenox    Disposition: awaiting improvement in abd pain    Subjective:      Interval History:   Vitals and chart notes from overnight reviewed.   No acute issues overnight.   Patient seen and evaluated at bedside.   Not using IV pain meds much but reports pain still poorly controlled. PO intake poor as she doesn't tolerate much of what's on the menu due to her ulcers.     Review of Systems:   Other than patient's chronic conditions and those complaints in the history above, the rest of the 10 systems review were done and were negative.     Current medications:  Scheduled Meds:Scheduled Medications[1]  Continuous Infusions:Continuous Medications[2]  PRN Meds:PRN Medications[3]      Objective:     Heart Rate:  [68-89]   Temp:  [35.4 °C (95.8 °F)-36.9 °C (98.5 °F)]   Resp:  [16-18]   BP: (137-162)/(62-90)   SpO2:  [93 %-98 %]     Oxygen Dose: *4 L/min    Physical Exam  Vitals and nursing note reviewed.   HENT:      Mouth/Throat:      Mouth: Mucous membranes are moist.      Pharynx: Oropharynx is clear.   Cardiovascular:      Rate and Rhythm: Normal rate and regular rhythm.   Pulmonary:      Effort: Pulmonary effort is normal.   Abdominal:      General: There is no distension.      Palpations: Abdomen is soft.      Tenderness: There is abdominal tenderness.   Neurological:      Mental Status: She is alert and oriented to person, place, and time.         Labs:   Lab Results   Component Value Date     04/26/2025    K 4.4 04/26/2025     (H) 04/26/2025    CO2 21 04/26/2025    BUN 13 04/26/2025    CREATININE 0.52 04/26/2025    GLUCOSE 94 04/26/2025    CALCIUM 8.0 (L) 04/26/2025    PROT 5.9 (L) 04/24/2025    BILITOT 0.4 04/24/2025    ALKPHOS 99 04/24/2025    AST 14 04/24/2025    ALT 14 04/24/2025       Lab Results   Component Value Date    WBC 13.3 (H) 04/26/2025    HGB 11.2 (L) 04/26/2025    HCT 33.9 (L)  04/26/2025    MCV 90 04/26/2025     04/26/2025            [1] acetaminophen, 975 mg, oral, TID  atorvastatin, 20 mg, oral, Daily  ciprofloxacin, 400 mg, intravenous, q12h  enoxaparin, 40 mg, subcutaneous, q24h  lidocaine, 2 patch, transdermal, Daily  losartan, 50 mg, oral, Daily  metroNIDAZOLE, 500 mg, intravenous, q8h  pantoprazole, 40 mg, oral, BID AC  sucralfate, 1 g, oral, q6h JAMEL     [2]    [3] PRN medications: albuterol, benzonatate, magnesium hydroxide, methocarbamol, morphine, ondansetron **OR** ondansetron, oxyCODONE

## 2025-04-27 VITALS
SYSTOLIC BLOOD PRESSURE: 130 MMHG | WEIGHT: 197.97 LBS | TEMPERATURE: 98.2 F | BODY MASS INDEX: 36.43 KG/M2 | HEIGHT: 62 IN | OXYGEN SATURATION: 95 % | HEART RATE: 72 BPM | RESPIRATION RATE: 16 BRPM | DIASTOLIC BLOOD PRESSURE: 72 MMHG

## 2025-04-27 LAB
ANION GAP SERPL CALC-SCNC: 12 MMOL/L (ref 10–20)
BACTERIA FLD CULT: NORMAL
BASOPHILS # BLD AUTO: 0.06 X10*3/UL (ref 0–0.1)
BASOPHILS NFR BLD AUTO: 0.4 %
BUN SERPL-MCNC: 13 MG/DL (ref 6–23)
CALCIUM SERPL-MCNC: 8.7 MG/DL (ref 8.6–10.3)
CHLORIDE SERPL-SCNC: 108 MMOL/L (ref 98–107)
CO2 SERPL-SCNC: 21 MMOL/L (ref 21–32)
CREAT SERPL-MCNC: 0.66 MG/DL (ref 0.5–1.05)
EGFRCR SERPLBLD CKD-EPI 2021: >90 ML/MIN/1.73M*2
EOSINOPHIL # BLD AUTO: 0.52 X10*3/UL (ref 0–0.7)
EOSINOPHIL NFR BLD AUTO: 3.7 %
ERYTHROCYTE [DISTWIDTH] IN BLOOD BY AUTOMATED COUNT: 14.1 % (ref 11.5–14.5)
GLUCOSE SERPL-MCNC: 120 MG/DL (ref 74–99)
GRAM STN SPEC: NORMAL
GRAM STN SPEC: NORMAL
HCT VFR BLD AUTO: 36.8 % (ref 36–46)
HGB BLD-MCNC: 11.9 G/DL (ref 12–16)
IMM GRANULOCYTES # BLD AUTO: 0.17 X10*3/UL (ref 0–0.7)
IMM GRANULOCYTES NFR BLD AUTO: 1.2 % (ref 0–0.9)
LYMPHOCYTES # BLD AUTO: 3.7 X10*3/UL (ref 1.2–4.8)
LYMPHOCYTES NFR BLD AUTO: 26.5 %
MCH RBC QN AUTO: 29.3 PG (ref 26–34)
MCHC RBC AUTO-ENTMCNC: 32.3 G/DL (ref 32–36)
MCV RBC AUTO: 91 FL (ref 80–100)
MONOCYTES # BLD AUTO: 1.2 X10*3/UL (ref 0.1–1)
MONOCYTES NFR BLD AUTO: 8.6 %
NEUTROPHILS # BLD AUTO: 8.29 X10*3/UL (ref 1.2–7.7)
NEUTROPHILS NFR BLD AUTO: 59.6 %
NRBC BLD-RTO: 0 /100 WBCS (ref 0–0)
PLATELET # BLD AUTO: 425 X10*3/UL (ref 150–450)
POTASSIUM SERPL-SCNC: 3.7 MMOL/L (ref 3.5–5.3)
RBC # BLD AUTO: 4.06 X10*6/UL (ref 4–5.2)
SODIUM SERPL-SCNC: 137 MMOL/L (ref 136–145)
WBC # BLD AUTO: 13.9 X10*3/UL (ref 4.4–11.3)

## 2025-04-27 PROCEDURE — 2500000001 HC RX 250 WO HCPCS SELF ADMINISTERED DRUGS (ALT 637 FOR MEDICARE OP): Performed by: INTERNAL MEDICINE

## 2025-04-27 PROCEDURE — 1100000001 HC PRIVATE ROOM DAILY

## 2025-04-27 PROCEDURE — 2500000002 HC RX 250 W HCPCS SELF ADMINISTERED DRUGS (ALT 637 FOR MEDICARE OP, ALT 636 FOR OP/ED): Performed by: HOSPITALIST

## 2025-04-27 PROCEDURE — 2500000004 HC RX 250 GENERAL PHARMACY W/ HCPCS (ALT 636 FOR OP/ED): Mod: JZ | Performed by: INTERNAL MEDICINE

## 2025-04-27 PROCEDURE — 2500000001 HC RX 250 WO HCPCS SELF ADMINISTERED DRUGS (ALT 637 FOR MEDICARE OP): Performed by: HOSPITALIST

## 2025-04-27 PROCEDURE — 99232 SBSQ HOSP IP/OBS MODERATE 35: CPT | Performed by: INTERNAL MEDICINE

## 2025-04-27 PROCEDURE — 85025 COMPLETE CBC W/AUTO DIFF WBC: CPT | Performed by: HOSPITALIST

## 2025-04-27 PROCEDURE — 82374 ASSAY BLOOD CARBON DIOXIDE: CPT | Performed by: HOSPITALIST

## 2025-04-27 PROCEDURE — 36415 COLL VENOUS BLD VENIPUNCTURE: CPT | Performed by: HOSPITALIST

## 2025-04-27 RX ORDER — CIPROFLOXACIN 500 MG/1
500 TABLET ORAL EVERY 12 HOURS SCHEDULED
Status: DISCONTINUED | OUTPATIENT
Start: 2025-04-27 | End: 2025-04-28 | Stop reason: HOSPADM

## 2025-04-27 RX ORDER — METRONIDAZOLE 500 MG/1
500 TABLET ORAL EVERY 8 HOURS SCHEDULED
Status: DISCONTINUED | OUTPATIENT
Start: 2025-04-27 | End: 2025-04-28 | Stop reason: HOSPADM

## 2025-04-27 RX ORDER — METHOCARBAMOL 500 MG/1
500 TABLET, FILM COATED ORAL EVERY 6 HOURS PRN
Status: DISCONTINUED | OUTPATIENT
Start: 2025-04-27 | End: 2025-04-28 | Stop reason: HOSPADM

## 2025-04-27 RX ADMIN — SUCRALFATE ORAL SUSPENSION 1 G: 1 SUSPENSION ORAL at 17:41

## 2025-04-27 RX ADMIN — OXYCODONE HYDROCHLORIDE 5 MG: 5 TABLET ORAL at 13:47

## 2025-04-27 RX ADMIN — METHOCARBAMOL 500 MG: 500 TABLET ORAL at 23:43

## 2025-04-27 RX ADMIN — METRONIDAZOLE 500 MG: 500 TABLET ORAL at 16:24

## 2025-04-27 RX ADMIN — ENOXAPARIN SODIUM 40 MG: 40 INJECTION SUBCUTANEOUS at 09:25

## 2025-04-27 RX ADMIN — METHOCARBAMOL 500 MG: 500 TABLET ORAL at 17:43

## 2025-04-27 RX ADMIN — SUCRALFATE ORAL SUSPENSION 1 G: 1 SUSPENSION ORAL at 02:05

## 2025-04-27 RX ADMIN — PANTOPRAZOLE SODIUM 40 MG: 40 TABLET, DELAYED RELEASE ORAL at 06:25

## 2025-04-27 RX ADMIN — OXYCODONE HYDROCHLORIDE 5 MG: 5 TABLET ORAL at 22:06

## 2025-04-27 RX ADMIN — METRONIDAZOLE 500 MG: 500 TABLET ORAL at 09:25

## 2025-04-27 RX ADMIN — SUCRALFATE ORAL SUSPENSION 1 G: 1 SUSPENSION ORAL at 06:25

## 2025-04-27 RX ADMIN — SUCRALFATE ORAL SUSPENSION 1 G: 1 SUSPENSION ORAL at 13:09

## 2025-04-27 RX ADMIN — METHOCARBAMOL 500 MG: 500 TABLET ORAL at 09:25

## 2025-04-27 RX ADMIN — METRONIDAZOLE 500 MG: 500 TABLET ORAL at 21:50

## 2025-04-27 RX ADMIN — SUCRALFATE ORAL SUSPENSION 1 G: 1 SUSPENSION ORAL at 23:43

## 2025-04-27 RX ADMIN — MORPHINE SULFATE 1 MG: 2 INJECTION, SOLUTION INTRAMUSCULAR; INTRAVENOUS at 06:25

## 2025-04-27 RX ADMIN — CIPROFLOXACIN HYDROCHLORIDE 500 MG: 500 TABLET, FILM COATED ORAL at 17:41

## 2025-04-27 RX ADMIN — METRONIDAZOLE 500 MG: 500 INJECTION, SOLUTION INTRAVENOUS at 02:05

## 2025-04-27 RX ADMIN — ATORVASTATIN CALCIUM 20 MG: 20 TABLET, FILM COATED ORAL at 09:25

## 2025-04-27 RX ADMIN — LOSARTAN POTASSIUM 50 MG: 50 TABLET, FILM COATED ORAL at 09:25

## 2025-04-27 RX ADMIN — PANTOPRAZOLE SODIUM 40 MG: 40 TABLET, DELAYED RELEASE ORAL at 16:22

## 2025-04-27 ASSESSMENT — PAIN SCALES - GENERAL
PAINLEVEL_OUTOF10: 5 - MODERATE PAIN
PAINLEVEL_OUTOF10: 3
PAINLEVEL_OUTOF10: 2
PAINLEVEL_OUTOF10: 7
PAINLEVEL_OUTOF10: 7
PAINLEVEL_OUTOF10: 4

## 2025-04-27 ASSESSMENT — COGNITIVE AND FUNCTIONAL STATUS - GENERAL
DAILY ACTIVITIY SCORE: 24
MOBILITY SCORE: 24

## 2025-04-27 ASSESSMENT — PAIN DESCRIPTION - DESCRIPTORS
DESCRIPTORS: ACHING
DESCRIPTORS: ACHING
DESCRIPTORS: BURNING

## 2025-04-27 ASSESSMENT — PAIN - FUNCTIONAL ASSESSMENT
PAIN_FUNCTIONAL_ASSESSMENT: 0-10

## 2025-04-27 ASSESSMENT — PAIN DESCRIPTION - LOCATION: LOCATION: ABDOMEN

## 2025-04-27 ASSESSMENT — PAIN DESCRIPTION - ORIENTATION: ORIENTATION: RIGHT

## 2025-04-27 NOTE — PROGRESS NOTES
Between 7AM-7PM please message me via Epic Secure Chat.  After 7PM please page Nocturnist on call.    [facility] Hospitalist Progress Note      Yuni Jordan    :  1970(54 y.o.)    MRN:  [id number]  Date: 25     Assessment and Plan:         :  S/p lap daria, was gangrenous, developed post op GB fossa Abscess, now s/p drain.     Pt lost iv access... cont po abx per ID.   Cont drain, f/up IR tomorrow. Not sure yet if she's going home with the drain but appears to be draining a significant amount still.     Otherwise doing well, tolerating PO intake, on room air, mobilizing at baseline.     F/up labs, ID and OR recs.   Dispo home likely tomorrow with PO abx +/- RUQ drain.       :  S/p drain yesterday. Clinically much better, abd no longer tender, wbc better, afebrile, tolerating PO intake.... cont iv abx, f/up fluid cultures.        :  WBC higher today, she still has acute abd tenderness... IR consult today, ID consult, f/up ID.     K normalized.         :  Wbc still high, pain/tenderness remains.... repeat CT with enlarging fluid collection in GB fossa... cont iv abx, will discuss with OR team. May need IR.    Replace K.   Constipation better s/p large BM last night.    She's otherwise on RA, afebrile, and feels better today.       :  Wbc unchanged from yesterday, she still endorses abd pain.... discussed with OR team who wants to continue to monitor on iv abx and possibly repeat CT abd/pelvis tomorrow or Friday if still no improvement in pain.     Hx GJ ulcer... cont home PPI BID, carafate. She has an OP f/up with GI next month.     Home regimen for chronic conditions  Dvt px with lovenox        :  Acute Cholecystitis  - Surgery consulted. Lap daria on  with Dr. Walsh  - CT  showed Fluid collection with few foci of gas within the surgical bed with minimal extension along the anterior hepatic margin -> expected post op finding per surgery team. WBC  continues to down trend.  - Continue Cipro/Flagyl with end date 4/25  - adia tylenol, oxycodone prn; IV morphine ordered but limiting use as goal is home once pain controlled and will only have PO meds at home  - continue low fat diet  - OOB and ambulate as much as tolerated    Post op constipation  - adia miralax bid, senna bid  - milk of mag prn    Gastrojejunostomy ucler  - Seen on EGD 4/16/25. PPI BID. Add carafate qid  - op follow up with GI for repeat EGD around 6/15/25    HTN  - continue losartan    HLD  - on statin    DVT Prophylaxis: subcutaneous Lovenox    Disposition: awaiting improvement in abd pain    Subjective:      Interval History:   Vitals and chart notes from overnight reviewed.   No acute issues overnight.   Patient seen and evaluated at bedside.   Not using IV pain meds much but reports pain still poorly controlled. PO intake poor as she doesn't tolerate much of what's on the menu due to her ulcers.     Review of Systems:   Other than patient's chronic conditions and those complaints in the history above, the rest of the 10 systems review were done and were negative.     Current medications:  Scheduled Meds:Scheduled Medications[1]  Continuous Infusions:Continuous Medications[2]  PRN Meds:PRN Medications[3]      Objective:     Heart Rate:  [61-82]   Temp:  [36 °C (96.8 °F)-36.8 °C (98.2 °F)]   Resp:  [17-18]   BP: (130-153)/(62-78)   SpO2:  [93 %-98 %]     Oxygen Dose: *4 L/min    Physical Exam  Vitals and nursing note reviewed.   HENT:      Mouth/Throat:      Mouth: Mucous membranes are moist.      Pharynx: Oropharynx is clear.   Cardiovascular:      Rate and Rhythm: Normal rate and regular rhythm.   Pulmonary:      Effort: Pulmonary effort is normal.   Abdominal:      General: There is no distension.      Palpations: Abdomen is soft.   Neurological:      Mental Status: She is alert and oriented to person, place, and time.         Labs:   Lab Results   Component Value Date     04/27/2025     K 3.7 04/27/2025     (H) 04/27/2025    CO2 21 04/27/2025    BUN 13 04/27/2025    CREATININE 0.66 04/27/2025    GLUCOSE 120 (H) 04/27/2025    CALCIUM 8.7 04/27/2025    PROT 5.9 (L) 04/24/2025    BILITOT 0.4 04/24/2025    ALKPHOS 99 04/24/2025    AST 14 04/24/2025    ALT 14 04/24/2025       Lab Results   Component Value Date    WBC 13.9 (H) 04/27/2025    HGB 11.9 (L) 04/27/2025    HCT 36.8 04/27/2025    MCV 91 04/27/2025     04/27/2025            [1] acetaminophen, 975 mg, oral, TID  atorvastatin, 20 mg, oral, Daily  ciprofloxacin, 500 mg, oral, q12h JAMEL  enoxaparin, 40 mg, subcutaneous, q24h  lidocaine, 2 patch, transdermal, Daily  losartan, 50 mg, oral, Daily  metroNIDAZOLE, 500 mg, oral, q8h JAMEL  pantoprazole, 40 mg, oral, BID AC  sucralfate, 1 g, oral, q6h JAMEL     [2]    [3] PRN medications: albuterol, benzonatate, magnesium hydroxide, methocarbamol, morphine, ondansetron **OR** ondansetron, oxyCODONE

## 2025-04-27 NOTE — NURSING NOTE
Writer assuming care of pt. Pt lost IV access due to infiltration, c/o pain at IV insertion site. Writer removed IV per protocol. Pt claims that she' had 9 IV insertion attempts within last 4 days and she requires US guided Ivs. Writer notified Attending regarding situation who deferred to ID. IV Abx changed to PO abx. Both ID and Attending verbalized via secure chat that pt can be without IV access ---Antonio Irizarry RN

## 2025-04-27 NOTE — CARE PLAN
The patient's goals for the shift include  remain free from falls    The clinical goals for the shift include pain management    Over the shift, the patient did not make progress toward the following goals. Barriers to progression include   Problem: Discharge Planning  Goal: Discharge to home or other facility with appropriate resources  Outcome: Progressing   . Recommendations to address these barriers include   Problem: Pain - Adult  Goal: Verbalizes/displays adequate comfort level or baseline comfort level  Outcome: Progressing  Flowsheets (Taken 4/26/2025 2036)  Verbalizes/displays adequate comfort level or baseline comfort level:   Encourage patient to monitor pain and request assistance   Assess pain using appropriate pain scale   Administer analgesics based on type and severity of pain and evaluate response   Implement non-pharmacological measures as appropriate and evaluate response   Consider cultural and social influences on pain and pain management   Notify Licensed Independent Practitioner if interventions unsuccessful or patient reports new pain   .

## 2025-04-28 ENCOUNTER — PHARMACY VISIT (OUTPATIENT)
Dept: PHARMACY | Facility: CLINIC | Age: 55
End: 2025-04-28
Payer: COMMERCIAL

## 2025-04-28 VITALS
TEMPERATURE: 97.5 F | OXYGEN SATURATION: 96 % | RESPIRATION RATE: 18 BRPM | HEART RATE: 75 BPM | SYSTOLIC BLOOD PRESSURE: 134 MMHG | HEIGHT: 62 IN | WEIGHT: 197.97 LBS | BODY MASS INDEX: 36.43 KG/M2 | DIASTOLIC BLOOD PRESSURE: 68 MMHG

## 2025-04-28 PROBLEM — K65.1 ABSCESS, INTRA-ABDOMINAL, POSTOPERATIVE: Status: ACTIVE | Noted: 2025-04-28

## 2025-04-28 PROBLEM — T81.43XA ABSCESS, INTRA-ABDOMINAL, POSTOPERATIVE: Status: ACTIVE | Noted: 2025-04-28

## 2025-04-28 LAB
ANION GAP SERPL CALC-SCNC: 11 MMOL/L (ref 10–20)
BACTERIA FLD CULT: NORMAL
BUN SERPL-MCNC: 13 MG/DL (ref 6–23)
CALCIUM SERPL-MCNC: 8.8 MG/DL (ref 8.6–10.3)
CHLORIDE SERPL-SCNC: 110 MMOL/L (ref 98–107)
CO2 SERPL-SCNC: 20 MMOL/L (ref 21–32)
CREAT SERPL-MCNC: 0.71 MG/DL (ref 0.5–1.05)
EGFRCR SERPLBLD CKD-EPI 2021: >90 ML/MIN/1.73M*2
ERYTHROCYTE [DISTWIDTH] IN BLOOD BY AUTOMATED COUNT: 14.3 % (ref 11.5–14.5)
FUNGUS SPEC CULT: NORMAL
FUNGUS SPEC FUNGUS STN: NORMAL
GLUCOSE SERPL-MCNC: 108 MG/DL (ref 74–99)
GRAM STN SPEC: NORMAL
GRAM STN SPEC: NORMAL
HCT VFR BLD AUTO: 34.5 % (ref 36–46)
HGB BLD-MCNC: 11.3 G/DL (ref 12–16)
LABORATORY COMMENT REPORT: NORMAL
MCH RBC QN AUTO: 30.1 PG (ref 26–34)
MCHC RBC AUTO-ENTMCNC: 32.8 G/DL (ref 32–36)
MCV RBC AUTO: 92 FL (ref 80–100)
NRBC BLD-RTO: 0 /100 WBCS (ref 0–0)
PATH REPORT.FINAL DX SPEC: NORMAL
PATH REPORT.GROSS SPEC: NORMAL
PATH REPORT.RELEVANT HX SPEC: NORMAL
PATH REPORT.TOTAL CANCER: NORMAL
PLATELET # BLD AUTO: 424 X10*3/UL (ref 150–450)
POTASSIUM SERPL-SCNC: 3.6 MMOL/L (ref 3.5–5.3)
RBC # BLD AUTO: 3.75 X10*6/UL (ref 4–5.2)
RESIDENT REVIEW: NORMAL
SODIUM SERPL-SCNC: 137 MMOL/L (ref 136–145)
WBC # BLD AUTO: 12.8 X10*3/UL (ref 4.4–11.3)

## 2025-04-28 PROCEDURE — 2500000001 HC RX 250 WO HCPCS SELF ADMINISTERED DRUGS (ALT 637 FOR MEDICARE OP): Performed by: HOSPITALIST

## 2025-04-28 PROCEDURE — RXMED WILLOW AMBULATORY MEDICATION CHARGE

## 2025-04-28 PROCEDURE — 2500000001 HC RX 250 WO HCPCS SELF ADMINISTERED DRUGS (ALT 637 FOR MEDICARE OP): Performed by: INTERNAL MEDICINE

## 2025-04-28 PROCEDURE — 80048 BASIC METABOLIC PNL TOTAL CA: CPT | Performed by: INTERNAL MEDICINE

## 2025-04-28 PROCEDURE — 99239 HOSP IP/OBS DSCHRG MGMT >30: CPT | Performed by: HOSPITALIST

## 2025-04-28 PROCEDURE — 85027 COMPLETE CBC AUTOMATED: CPT | Performed by: INTERNAL MEDICINE

## 2025-04-28 PROCEDURE — 99232 SBSQ HOSP IP/OBS MODERATE 35: CPT

## 2025-04-28 PROCEDURE — 36415 COLL VENOUS BLD VENIPUNCTURE: CPT | Performed by: INTERNAL MEDICINE

## 2025-04-28 PROCEDURE — 2500000005 HC RX 250 GENERAL PHARMACY W/O HCPCS: Performed by: STUDENT IN AN ORGANIZED HEALTH CARE EDUCATION/TRAINING PROGRAM

## 2025-04-28 PROCEDURE — 2500000002 HC RX 250 W HCPCS SELF ADMINISTERED DRUGS (ALT 637 FOR MEDICARE OP, ALT 636 FOR OP/ED): Performed by: HOSPITALIST

## 2025-04-28 RX ORDER — OXYCODONE HYDROCHLORIDE 5 MG/1
5 TABLET ORAL EVERY 6 HOURS PRN
Qty: 10 TABLET | Refills: 0 | Status: SHIPPED | OUTPATIENT
Start: 2025-04-28

## 2025-04-28 RX ORDER — ACETAMINOPHEN 325 MG/1
TABLET ORAL
Qty: 90 TABLET | Refills: 0 | Status: SHIPPED | OUTPATIENT
Start: 2025-04-28

## 2025-04-28 RX ORDER — SODIUM CHLORIDE 0.9 % (FLUSH) 0.9 %
10 SYRINGE (ML) INJECTION 2 TIMES DAILY
Status: DISCONTINUED | OUTPATIENT
Start: 2025-04-28 | End: 2025-04-28 | Stop reason: HOSPADM

## 2025-04-28 RX ORDER — METRONIDAZOLE 500 MG/1
500 TABLET ORAL EVERY 8 HOURS SCHEDULED
Qty: 30 TABLET | Refills: 0 | Status: SHIPPED | OUTPATIENT
Start: 2025-04-28 | End: 2025-05-08

## 2025-04-28 RX ORDER — SODIUM CHLORIDE 0.9 % (FLUSH) 0.9 %
10 SYRINGE (ML) INJECTION 2 TIMES DAILY
Qty: 280 ML | Refills: 0 | Status: SHIPPED | OUTPATIENT
Start: 2025-04-28 | End: 2025-05-12

## 2025-04-28 RX ORDER — LOSARTAN POTASSIUM 50 MG/1
50 TABLET ORAL DAILY
Qty: 30 TABLET | Refills: 0 | Status: SHIPPED | OUTPATIENT
Start: 2025-04-28 | End: 2025-05-28

## 2025-04-28 RX ORDER — LIDOCAINE 560 MG/1
2 PATCH PERCUTANEOUS; TOPICAL; TRANSDERMAL DAILY
Qty: 10 PATCH | Refills: 0 | Status: SHIPPED | OUTPATIENT
Start: 2025-04-29 | End: 2025-05-09

## 2025-04-28 RX ORDER — METHOCARBAMOL 500 MG/1
500 TABLET, FILM COATED ORAL EVERY 8 HOURS PRN
Qty: 30 TABLET | Refills: 0 | Status: SHIPPED | OUTPATIENT
Start: 2025-04-28 | End: 2025-05-08

## 2025-04-28 RX ORDER — CIPROFLOXACIN 500 MG/1
500 TABLET ORAL EVERY 12 HOURS SCHEDULED
Qty: 20 TABLET | Refills: 0 | Status: SHIPPED | OUTPATIENT
Start: 2025-04-28 | End: 2025-05-08

## 2025-04-28 RX ORDER — SUCRALFATE 1 G/10ML
1 SUSPENSION ORAL EVERY 6 HOURS SCHEDULED
Qty: 1260 ML | Refills: 0 | Status: SHIPPED | OUTPATIENT
Start: 2025-04-28 | End: 2025-05-28

## 2025-04-28 RX ADMIN — CIPROFLOXACIN HYDROCHLORIDE 500 MG: 500 TABLET, FILM COATED ORAL at 09:33

## 2025-04-28 RX ADMIN — PANTOPRAZOLE SODIUM 40 MG: 40 TABLET, DELAYED RELEASE ORAL at 15:42

## 2025-04-28 RX ADMIN — LIDOCAINE 4% 2 PATCH: 40 PATCH TOPICAL at 09:33

## 2025-04-28 RX ADMIN — ATORVASTATIN CALCIUM 20 MG: 20 TABLET, FILM COATED ORAL at 09:33

## 2025-04-28 RX ADMIN — METRONIDAZOLE 500 MG: 500 TABLET ORAL at 15:42

## 2025-04-28 RX ADMIN — METHOCARBAMOL 500 MG: 500 TABLET ORAL at 06:00

## 2025-04-28 RX ADMIN — LOSARTAN POTASSIUM 50 MG: 50 TABLET, FILM COATED ORAL at 09:33

## 2025-04-28 RX ADMIN — SUCRALFATE ORAL SUSPENSION 1 G: 1 SUSPENSION ORAL at 05:52

## 2025-04-28 RX ADMIN — PANTOPRAZOLE SODIUM 40 MG: 40 TABLET, DELAYED RELEASE ORAL at 05:52

## 2025-04-28 RX ADMIN — OXYCODONE HYDROCHLORIDE 5 MG: 5 TABLET ORAL at 09:33

## 2025-04-28 RX ADMIN — METRONIDAZOLE 500 MG: 500 TABLET ORAL at 05:52

## 2025-04-28 RX ADMIN — SUCRALFATE ORAL SUSPENSION 1 G: 1 SUSPENSION ORAL at 12:54

## 2025-04-28 ASSESSMENT — COGNITIVE AND FUNCTIONAL STATUS - GENERAL
DAILY ACTIVITIY SCORE: 24
MOBILITY SCORE: 24

## 2025-04-28 ASSESSMENT — PAIN - FUNCTIONAL ASSESSMENT: PAIN_FUNCTIONAL_ASSESSMENT: 0-10

## 2025-04-28 ASSESSMENT — PAIN DESCRIPTION - LOCATION: LOCATION: ABDOMEN

## 2025-04-28 ASSESSMENT — PAIN SCALES - GENERAL: PAINLEVEL_OUTOF10: 8

## 2025-04-28 NOTE — PROGRESS NOTES
"Subjective   Interval History: Reports intermittent abdominal pain which is relieve by pain medication. Appetite has returned and tolerating PO intake. Having normal Bms.     Objective   Culture from drain placement with PMNs, no growth to date.   Vital signs in last 24 hours:  /69 (BP Location: Right arm, Patient Position: Lying)   Pulse 70   Temp 36.6 °C (97.9 °F) (Temporal)   Resp 18   Wt 89.8 kg (197 lb 15.6 oz)   SpO2 97%     Intake/Output last 3 shifts:  I/O last 3 completed shifts:  In: 300 (3.3 mL/kg) [P.O.:300]  Out: 30 (0.3 mL/kg) [Drains:30]  Weight: 89.8 kg   Intake/Output this shift:  No intake/output data recorded.    Physical Exam  Neuro: oriented to person, place, time, and general circumstances  HEENT: normocephalic, atraumatic  Pulm: clear to auscultation bilaterally, no wheezes, good air entry  Cardiac: Regular rate and rhythm or without murmur or extra heart sounds  Abdomen:  soft, tender to the touch around the drain insertion site. Site appears C/D/I.     Relevant Results  LABS:  Lab Results   Component Value Date    WBC 12.8 (H) 04/28/2025    HGB 11.3 (L) 04/28/2025    HCT 34.5 (L) 04/28/2025    MCV 92 04/28/2025     04/28/2025      Results from last 72 hours   Lab Units 04/28/25  0445   SODIUM mmol/L 137   POTASSIUM mmol/L 3.6   CHLORIDE mmol/L 110*   CO2 mmol/L 20*   BUN mg/dL 13   CREATININE mg/dL 0.71   GLUCOSE mg/dL 108*   CALCIUM mg/dL 8.8   ANION GAP mmol/L 11   EGFR mL/min/1.73m*2 >90               No lab exists for component: \"PT\"    MICRO:  Susceptibility data from last 14 days.  Collected Specimen Info Organism Ampicillin/Sulbactam Ceftriaxone Clindamycin Meropenem Penicillin   04/18/25 Fluid from BILE FLUID Clostridium perfringens  S  S  S  S  S       IMAGING:  CT abdomen pelvis wo IV contrast   Final Result   6 days postop from cholecystectomy with mild enlargement of fluid   collection within the gallbladder fossa as described. Enlarging   biloma versus abscess. "        Stable surgical changes related to fundoplication, Nadja-en-Y gastric   bypass, and apparently also a previous gastric sleeve bariatric   surgery. Please correlate with surgical history.        Previous hysterectomy.        Stable band like atelectasis at the posterior left lung base. Tiny   right pleural effusion. Combination of atelectasis and pneumonia in   the right lower lobe, mildly improved.        MACRO:   None        Signed by: Dane Sheldon 4/24/2025 2:43 PM   Dictation workstation:   [id number]      CT abdomen pelvis wo IV contrast   Final Result   1. Acute postsurgical change from cholecystectomy. Fluid collection   with few foci of gas within the surgical bed with minimal extension   along the anterior hepatic margin. Trace free intraperitoneal air is   seen along the anterior liver margin. Postsurgical change within the   abdominal wall without focal fluid collection.   2. Bibasilar atelectatic changes.   3. Mild inflammatory changes within the right lower quadrant in the   region of the appendix. Proximal appendix appears unremarkable while   the distal appendix is difficult to evaluate allowing for adjacent   small bowel loops and lack of contrast. Findings likely relate to   acute postsurgical changes within the abdomen. Serial exams   recommended.        MACRO:   None.        Signed by: Boyd Bill 4/21/2025 1:54 PM   Dictation workstation:   [id number]      CT abdomen pelvis wo IV contrast   Final Result   Severe acute cholecystitis with gallbladder dilatation, wall   thickening, pericholecystic fluid and gallstones. Hyperdense stone   seen in the cystic duct. Surgical consultation recommended.                       MACRO:   None.        Signed by: Dane Johns 4/18/2025 4:44 PM   Dictation workstation:   [id number]      US guided abscess fluid collection drainage    (Results Pending)       Assessment/Plan   This is a 54 year old female admitted with RUQ pain and decreased appetite  secondary to acute cholecystitis. Recent admission with N/V, and underwent an upper endoscopy where she was found to have a gastric ulcer.      Workup in the ED includes a CT scan that shows severe acute cholecystitis, cholelithiasis. S/p Percutaneous cholecystectomy 4/18/25 with findings of gangrenous acute cholecystitis. Delayed postop recovery, with increasing abdominal pain and leukocytosis.     Repeat CT completed 4/24 is significant for a 6cm fluid collection in the gb fossa with new finding of small locules of air concerning for abscess. She is s/p GB fossa abscess drain placement 4/25/25. Has been putting out about 30 mL per day. Drain has not been flushed.    Drain flushes without difficulty. Educated patient on drain care. Supplies given. RX sent to [facility] for flushes to be delivered from meds to beds.     She will follow up with surgery for timing of drain removal.     IR to sign off.      LOS: 9 days     VINCENT De La Cruz-CNP      I personally spent over half of a total 35 minutes in counseling and discussion with the patient and coordination of care as described above.

## 2025-04-28 NOTE — PROGRESS NOTES
Yuni Jordan is a 54 y.o. female on day 9 of admission presenting with Acute cholecystitis.      Subjective   CRYSTAL. Pain controlled. Good appetite      Objective     PE:  Constitutional: A&Ox3, calm and cooperative, NAD  Eyes: EOMI, clear sclera   Cardiovascular: Normal rate and regular rhythm  Respiratory/Thorax: diminished effort B/L  Gastrointestinal: abd soft, appropriately tender, lap sites CDI, drain with blood tinged OP  Genitourinary: Voiding independently   Extremities: No peripheral edema  Neurological: A&Ox3, No focal deficits   Psychological: Appropriate mood and behavior      Last Recorded Vitals  Vitals:    04/27/25 1637 04/28/25 0000 04/28/25 0435 04/28/25 0908   BP: 130/72 123/58 127/79 131/69   BP Location: Right arm Left arm Right arm Right arm   Patient Position: Lying Lying Sitting Lying   Pulse: 72 79 79 70   Resp: 16 16 16 18   Temp: 36.8 °C (98.2 °F) 36.1 °C (97 °F) 36 °C (96.8 °F) 36.6 °C (97.9 °F)   TempSrc: Temporal Temporal Temporal Temporal   SpO2: 95% 96% 97% 97%   Weight:       Height:             Relevant Results    Imaging:     .=== 09/16/24 ===    XR ANKLE 3+ VIEWS RIGHT    - Impression -  As above.    MACRO:  None.    Signed by: Hanane Motta 9/17/2024 10:13 PM  Dictation workstation:   [id number]   .=== 04/18/25 ===    CT ABDOMEN PELVIS WO IV CONTRAST    - Impression -  6 days postop from cholecystectomy with mild enlargement of fluid  collection within the gallbladder fossa as described. Enlarging  biloma versus abscess.    Stable surgical changes related to fundoplication, Nadja-en-Y gastric  bypass, and apparently also a previous gastric sleeve bariatric  surgery. Please correlate with surgical history.    Previous hysterectomy.    Stable band like atelectasis at the posterior left lung base. Tiny  right pleural effusion. Combination of atelectasis and pneumonia in  the right lower lobe, mildly improved.    MACRO:  None    Signed by: Dane Sheldon 4/24/2025 2:43  PM  Dictation workstation:   [id number]         Lab Results:   Lab Results   Component Value Date    WBC 12.8 (H) 04/28/2025    HGB 11.3 (L) 04/28/2025    HCT 34.5 (L) 04/28/2025    MCV 92 04/28/2025     04/28/2025     Lab Results   Component Value Date    GLUCOSE 108 (H) 04/28/2025    CALCIUM 8.8 04/28/2025     04/28/2025    K 3.6 04/28/2025    CO2 20 (L) 04/28/2025     (H) 04/28/2025    BUN 13 04/28/2025    CREATININE 0.71 04/28/2025           Estimated Creatinine Clearance: 95.5 mL/min (by C-G formula based on SCr of 0.71 mg/dL).  CRP   Date/Time Value Ref Range Status   03/17/2021 10:00 AM 0.26 mg/dL Final     Comment:     REF VALUE  < 1.00           Assessment/Plan   Yuni Jordan is a 54 y.o. female on day 9 of admission presenting with Acute cholecystitis.    POD10 lap daria with Dr. Walsh  Intra-op findings: Gangrenous acute cholecystitis   Post-op course complicated by persistent pain and leukocytosis, CT yesterday shows developing fluid collection in GB fossa possibly biloma vs abscess S/P IR drainage 4/25      Okay to discharge today with PO abx and short script of pain meds. Maintain drain upon discharge, patient will follow up with Dr. Walsh in aprx 10 days for post-op check and drain management.       I spent 35 minutes in the professional and overall care of this patient.         Edel Leal PA-C

## 2025-04-28 NOTE — PROGRESS NOTES
"  INFECTIOUS DISEASE DAILY PROGRESS NOTE    SUBJECTIVE:    Feeling better overall. WBC stable, down a bit to 12.8. Some abd pain but seems mostly from the drain itself. No fevers. Tolerating PO abx fine. She had lost IV access over the weekend so I changed IV Cipro/Flagyl to PO    OBJECTIVE:  VITALS (Last 24 Hours)  /69 (BP Location: Right arm, Patient Position: Lying)   Pulse 70   Temp 36.6 °C (97.9 °F) (Temporal)   Resp 18   Ht 1.575 m (5' 2.01\")   Wt 89.8 kg (197 lb 15.6 oz)   SpO2 97%   BMI 36.20 kg/m²     PHYSICAL EXAM:  Gen - NAD  Abd - drain in the RUQ with some bilious output, no blood  Skin - no rash    ABX: PO Cipro/Flagyl    LABS:  Lab Results   Component Value Date    WBC 12.8 (H) 04/28/2025    HGB 11.3 (L) 04/28/2025    HCT 34.5 (L) 04/28/2025    MCV 92 04/28/2025     04/28/2025     Lab Results   Component Value Date    GLUCOSE 108 (H) 04/28/2025    CALCIUM 8.8 04/28/2025     04/28/2025    K 3.6 04/28/2025    CO2 20 (L) 04/28/2025     (H) 04/28/2025    BUN 13 04/28/2025    CREATININE 0.71 04/28/2025         Estimated Creatinine Clearance: 95.5 mL/min (by C-G formula based on SCr of 0.71 mg/dL).    ASSESSMENT/PLAN:     Acute Cholecystitis s/p lap daria 4/28  GB Fossa Fluid Collection - s/p drain, looks more like biloma. Cx negative but was on abx.   Penicillin Allergy - limits abx options     PO Cipro 500mg BID and PO Flagyl 500mg TID for 10 more days. OK to go home from my perspective.     Monitoring for adverse effects of abx such as rash/itching/diarrhea - none.     Will sign off. Please call back with questions. Thanks!    Ac Fuentes MD  [facility]  Office #[phone]      "

## 2025-04-28 NOTE — CARE PLAN
The patient's goals for the shift include  remain free from falls    The clinical goals for the shift include Pain management

## 2025-04-28 NOTE — PROGRESS NOTES
04/28/25 0844   Discharge Planning   Home or Post Acute Services None   Expected Discharge Disposition Home   Does the patient need discharge transport arranged? No     Patient was transitioned to PO antibiotics after she lost IV access.  Will go home with PO antibiotics.  Patient has a drain but it has not been determined if she will go home with it.  IR and surgery following.  Plan remains for patient to return home upon discharge.  Will continue to follow for discharge planning needs.

## 2025-04-28 NOTE — NURSING NOTE

## 2025-04-29 ENCOUNTER — TELEPHONE (OUTPATIENT)
Dept: SURGERY | Facility: CLINIC | Age: 55
End: 2025-04-29
Payer: COMMERCIAL

## 2025-04-29 ENCOUNTER — PATIENT OUTREACH (OUTPATIENT)
Dept: PRIMARY CARE | Facility: CLINIC | Age: 55
End: 2025-04-29
Payer: COMMERCIAL

## 2025-04-29 NOTE — PROGRESS NOTES
"Discharge Facility:  [facility]   Discharge Diagnosis: acute cholecystitis; post op gallbladder fossa abscess; gastrojejunostomy ulcer; HTN; HLD; post op constipation  Admission Date: 4/18/25  Discharge Date:  4/28/25    PCP Appointment Date: TBD - tasked to PCP office  Specialist Appointment Date:  TBD - surgeon  Hospital Encounter and Summary Linked: Yes  ED to Hosp-Admission (Discharged) with Terry Gutierrez, DO; Susan Bell DO (04/18/2025)   See discharge assessment below for further details     Wrap Up  Wrap Up Additional Comments: Successful transitions of care outreach to patient. Patient reports she is doing okay, except that she is afraid to take the Cipro and afraid to lay down after she had some \"pauses\" in her breathing last night.  States she also had numbness in her hand and the side of her leg for about 30 minutes.  She has been upright since then and has had no additional symptoms.  She read that the Cipro can cause these symptoms when laying flat.  She left a message for the surgeon's office and there is a note from the MA that patient should go to ER as surgeon is in OR today and his assistant is out. Patient refusing ER.  States she feels okay as long as she is sitting up.  Message was sent to PCP to see if he has any recommendations.   Educated on signs and symptoms of infection to watch for. She denies difficulty managing the drain at home. She will call surgeon's office to schedule her follow up, understands she should be seen in ten days to see if drain can be removed.  Sending message to PCP office for assistance with appointment as there are none available in the next 14 days. Patient aware of my availability for additional questions or concerns. (4/29/2025  2:12 PM)    Engagement  Call Start Time: 1400 (4/29/2025  2:12 PM)    Medications  Medications reviewed with patient/caregiver?: Yes (4/29/2025  2:12 PM)  Is the patient having any side effects they believe may be caused by any medication " "additions or changes?: (!) Yes (Pt having \"pauses\" in breathing when she lays down, states she read this can be caused by Cipro) (4/29/2025  2:12 PM)  Does the patient have all medications ordered at discharge?: Yes (4/29/2025  2:12 PM)  Care Management Interventions: Provided patient education (4/29/2025  2:12 PM)  Prescription Comments: Start:   o acetaminophen 325 mg tablet; Commonly known as: Tylenol; Take 1 tablet   by mouth every 8 hours scheduled for next 1 week then take every 8 hours   as needed.  o ciprofloxacin 500 mg tablet; Commonly known as: Cipro; Take 1 tablet   (500 mg) by mouth every 12 hours for 10 days.  o Lidocaine Pain Relief 4 % patch; Generic drug: lidocaine; Place 2   patches over 12 hours on the skin once daily for 10 days. Remove & discard   patch within 12 hours or as directed by MD.; Start taking on: April 29, 2025  o methocarbamol 500 mg tablet; Commonly known as: Robaxin; Take 1 tablet   (500 mg) by mouth every 8 hours if needed for muscle spasms for up to 10   days.  o metroNIDAZOLE 500 mg tablet; Commonly known as: Flagyl; Take 1 tablet   (500 mg) by mouth every 8 hours for 10 days.  o Normal Saline Flush flush; Generic drug: sodium chloride 0.9%; 10 mL by   intra-catheter route 2 times a day for 14 days.  o oxyCODONE 5 mg immediate release tablet; Commonly known as: Roxicodone;   Take 1 tablet (5 mg) by mouth every 6 hours if needed for moderate pain (4   - 6) or severe pain (7 - 10).  o sucralfate 100 mg/mL suspension; Commonly known as: Carafate; Take 10 mL   (1 g) by mouth every 6 hours.    Stop: cyclobenzaprine (4/29/2025  2:12 PM)  Is the patient taking all medications as directed (includes completed medication regime)?: No (4/29/2025  2:12 PM)  What is preventing the patient from taking all medications as directed?: Side effects (she stopped taking the Cipro due to concern for possible side effect) (4/29/2025  2:12 PM)  Care Management Interventions: Notified provider; " Advised patient to call provider (Patient left a message for surgeon. RN CM sent message to PCP as surgeon is in OR today.) (4/29/2025  2:12 PM)  Medication Comments: Reviewed new/discontinued medications with patient. Concern regarding possible side effect of Cipro sent to PCP for recommendations.  Pt has no other questions or concerns regarding other medications. (4/29/2025  2:12 PM)    Appointments  Does the patient have a primary care provider?: Yes (4/29/2025  2:12 PM)  Care Management Interventions: Educated patient on importance of making appointment (4/29/2025  2:12 PM)  Has the patient kept scheduled appointments due by today?: Not applicable (4/29/2025  2:12 PM)    Self Management  Has home health visited the patient within 72 hours of discharge?: Not applicable (4/29/2025  2:12 PM)    Patient Teaching  Does the patient have access to their discharge instructions?: Yes (4/29/2025  2:12 PM)  Care Management Interventions: Reviewed instructions with patient (4/29/2025  2:12 PM)  What is the patient's perception of their health status since discharge?: Improving (4/29/2025  2:12 PM)  Is the patient/caregiver able to teach back the hierarchy of who to call/visit for symptoms/problems? PCP, Specialist, Home Health nurse, Urgent Care, ED, 911: Yes (4/29/2025  2:12 PM)

## 2025-04-29 NOTE — DISCHARGE SUMMARY
Discharge Diagnosis  Acute cholecystitis  Post op gallbladder fossa abscess  Gastrojejunostomy ulcer  HTN  HLD  Post op constipation    Issues Requiring Follow-Up  - check cbc, bmp in 1 week  - op follow up with PCP, Dr. Walsh (surgery)    Discharge Meds     Medication List      START taking these medications     acetaminophen 325 mg tablet; Commonly known as: Tylenol; Take 1 tablet   by mouth every 8 hours scheduled for next 1 week then take every 8 hours   as needed.   ciprofloxacin 500 mg tablet; Commonly known as: Cipro; Take 1 tablet   (500 mg) by mouth every 12 hours for 10 days.   Lidocaine Pain Relief 4 % patch; Generic drug: lidocaine; Place 2   patches over 12 hours on the skin once daily for 10 days. Remove & discard   patch within 12 hours or as directed by MD.; Start taking on: April 29, 2025   methocarbamol 500 mg tablet; Commonly known as: Robaxin; Take 1 tablet   (500 mg) by mouth every 8 hours if needed for muscle spasms for up to 10   days.   metroNIDAZOLE 500 mg tablet; Commonly known as: Flagyl; Take 1 tablet   (500 mg) by mouth every 8 hours for 10 days.   Normal Saline Flush flush; Generic drug: sodium chloride 0.9%; 10 mL by   intra-catheter route 2 times a day for 14 days.   oxyCODONE 5 mg immediate release tablet; Commonly known as: Roxicodone;   Take 1 tablet (5 mg) by mouth every 6 hours if needed for moderate pain (4   - 6) or severe pain (7 - 10).   sucralfate 100 mg/mL suspension; Commonly known as: Carafate; Take 10 mL   (1 g) by mouth every 6 hours.     CONTINUE taking these medications     atorvastatin 20 mg tablet; Commonly known as: Lipitor; TAKE 1 TABLET BY   MOUTH EVERY DAY   losartan 50 mg tablet; Commonly known as: Cozaar; Take 1 tablet (50 mg)   by mouth once daily.   multivitamin tablet   pantoprazole 40 mg EC tablet; Commonly known as: ProtoNix; Take 1 tablet   (40 mg) by mouth 2 times a day before meals for 30 days, THEN 1 tablet (40   mg) once daily in the morning. Take  before meals. Do not crush, chew, or   split.; Start taking on: April 16, 2025   ProAir HFA 90 mcg/actuation inhaler; Generic drug: albuterol   Tab-A-Narendra Multivitamin w-iron  mg-mcg tablet; Generic drug:   multivitamin with minerals; Take 1 tablet by mouth once daily.     STOP taking these medications     cyclobenzaprine 10 mg tablet; Commonly known as: Flexeril       Test Results Pending At Discharge  Pending Labs       Order Current Status    AFB Culture/Smear Preliminary result    Fungal Culture/Smear Preliminary result            Hospital Course   54 y.o. female, with a PMH ofHTN HLD   morbid obesity s/p Nadja-en-Y gastric bypass  h/of anastomotic ulcers  who presented to [facility] ED on 4/18/2025 for abdominal pain. She was jsut discharge last 4/16- after she initially presented  with n/v  and episodes of diarrhea. CT showed inflammation involving the jejunal portion of the gastrojejunostomy with potential ulceration. GI consulted and EGD showed gastrojejunostomy ulcer. DC home with PPI bid. She represented to [facility] with abdominal pain and continued trouble eating. CT was done showing the gallbladder is dilated with diffusely thickened wall. There is pericholecystic fat stranding and fluid. There is cholelithiasis. There is a gallstone in the neck the gallbladder. She was taken to OR s/p laparoscopic cholecystectomy. Intra-op findings of acute gangrenous cholecystitis , cholelithiasis. Post op course complicated by persistent pain. CT showed GB fossa fluid collection but WBC was down trending thus felt to be normal course. However, pain persisted and WBC luke. IR and ID consulted with concern for abscess. IR drained fluid collection. ID recommends PO Cipro/flagyl for 10 more days. Pain now improved. Plan to dc home with drain in place. IR educated patient on drain mgmt. Patient to follow up with Dr. Walsh in 10 days to see if drain can be removed.    Pertinent Physical Exam At Time of Discharge  Physical  Exam  Vitals and nursing note reviewed.   HENT:      Mouth/Throat:      Mouth: Mucous membranes are moist.      Pharynx: Oropharynx is clear.   Cardiovascular:      Rate and Rhythm: Normal rate and regular rhythm.   Pulmonary:      Effort: Pulmonary effort is normal.   Abdominal:      General: There is no distension.      Palpations: Abdomen is soft.      Tenderness: There is no abdominal tenderness.   Neurological:      Mental Status: She is alert and oriented to person, place, and time.         Outpatient Follow-Up  Future Appointments   Date Time Provider Department Center   5/14/2025  3:45 PM Shonda Cash MD [id number] None   5/23/2025  2:00 PM Jorje Kapadia MD [id number] [facility]     DISCHARGE TIME: > 30 minutes      Deep DO Matt

## 2025-04-29 NOTE — TELEPHONE ENCOUNTER
Fax received from answering service stating that patient has shortness of breath  and numbing in hand and leg. Please  call. Allergy to an antibiotic. Penicillin.   Return call to patient left message  for patient  to go to Emergency if she  is not already there. Informed patient that Dr. Walsh is in the OR and his assistant is out of the office today.

## 2025-04-29 NOTE — TELEPHONE ENCOUNTER
Follow up call to patient. She did not go to the ED. Patient states that these are side effects of the medication. She inquires if she should stop taking medication. Symptoms only occur when she lay down. Patient states that she was elevated in the hospital. Patient declined to go to ED. Pleased call patient. Told me to calm down. I informed patient that I could not give her advise on shortness of breath, numbing in hands and leg. She will need to get evaluation at ED. Patient still declined. Request call from Dr. Walsh [phone].

## 2025-04-30 LAB
ACID FAST STN SPEC: NORMAL
MYCOBACTERIUM SPEC CULT: NORMAL

## 2025-05-05 LAB
FUNGUS SPEC CULT: NORMAL
FUNGUS SPEC FUNGUS STN: NORMAL

## 2025-05-07 LAB
ACID FAST STN SPEC: NORMAL
MYCOBACTERIUM SPEC CULT: NORMAL

## 2025-05-09 ENCOUNTER — OFFICE VISIT (OUTPATIENT)
Dept: SURGERY | Facility: CLINIC | Age: 55
End: 2025-05-09
Payer: COMMERCIAL

## 2025-05-09 ENCOUNTER — APPOINTMENT (OUTPATIENT)
Dept: PRIMARY CARE | Facility: CLINIC | Age: 55
End: 2025-05-09
Payer: COMMERCIAL

## 2025-05-09 DIAGNOSIS — Z09 FOLLOW-UP EXAM: Primary | ICD-10-CM

## 2025-05-09 PROCEDURE — 3051F HG A1C>EQUAL 7.0%<8.0%: CPT | Performed by: SURGERY

## 2025-05-09 PROCEDURE — 99211 OFF/OP EST MAY X REQ PHY/QHP: CPT | Performed by: SURGERY

## 2025-05-09 PROCEDURE — 4010F ACE/ARB THERAPY RXD/TAKEN: CPT | Performed by: SURGERY

## 2025-05-09 ASSESSMENT — ENCOUNTER SYMPTOMS
OCCASIONAL FEELINGS OF UNSTEADINESS: 0
LOSS OF SENSATION IN FEET: 0
DEPRESSION: 0

## 2025-05-09 NOTE — LETTER
May 9, 2025     Jorje Kapadia MD  [address] [facility] [address]    Patient: Yuni Jordan   YOB: 1970   Date of Visit: 5/9/2025       Dear Dr. Jorje Kapadia MD:    Thank you for referring Yuni Jordan to me for evaluation. Below are my notes for this consultation.  If you have questions, please do not hesitate to call me. I look forward to following your patient along with you.       Sincerely,     Maycol Walsh MD      CC: No Recipients  ______________________________________________________________________________________      Ms. Jordan is a 54-year-old female with multiple medical conditions to include obesity she is status post Nadja-en-Y gastric bypass procedure in the past.  She was admitted to the hospital recently with severe right upper quadrant abdominal pain.  Workup found her to have acute cholecystitis, cholelithiasis.    She was taken to surgery on 4/18/2025 for laparoscopic cholecystectomy where she was found to have gangrenous acute cholecystitis.    Postoperatively she had issues with ongoing sepsis and CT scan showed fluid collection in the gallbladder fossa.  This was treated with ultrasound-guided percutaneous drain placed on 4/25.    She also had symptoms related to gastric ulcer for which she has been placed on PPIs.    She was discharged to home on 4/28.  She comes in today for follow-up    She is doing quite well at this point.  She is pleased with the outcome of the surgery.    Tolerating a regular diet, bowel movements are normal    She does have a pigtail drain and she brings a log.  The drain is putting out less than 15 cc of straw-colored fluid.      On exam patient looks well.  Her incisions are healing nicely.    I elected to remove her drain today without difficulty.  Dry sterile dressing was applied.  She is instructed to remove this after 3 days        Surgical Pathology Exam: [id number]  Order: [id number]   Collected 4/18/2025 18:55       Status:  Final result       Dx: Acute cholecystitis    Test Result Released: Yes (not seen)    0 Result Notes       Component  Resulting Agency   FINAL DIAGNOSIS   A. GALLBLADDER, CHOLECYSTECTOMY:   -- Gangrenous cholecystitis and cholelithiasis                Impression: Impression: 54-year-old female who had a prolonged hospitalization following laparoscopic cholecystectomy for acute gangrenous cholecystitis.    Postoperative fluid collection treated successfully with image guided percutaneous drain that was removed today    Follow-up with me as needed    Discussed increasing activity level    Follow-up with me as needed

## 2025-05-09 NOTE — PROGRESS NOTES
Ms. Jordan is a 54-year-old female with multiple medical conditions to include obesity she is status post Nadja-en-Y gastric bypass procedure in the past.  She was admitted to the hospital recently with severe right upper quadrant abdominal pain.  Workup found her to have acute cholecystitis, cholelithiasis.    She was taken to surgery on 4/18/2025 for laparoscopic cholecystectomy where she was found to have gangrenous acute cholecystitis.    Postoperatively she had issues with ongoing sepsis and CT scan showed fluid collection in the gallbladder fossa.  This was treated with ultrasound-guided percutaneous drain placed on 4/25.    She also had symptoms related to gastric ulcer for which she has been placed on PPIs.    She was discharged to home on 4/28.  She comes in today for follow-up    She is doing quite well at this point.  She is pleased with the outcome of the surgery.    Tolerating a regular diet, bowel movements are normal    She does have a pigtail drain and she brings a log.  The drain is putting out less than 15 cc of straw-colored fluid.      On exam patient looks well.  Her incisions are healing nicely.    I elected to remove her drain today without difficulty.  Dry sterile dressing was applied.  She is instructed to remove this after 3 days        Surgical Pathology Exam: [id number]  Order: [id number]   Collected 4/18/2025 18:55       Status: Final result       Dx: Acute cholecystitis    Test Result Released: Yes (not seen)    0 Result Notes       Component  Resulting Agency   FINAL DIAGNOSIS   A. GALLBLADDER, CHOLECYSTECTOMY:   -- Gangrenous cholecystitis and cholelithiasis                Impression: Impression: 54-year-old female who had a prolonged hospitalization following laparoscopic cholecystectomy for acute gangrenous cholecystitis.    Postoperative fluid collection treated successfully with image guided percutaneous drain that was removed today    Follow-up with me as needed    Discussed  increasing activity level    Follow-up with me as needed

## 2025-05-14 ENCOUNTER — APPOINTMENT (OUTPATIENT)
Facility: CLINIC | Age: 55
End: 2025-05-14
Payer: COMMERCIAL

## 2025-05-14 DIAGNOSIS — K28.3 ACUTE GASTROJEJUNAL ANASTOMOTIC ULCER: Primary | ICD-10-CM

## 2025-05-14 DIAGNOSIS — Z12.11 ENCOUNTER FOR SCREENING COLONOSCOPY: ICD-10-CM

## 2025-05-14 DIAGNOSIS — Z98.84 BARIATRIC SURGERY STATUS: ICD-10-CM

## 2025-05-14 LAB
ACID FAST STN SPEC: NORMAL
MYCOBACTERIUM SPEC CULT: NORMAL

## 2025-05-14 PROCEDURE — 99213 OFFICE O/P EST LOW 20 MIN: CPT | Performed by: STUDENT IN AN ORGANIZED HEALTH CARE EDUCATION/TRAINING PROGRAM

## 2025-05-14 PROCEDURE — 3051F HG A1C>EQUAL 7.0%<8.0%: CPT | Performed by: STUDENT IN AN ORGANIZED HEALTH CARE EDUCATION/TRAINING PROGRAM

## 2025-05-14 PROCEDURE — 4010F ACE/ARB THERAPY RXD/TAKEN: CPT | Performed by: STUDENT IN AN ORGANIZED HEALTH CARE EDUCATION/TRAINING PROGRAM

## 2025-05-14 ASSESSMENT — ENCOUNTER SYMPTOMS
NAUSEA: 0
CHILLS: 0
ABDOMINAL DISTENTION: 0
UNEXPECTED WEIGHT CHANGE: 0
APPETITE CHANGE: 1
BLOOD IN STOOL: 0
CONSTIPATION: 0
ANAL BLEEDING: 0
TROUBLE SWALLOWING: 0
FEVER: 0
ABDOMINAL PAIN: 1
VOMITING: 0
RECTAL PAIN: 0
DIARRHEA: 0

## 2025-05-14 NOTE — ASSESSMENT & PLAN NOTE
EGD 4/2025 with large non bleeding anastomotic ulcer. Now on Pantoprazole BID and Carafate  - can stop carafate  - c/w PPI BID  - reinforced continued smoking cessation  - repeat egd ~ 2 mo to confirm ulcer healing   - avoid NSAIDs

## 2025-05-14 NOTE — PROGRESS NOTES
Chief Complaint:  Chief Complaint   Patient presents with    Peptic Ulcer Disease     Hx of Nadja en Y bypass.   Called for f.up of hospitalization in April with abdominal pain, EGD done with anastomotic ulcer, returned to ED with persistent pain and found to have gangrenous acute cholecystitis s/p CCY. Just saw  Friday with drain removed.     Still with soreness in her abdomen right sided, with laughing, coughing    Prior to hospitalization was on omeprazole but it wasn't working, had epigastric pain, so switched to another medications.   Using pantoprazole twice a day  Carafate q 6 hours   No real epigastric pain, she is modifying her diet since her gallbladder surgery     Denies melena   No NSAIDs   Quit Smoking since hospital discharge.      Prior egd/colon 2021 with   Colonoscopy with hyperplastic polyps recall 10 yrs     Virtual or Telephone Consent    An interactive audio and video telecommunication system which permits real time communications between the patient (at the originating site) and provider (at the distant site) was utilized to provide this telehealth service.   Verbal consent was requested and obtained from Yuni Jordan on this date, 05/14/25 for a telehealth visit and the patient's location was confirmed at the time of the visit.             Review of Systems   Constitutional:  Positive for appetite change. Negative for chills, fever and unexpected weight change.   HENT:  Negative for trouble swallowing.    Cardiovascular:  Negative for chest pain.   Gastrointestinal:  Positive for abdominal pain. Negative for abdominal distention, anal bleeding, blood in stool, constipation, diarrhea, nausea, rectal pain and vomiting.     Family History[1]    Medications  Current Medications[2]    Vitals  There were no vitals taken for this visit.    Physical Exam  Constitutional:       General: She is not in acute distress.     Appearance: Normal appearance.   HENT:      Head:  "Normocephalic.      Nose: Nose normal.   Eyes:      General: No scleral icterus.     Extraocular Movements: Extraocular movements intact.      Conjunctiva/sclera: Conjunctivae normal.   Pulmonary:      Effort: Pulmonary effort is normal.   Skin:     General: Skin is warm.      Coloration: Skin is not jaundiced.   Neurological:      Mental Status: She is alert.   Psychiatric:         Mood and Affect: Mood normal.           Labs:  No results found for: \"AFP\"No results found for: \"ASMAB\", \"MITOAB\"No results found for: \"CANDY\"No results found for: \"ASMAB\", \"MITOAB\"  Lab Results   Component Value Date    IIREXLYU17 1,459 (H) 02/11/2023     Lab Results   Component Value Date    HEPCAB NONREACTIVE 02/19/2021     Lab Results   Component Value Date    HEPCAB NONREACTIVE 02/19/2021   No results found for: \"HIV1X2\"  Lab Results   Component Value Date    IRON 66 03/17/2021    TIBC 403 03/17/2021    FERRITIN 66 03/17/2021     Lab Results   Component Value Date    INR 1.6 (H) 04/25/2025    INR 1.2 (H) 11/30/2018    INR 1.1 08/05/2018    PROTIME 17.2 (H) 04/25/2025    PROTIME 13.4 (H) 11/30/2018    PROTIME 12.4 08/05/2018     Lab Results   Component Value Date    TSH 2.68 02/11/2023       Radiology  Esophagogastroduodenoscopy (EGD)  Table formatting from the original result was not included.  Impression  The upper third of the esophagus, middle third of the esophagus and lower   third of the esophagus appeared normal. Performed random biopsy to rule   out eosinophilic esophagitis.  Irregular Z-line  The gastric pouch and gastric suture site appeared normal. Performed   random biopsy to rule out H. pylori.  The cardia appeared normal.  Single ulcer in the gastrojejunostomy with clean base (Fabio III)  The jejunum appeared normal.    Findings  The upper third of the esophagus, middle third of the esophagus and lower   third of the esophagus appeared normal. Performed random biopsy using   biopsy forceps to rule out eosinophilic " esophagitis.  Irregular Z-line 24 cm from the incisors  The gastric pouch and gastric suture site appeared normal. Performed   random biopsy using biopsy forceps to rule out H. pylori.  The cardia appeared normal.  Single large, superficial, benign-appearing ulcer in the gastrojejunostomy   with clean base (Fabio III)  The jejunum appeared normal.    Recommendation  Await pathology results   Repeat EGD in 2 months, due: 6/15/2025   Follow up with me in clinic   Pantoprazole BID for 1 month then daily indefinitely   Smoking cessation  Avoid NSAIDs  Return to hospital floor for ongoing care. See inpatient care note for   full post procedure recommendations.         Indication  Epigastric pain, History of Nadja-en-Y gastric bypass    Post-Op Diagnosis  Acute anastomotic ulcer    Staff  Staff Role   hSonda Cash MD Proceduralist     Medications  See Anesthesia Record.     Preprocedure  A history and physical has been performed, and patient medication   allergies have been reviewed. The patient's tolerance of previous   anesthesia has been reviewed. The risks and benefits of the procedure and   the sedation options and risks were discussed with the patient. All   questions were answered and informed consent obtained.    Details of the Procedure  The patient underwent monitored anesthesia care, which was administered by   an anesthesia professional. The patient's blood pressure, ECG, ETCO2,   heart rate, level of consciousness, oxygen and respirations were monitored   throughout the procedure. The scope was introduced through the mouth and   advanced to the jejunum. Retroflexion was performed in the cardia. Prior   to the procedure, the patient's H. Pylori status was unknown. The patient   experienced no blood loss. The procedure was not difficult. The patient   tolerated the procedure well. There were no apparent adverse events.     Events  Procedure Events   Event Event Time   ENDO SCOPE IN TIME 4/16/2025 10:58  AM   ENDO SCOPE OUT TIME 4/16/2025 11:08 AM     Specimens  ID Type Source Tests Collected by Time   1 : Gastric pouch, cold biopsy Tissue POUCH BIOPSY SURGICAL PATHOLOGY EXAM   Johana Brumfield RN 4/16/2025 1104   2 : cold bx Tissue ESOPHAGUS DISTAL BIOPSY SURGICAL PATHOLOGY EXAM Johana Brumfield RN 4/16/2025 1106   3 : cold bx Tissue ESOPHAGUS PROXIMAL BIOPSY SURGICAL PATHOLOGY EXAM   Johana Brumfield RN 4/16/2025 1107     Procedure Location  [address] [address]  [phone]    Referring Provider  VINCENT Eddy-CNP    Procedure Provider  Shonda Cash MD      A/P   Yuni was seen today for peptic ulcer disease.  Diagnoses and all orders for this visit:  Acute gastrojejunal anastomotic ulcer (Primary)  -     Esophagogastroduodenoscopy (EGD); Future  Bariatric surgery status  -     Esophagogastroduodenoscopy (EGD); Future  Encounter for screening colonoscopy     Problem List Items Addressed This Visit           ICD-10-CM    Bariatric surgery status Z98.84    Relevant Orders    Esophagogastroduodenoscopy (EGD)    Acute gastrojejunal anastomotic ulcer - Primary K28.3    EGD 4/2025 with large non bleeding anastomotic ulcer. Now on Pantoprazole BID and Carafate  - can stop carafate  - c/w PPI BID  - reinforced continued smoking cessation  - repeat egd ~ 2 mo to confirm ulcer healing   - avoid NSAIDs          Relevant Orders    Esophagogastroduodenoscopy (EGD)    Encounter for screening colonoscopy Z12.11    Due 2031               [1]   Family History  Problem Relation Name Age of Onset    Hypertension Mother Erin Jordan     Cancer Mother Erin Jordan     Diabetes Mother Erin Jordan     Asthma Mother Erin Jordan     Heart failure Mother Erin Jordan     Stroke Mother Erin Jordan     Hypertension Father Juan M Jordan     Heart failure Father Juan M Jordan     Asthma Father Juan M Jordan     Heart disease Father Juan M Jordan     Breast  cancer Mother's Sister  77   [2]   Current Outpatient Medications:     acetaminophen (Tylenol) 325 mg tablet, Take 1 tablet by mouth every 8 hours scheduled for next 1 week then take every 8 hours as needed., Disp: 90 tablet, Rfl: 0    albuterol (ProAir HFA) 90 mcg/actuation inhaler, Inhale 1-2 puffs. Every 4 to 6 hours as needed, Disp: , Rfl:     atorvastatin (Lipitor) 20 mg tablet, TAKE 1 TABLET BY MOUTH EVERY DAY, Disp: 90 tablet, Rfl: 1    losartan (Cozaar) 50 mg tablet, Take 1 tablet (50 mg) by mouth once daily., Disp: 30 tablet, Rfl: 0    methocarbamol (Robaxin) 500 mg tablet, Take 1 tablet (500 mg) by mouth every 8 hours if needed for muscle spasms for up to 10 days., Disp: 30 tablet, Rfl: 0    multivitamin tablet, Take 1 tablet by mouth once daily., Disp: , Rfl:     multivitamin with minerals tablet, Take 1 tablet by mouth once daily., Disp: 30 tablet, Rfl: 0    oxyCODONE (Roxicodone) 5 mg immediate release tablet, Take 1 tablet (5 mg) by mouth every 6 hours if needed for moderate pain (4 - 6) or severe pain (7 - 10). (Patient not taking: Reported on 5/9/2025), Disp: 10 tablet, Rfl: 0    pantoprazole (ProtoNix) 40 mg EC tablet, Take 1 tablet (40 mg) by mouth 2 times a day before meals for 30 days, THEN 1 tablet (40 mg) once daily in the morning. Take before meals. Do not crush, chew, or split., Disp: 90 tablet, Rfl: 0    sucralfate (Carafate) 100 mg/mL suspension, Take 10 mL (1 g) by mouth every 6 hours., Disp: 1260 mL, Rfl: 0

## 2025-05-21 ENCOUNTER — HOSPITAL ENCOUNTER (OUTPATIENT)
Dept: RADIOLOGY | Facility: CLINIC | Age: 55
Discharge: HOME | End: 2025-05-21
Payer: COMMERCIAL

## 2025-05-21 VITALS — HEIGHT: 62 IN | BODY MASS INDEX: 36.43 KG/M2 | WEIGHT: 197.97 LBS

## 2025-05-21 DIAGNOSIS — Z12.31 ENCOUNTER FOR SCREENING MAMMOGRAM FOR MALIGNANT NEOPLASM OF BREAST: ICD-10-CM

## 2025-05-21 LAB
ACID FAST STN SPEC: NORMAL
MYCOBACTERIUM SPEC CULT: NORMAL

## 2025-05-21 PROCEDURE — 77063 BREAST TOMOSYNTHESIS BI: CPT | Performed by: RADIOLOGY

## 2025-05-21 PROCEDURE — 77067 SCR MAMMO BI INCL CAD: CPT | Performed by: RADIOLOGY

## 2025-05-21 PROCEDURE — 77067 SCR MAMMO BI INCL CAD: CPT

## 2025-05-23 ENCOUNTER — APPOINTMENT (OUTPATIENT)
Dept: PRIMARY CARE | Facility: CLINIC | Age: 55
End: 2025-05-23
Payer: COMMERCIAL

## 2025-05-28 LAB
ACID FAST STN SPEC: NORMAL
MYCOBACTERIUM SPEC CULT: NORMAL

## 2025-06-04 LAB
ACID FAST STN SPEC: NORMAL
MYCOBACTERIUM SPEC CULT: NORMAL

## 2025-06-11 LAB
ACID FAST STN SPEC: NORMAL
MYCOBACTERIUM SPEC CULT: NORMAL

## 2025-06-16 ENCOUNTER — APPOINTMENT (OUTPATIENT)
Dept: PRIMARY CARE | Facility: CLINIC | Age: 55
End: 2025-06-16
Payer: COMMERCIAL

## 2025-06-16 VITALS — BODY MASS INDEX: 35.11 KG/M2 | WEIGHT: 192 LBS

## 2025-06-16 DIAGNOSIS — F17.210 CIGARETTE NICOTINE DEPENDENCE WITHOUT COMPLICATION: ICD-10-CM

## 2025-06-16 DIAGNOSIS — I10 BENIGN ESSENTIAL HYPERTENSION: ICD-10-CM

## 2025-06-16 DIAGNOSIS — K63.3 INTESTINAL ULCERATION: ICD-10-CM

## 2025-06-16 DIAGNOSIS — I10 HYPERTENSION, UNCONTROLLED: ICD-10-CM

## 2025-06-16 DIAGNOSIS — Z72.0 TOBACCO ABUSE: ICD-10-CM

## 2025-06-16 DIAGNOSIS — Z00.00 HEALTH CARE MAINTENANCE: Primary | ICD-10-CM

## 2025-06-16 DIAGNOSIS — F41.8 DEPRESSION WITH ANXIETY: ICD-10-CM

## 2025-06-16 DIAGNOSIS — E11.9 DIABETES MELLITUS TYPE 2, DIET-CONTROLLED: ICD-10-CM

## 2025-06-16 DIAGNOSIS — E78.2 MIXED HYPERLIPIDEMIA: ICD-10-CM

## 2025-06-16 LAB — POC FINGERSTICK BLOOD GLUCOSE: 107 MG/DL (ref 70–100)

## 2025-06-16 PROCEDURE — 3051F HG A1C>EQUAL 7.0%<8.0%: CPT | Performed by: INTERNAL MEDICINE

## 2025-06-16 PROCEDURE — 4004F PT TOBACCO SCREEN RCVD TLK: CPT | Performed by: INTERNAL MEDICINE

## 2025-06-16 PROCEDURE — 4010F ACE/ARB THERAPY RXD/TAKEN: CPT | Performed by: INTERNAL MEDICINE

## 2025-06-16 PROCEDURE — 82962 GLUCOSE BLOOD TEST: CPT | Performed by: INTERNAL MEDICINE

## 2025-06-16 PROCEDURE — 99214 OFFICE O/P EST MOD 30 MIN: CPT | Performed by: INTERNAL MEDICINE

## 2025-06-16 RX ORDER — PANTOPRAZOLE SODIUM 40 MG/1
TABLET, DELAYED RELEASE ORAL
Qty: 90 TABLET | Refills: 0 | Status: SHIPPED | OUTPATIENT
Start: 2025-06-16 | End: 2025-08-15

## 2025-06-16 RX ORDER — METFORMIN HYDROCHLORIDE 500 MG/1
500 TABLET ORAL
Qty: 60 TABLET | Refills: 3 | Status: SHIPPED | OUTPATIENT
Start: 2025-06-16 | End: 2026-07-21

## 2025-06-16 RX ORDER — PANTOPRAZOLE SODIUM 40 MG/1
TABLET, DELAYED RELEASE ORAL
Qty: 90 TABLET | Refills: 3 | Status: SHIPPED | OUTPATIENT
Start: 2025-06-16 | End: 2025-06-17

## 2025-06-16 RX ORDER — LOSARTAN POTASSIUM 50 MG/1
50 TABLET ORAL DAILY
Qty: 90 TABLET | Refills: 3 | Status: SHIPPED | OUTPATIENT
Start: 2025-06-16 | End: 2025-07-16

## 2025-06-16 RX ORDER — ATORVASTATIN CALCIUM 20 MG/1
20 TABLET, FILM COATED ORAL DAILY
Qty: 90 TABLET | Refills: 3 | Status: SHIPPED | OUTPATIENT
Start: 2025-06-16

## 2025-06-16 ASSESSMENT — ENCOUNTER SYMPTOMS
CHEST TIGHTNESS: 0
DIZZINESS: 0
BRUISES/BLEEDS EASILY: 0
SEIZURES: 0
SINUS PRESSURE: 0
APPETITE CHANGE: 0
EYE REDNESS: 0
DIARRHEA: 1
SLEEP DISTURBANCE: 0
VOICE CHANGE: 0
WHEEZING: 0
DIAPHORESIS: 0
RECTAL PAIN: 0
NAUSEA: 0
BLOOD IN STOOL: 0
CHOKING: 0
EYE ITCHING: 0
NUMBNESS: 0
JOINT SWELLING: 0
NECK STIFFNESS: 0
CONSTIPATION: 0
CHILLS: 0
POLYDIPSIA: 0
NECK PAIN: 0
ABDOMINAL PAIN: 1
PHOTOPHOBIA: 0
ANAL BLEEDING: 0
HEADACHES: 0
PALPITATIONS: 0
SHORTNESS OF BREATH: 0
BACK PAIN: 1
EYE DISCHARGE: 0
WOUND: 0
SINUS PAIN: 0
ACTIVITY CHANGE: 0
HEMATURIA: 0
ARTHRALGIAS: 0
FACIAL ASYMMETRY: 0
VOMITING: 0
LIGHT-HEADEDNESS: 0
DIFFICULTY URINATING: 0
COLOR CHANGE: 0
EYE PAIN: 0
SORE THROAT: 0
POLYPHAGIA: 0
STRIDOR: 0
FREQUENCY: 0
ABDOMINAL DISTENTION: 0
FACIAL SWELLING: 0
SPEECH DIFFICULTY: 0
WEAKNESS: 0
ADENOPATHY: 0
RHINORRHEA: 0
TROUBLE SWALLOWING: 0
TREMORS: 0
DYSURIA: 0
FLANK PAIN: 0
COUGH: 0
MYALGIAS: 0
FATIGUE: 0

## 2025-06-16 ASSESSMENT — PATIENT HEALTH QUESTIONNAIRE - PHQ9
SUM OF ALL RESPONSES TO PHQ9 QUESTIONS 1 AND 2: 2
1. LITTLE INTEREST OR PLEASURE IN DOING THINGS: SEVERAL DAYS
2. FEELING DOWN, DEPRESSED OR HOPELESS: SEVERAL DAYS

## 2025-06-16 ASSESSMENT — PAIN SCALES - GENERAL: PAINLEVEL_OUTOF10: 4

## 2025-06-16 NOTE — PROGRESS NOTES
Subjective   Patient ID: Yuni Jordan is a 55 y.o. female who presents for No chief complaint on file..  History of Present Illness  The patient presents for evaluation of hypertension, hyperlipidemia, diabetes, abdominal pain, tobacco use, depression/anxiety, anemia, and GERD.    She has been managing hypertension with losartan but requires a new prescription as she has exhausted her current supply. She reports no recent episodes of headaches or dizziness.    She has not been adhering to her prescribed atorvastatin regimen since her last hospital discharge. She reports no musculoskeletal pain but mentions persistent back discomfort that limits her ability to exercise.    She does not monitor blood glucose levels at home but did so regularly during her hospital stay, where her blood glucose level was recorded as 72. She is interested in trying one of the injections for diabetes management. She reports no sinus-related issues, chest pain, or shortness of breath. However, she does experience occasional nausea and diarrhea.    She has a history of a small hernia and an ulcer, for which she was previously treated. She is currently on pantoprazole and requires a refill of this medication. She is scheduled for an EGD on Wednesday.    She has been experiencing feelings of depression, particularly when alone, but does not require medication at this time. She has a family history of anxiety and depression in her mother and grandmother.    She has been informed of low iron levels and expresses concern about potential anemia. She reports no recent episodes of headaches or dizziness.    She has been making efforts to increase her food intake, which has resulted in some weight loss. She has undergone a mammogram and colonoscopy in the past few years. She has received the COVID-19 vaccine but declines the influenza, pneumonia, and shingles vaccines due to previous adverse reactions. She has had a hysterectomy and is seeking a  referral to a gynecologist.    She had quit smoking for 6 weeks but resumed due to stress related to her father's potential cancer diagnosis.    PAST SURGICAL HISTORY:  Hysterectomy    SOCIAL HISTORY  The patient admits to smoking.    FAMILY HISTORY  Her father may have cancer.  Her mother had anxiety and depression.  Her grandmother also had anxiety and depression.      PMHx, FHx, Social Hx, Surg Hx personally reviewed at this appointment. No pertinent findings and/or changes from prior (if applicable).    ROS: Unless specified above, pt denies wt gain/loss f/c HA LoC CP SOB NVDC. See HPI above, and scanned sheet (if applicable). All other systems are reviewed and are without complaint.   Review of Systems   Constitutional:  Negative for activity change, appetite change, chills, diaphoresis and fatigue.   HENT:  Negative for congestion, dental problem, drooling, ear discharge, ear pain, facial swelling, hearing loss, mouth sores, nosebleeds, postnasal drip, rhinorrhea, sinus pressure, sinus pain, sneezing, sore throat, tinnitus, trouble swallowing and voice change.    Eyes:  Negative for photophobia, pain, discharge, redness, itching and visual disturbance.   Respiratory:  Negative for cough, choking, chest tightness, shortness of breath, wheezing and stridor.    Cardiovascular:  Negative for chest pain, palpitations and leg swelling.   Gastrointestinal:  Positive for abdominal pain and diarrhea. Negative for abdominal distention, anal bleeding, blood in stool, constipation, nausea, rectal pain and vomiting.   Endocrine: Negative for cold intolerance, heat intolerance, polydipsia, polyphagia and polyuria.   Genitourinary:  Negative for decreased urine volume, difficulty urinating, dysuria, enuresis, flank pain, frequency, genital sores, hematuria and urgency.   Musculoskeletal:  Positive for back pain. Negative for arthralgias, gait problem, joint swelling, myalgias, neck pain and neck stiffness.   Skin:  Negative  for color change, pallor, rash and wound.   Neurological:  Negative for dizziness, tremors, seizures, syncope, facial asymmetry, speech difficulty, weakness, light-headedness, numbness and headaches.   Hematological:  Negative for adenopathy. Does not bruise/bleed easily.   Psychiatric/Behavioral:  Negative for sleep disturbance.       Objective     There were no vitals taken for this visit.     Physical Exam      Physical Exam  Constitutional:       Appearance: Normal appearance.   Cardiovascular:      Rate and Rhythm: Normal rate and regular rhythm.      Heart sounds: No murmur heard.     No gallop.   Pulmonary:      Effort: No respiratory distress.      Breath sounds: No wheezing or rales.   Abdominal:      General: There is no distension.      Palpations: There is no mass.      Tenderness: There is no abdominal tenderness. There is no guarding.   Musculoskeletal:      Right lower leg: No edema.      Left lower leg: No edema.   Neurological:      Mental Status: She is alert.        Lab Results   Component Value Date    WBC 12.8 (H) 04/28/2025    HGB 11.3 (L) 04/28/2025    HCT 34.5 (L) 04/28/2025     04/28/2025    CHOL 177 02/11/2023    TRIG 93 02/11/2023    HDL 49.5 02/11/2023    ALT 14 04/24/2025    AST 14 04/24/2025     04/28/2025    K 3.6 04/28/2025     (H) 04/28/2025    CREATININE 0.71 04/28/2025    BUN 13 04/28/2025    CO2 20 (L) 04/28/2025    TSH 2.68 02/11/2023    INR 1.6 (H) 04/25/2025    HGBA1C 7.0 (A) 04/15/2025     par     Current Outpatient Medications   Medication Instructions    albuterol (ProAir HFA) 90 mcg/actuation inhaler 1-2 puffs    atorvastatin (Lipitor) 20 mg tablet TAKE 1 TABLET BY MOUTH EVERY DAY    losartan (COZAAR) 50 mg, oral, Daily    methocarbamol (ROBAXIN) 500 mg, oral, Every 8 hours PRN    multivitamin tablet 1 tablet, Daily    multivitamin with minerals tablet 1 tablet, oral, Daily    pantoprazole (ProtoNix) 40 mg EC tablet Take 1 tablet (40 mg) by mouth 2 times  a day before meals for 30 days, THEN 1 tablet (40 mg) once daily in the morning. Take before meals. Do not crush, chew, or split.        BI mammo bilateral screening tomosynthesis  Narrative: Interpreted By:  Carlyn Harris,   STUDY:  BI MAMMO BILATERAL SCREENING TOMOSYNTHESIS;  5/21/2025 4:29 pm      ACCESSION NUMBER(S):  [id number]      ORDERING CLINICIAN:  MATTHEW GRACE      INDICATION:  Screening.      ,Z12.31 Encounter for screening mammogram for malignant neoplasm of  breast      COMPARISON:  10/10/2023, 03/04/2021, 04/30/2018      FINDINGS:  2D and tomosynthesis images were reviewed at 1 mm slice thickness.      Density:  The breasts are almost entirely fatty.      No suspicious masses or calcifications are identified.      Impression: No mammographic evidence of malignancy.      BI-RADS CATEGORY:  BI-RADS Category:  1 Negative.  Recommendation:  Annual Screening.  Recommended Date:  1 Year.  Laterality:  Bilateral.              For any future breast imaging appointments, please call [phone]  ([phone]).          MACRO:  None      Signed by: Carlyn Harris 5/24/2025 9:42 AM  Dictation workstation:   [id number]           Assessment & Plan  1. Hypertension.  She will restart losartan therapy. A low-salt diet and regular exercise regimen are recommended.    2. Hyperlipidemia.  Atorvastatin therapy will be reinitiated. Lipid profile will be reassessed in 1 month.    3. Diabetes.  Hemoglobin A1c was recorded at 7.0. A referral to a clinical pharmacist for further evaluation has been made. She will be started on metformin temporarily to qualify for diabetes injections, but she can discontinue it if it causes stomach upset.    4. Abdominal pain.  Pantoprazole prescription will be renewed. An appointment for EGD is scheduled for Wednesday.    5. Post-cholecystectomy status.  She is recovering well.    6. Health maintenance.  Colonoscopy has been completed. All immunizations have been declined. She will arrange for eye  and dental appointments. Fasting blood work and urine tests will be done in 1 month.    7. Tobacco abuse.  She has declined medications or referral to a smoking cessation class. Counseling has been provided.    8. Elevated white blood cell count.  A recheck of the white blood cell count will be conducted.    9. Depression/anxiety.  Symptoms are stable without medication. She has declined medication at this time.    10. Anemia.  Hematocrit levels will be reassessed.    11. Gastroesophageal reflux disease.  Symptoms are stable. Follow-up with GI is planned.    Follow-up  The patient will follow up in 2 months.          Jorje Kapadia MD       This medical note was created with the assistance of artificial intelligence (AI) for documentation purposes. The content has been reviewed and confirmed by the healthcare provider for accuracy and completeness. Patient consented to the use of audio recording and use of AI during their visit.

## 2025-06-16 NOTE — PATIENT INSTRUCTIONS
Please take medication as prescribed.  Obtain fasting blood work and urine in 1 month.  Follow-up in 2 months.  Diet and exercise.  You will be notified about pharmacy team appointment.

## 2025-06-17 DIAGNOSIS — K63.3 INTESTINAL ULCERATION: ICD-10-CM

## 2025-06-17 RX ORDER — PANTOPRAZOLE SODIUM 40 MG/1
TABLET, DELAYED RELEASE ORAL
Qty: 90 TABLET | Refills: 3 | Status: SHIPPED | OUTPATIENT
Start: 2025-06-17 | End: 2025-06-17

## 2025-06-17 RX ORDER — PANTOPRAZOLE SODIUM 40 MG/1
TABLET, DELAYED RELEASE ORAL
Qty: 90 TABLET | Refills: 3 | Status: SHIPPED | OUTPATIENT
Start: 2025-06-17

## 2025-06-17 NOTE — TELEPHONE ENCOUNTER
Pt states she was not able to get the pantoprazole filled states pharmacy told her there are two ways that this prescription was ordered. pt needs someone to notify pharmacy that she should be taking the I pill day 30 days 40mg order instead.  States she has no medication and experiencing  stomach pains

## 2025-06-18 ENCOUNTER — APPOINTMENT (OUTPATIENT)
Dept: GASTROENTEROLOGY | Facility: EXTERNAL LOCATION | Age: 55
End: 2025-06-18
Payer: COMMERCIAL

## 2025-06-18 DIAGNOSIS — Z98.84 BARIATRIC SURGERY STATUS: ICD-10-CM

## 2025-06-18 DIAGNOSIS — K28.3 ACUTE GASTROJEJUNAL ANASTOMOTIC ULCER: Primary | ICD-10-CM

## 2025-06-18 DIAGNOSIS — K44.9 ESOPHAGEAL HIATAL HERNIA: ICD-10-CM

## 2025-06-18 PROCEDURE — 3051F HG A1C>EQUAL 7.0%<8.0%: CPT | Performed by: STUDENT IN AN ORGANIZED HEALTH CARE EDUCATION/TRAINING PROGRAM

## 2025-06-18 PROCEDURE — 4010F ACE/ARB THERAPY RXD/TAKEN: CPT | Performed by: STUDENT IN AN ORGANIZED HEALTH CARE EDUCATION/TRAINING PROGRAM

## 2025-06-18 PROCEDURE — 43239 EGD BIOPSY SINGLE/MULTIPLE: CPT | Performed by: STUDENT IN AN ORGANIZED HEALTH CARE EDUCATION/TRAINING PROGRAM

## 2025-06-18 RX ORDER — OMEPRAZOLE 40 MG/1
40 CAPSULE, DELAYED RELEASE ORAL DAILY
Qty: 30 CAPSULE | Refills: 11 | Status: SHIPPED | OUTPATIENT
Start: 2025-06-18 | End: 2026-06-18

## 2025-07-15 ENCOUNTER — APPOINTMENT (OUTPATIENT)
Dept: GASTROENTEROLOGY | Facility: CLINIC | Age: 55
End: 2025-07-15
Payer: COMMERCIAL

## 2025-07-18 DIAGNOSIS — N30.00 ACUTE CYSTITIS WITHOUT HEMATURIA: ICD-10-CM

## 2025-07-18 DIAGNOSIS — E11.9 DIABETES MELLITUS TYPE 2, DIET-CONTROLLED: Primary | ICD-10-CM

## 2025-07-18 LAB
25(OH)D3+25(OH)D2 SERPL-MCNC: 37 NG/ML (ref 30–100)
ALBUMIN SERPL-MCNC: 4.4 G/DL (ref 3.6–5.1)
ALP SERPL-CCNC: 110 U/L (ref 37–153)
ALT SERPL-CCNC: 13 U/L (ref 6–29)
ANION GAP SERPL CALCULATED.4IONS-SCNC: 8 MMOL/L (CALC) (ref 7–17)
APPEARANCE UR: ABNORMAL
AST SERPL-CCNC: 16 U/L (ref 10–35)
BACTERIA #/AREA URNS HPF: ABNORMAL /HPF
BASOPHILS # BLD AUTO: 81 CELLS/UL (ref 0–200)
BASOPHILS NFR BLD AUTO: 0.8 %
BILIRUB SERPL-MCNC: 0.6 MG/DL (ref 0.2–1.2)
BILIRUB UR QL STRIP: ABNORMAL
BUN SERPL-MCNC: 15 MG/DL (ref 7–25)
CALCIUM SERPL-MCNC: 9.2 MG/DL (ref 8.6–10.4)
CHLORIDE SERPL-SCNC: 106 MMOL/L (ref 98–110)
CHOLEST SERPL-MCNC: 140 MG/DL
CHOLEST/HDLC SERPL: 2.5 (CALC)
CO2 SERPL-SCNC: 27 MMOL/L (ref 20–32)
COLOR UR: ABNORMAL
CREAT SERPL-MCNC: 0.73 MG/DL (ref 0.5–1.03)
EGFRCR SERPLBLD CKD-EPI 2021: 98 ML/MIN/1.73M2
EOSINOPHIL # BLD AUTO: 242 CELLS/UL (ref 15–500)
EOSINOPHIL NFR BLD AUTO: 2.4 %
ERYTHROCYTE [DISTWIDTH] IN BLOOD BY AUTOMATED COUNT: 14.7 % (ref 11–15)
EST. AVERAGE GLUCOSE BLD GHB EST-MCNC: 143 MG/DL
EST. AVERAGE GLUCOSE BLD GHB EST-SCNC: 7.9 MMOL/L
GLUCOSE SERPL-MCNC: 113 MG/DL (ref 65–99)
GLUCOSE UR QL STRIP: NEGATIVE
HBA1C MFR BLD: 6.6 %
HCT VFR BLD AUTO: 43.1 % (ref 35–45)
HDLC SERPL-MCNC: 56 MG/DL
HGB BLD-MCNC: 13.6 G/DL (ref 11.7–15.5)
HGB UR QL STRIP: NEGATIVE
HYALINE CASTS #/AREA URNS LPF: ABNORMAL /LPF
KETONES UR QL STRIP: ABNORMAL
LDLC SERPL CALC-MCNC: 63 MG/DL (CALC)
LEUKOCYTE ESTERASE UR QL STRIP: ABNORMAL
LYMPHOCYTES # BLD AUTO: 3687 CELLS/UL (ref 850–3900)
LYMPHOCYTES NFR BLD AUTO: 36.5 %
MCH RBC QN AUTO: 31 PG (ref 27–33)
MCHC RBC AUTO-ENTMCNC: 31.6 G/DL (ref 32–36)
MCV RBC AUTO: 98.2 FL (ref 80–100)
MONOCYTES # BLD AUTO: 707 CELLS/UL (ref 200–950)
MONOCYTES NFR BLD AUTO: 7 %
NEUTROPHILS # BLD AUTO: 5383 CELLS/UL (ref 1500–7800)
NEUTROPHILS NFR BLD AUTO: 53.3 %
NITRITE UR QL STRIP: NEGATIVE
NONHDLC SERPL-MCNC: 84 MG/DL (CALC)
PH UR STRIP: 5.5 [PH] (ref 5–8)
PLATELET # BLD AUTO: 215 THOUSAND/UL (ref 140–400)
PMV BLD REES-ECKER: 13 FL (ref 7.5–12.5)
POTASSIUM SERPL-SCNC: 4.3 MMOL/L (ref 3.5–5.3)
PROT SERPL-MCNC: 7 G/DL (ref 6.1–8.1)
PROT UR QL STRIP: ABNORMAL
RBC # BLD AUTO: 4.39 MILLION/UL (ref 3.8–5.1)
RBC #/AREA URNS HPF: ABNORMAL /HPF
SERVICE CMNT-IMP: ABNORMAL
SODIUM SERPL-SCNC: 141 MMOL/L (ref 135–146)
SP GR UR STRIP: 1.03 (ref 1–1.03)
SQUAMOUS #/AREA URNS HPF: ABNORMAL /HPF
TRIGL SERPL-MCNC: 120 MG/DL
TSH SERPL-ACNC: 1.63 MIU/L
URATE SERPL-MCNC: 5.1 MG/DL (ref 2.5–7)
WBC # BLD AUTO: 10.1 THOUSAND/UL (ref 3.8–10.8)
WBC #/AREA URNS HPF: ABNORMAL /HPF
YEAST #/AREA URNS HPF: ABNORMAL /HPF

## 2025-07-18 NOTE — PROGRESS NOTES
Subjective   Patient ID: Yuni Jordan is a 55 y.o. female who presents for No chief complaint on file..  History of Present Illness        PMHx, FHx, Social Hx, Surg Hx personally reviewed at this appointment. No pertinent findings and/or changes from prior (if applicable).    ROS: Unless specified above, pt denies wt gain/loss f/c HA LoC CP SOB NVDC. See HPI above, and scanned sheet (if applicable). All other systems are reviewed and are without complaint.     Objective     There were no vitals taken for this visit.     Physical Exam        Lab Results   Component Value Date    WBC 10.1 07/17/2025    HGB 13.6 07/17/2025    HCT 43.1 07/17/2025     07/17/2025    CHOL 140 07/17/2025    TRIG 120 07/17/2025    HDL 56 07/17/2025    ALT 13 07/17/2025    AST 16 07/17/2025     07/17/2025    K 4.3 07/17/2025     07/17/2025    CREATININE 0.73 07/17/2025    BUN 15 07/17/2025    CO2 27 07/17/2025    TSH 1.63 07/17/2025    INR 1.6 (H) 04/25/2025    HGBA1C 6.6 (H) 07/17/2025     par     Current Outpatient Medications   Medication Instructions    albuterol (ProAir HFA) 90 mcg/actuation inhaler 1-2 puffs    atorvastatin (LIPITOR) 20 mg, oral, Daily    losartan (COZAAR) 50 mg, oral, Daily    metFORMIN (GLUCOPHAGE) 500 mg, oral, 2 times daily (morning and late afternoon)    multivitamin tablet 1 tablet, Daily    multivitamin with minerals tablet 1 tablet, oral, Daily    omeprazole (PRILOSEC) 40 mg, oral, Daily, Do not crush or chew.    pantoprazole (ProtoNix) 40 mg EC tablet Take 1 tablet (40 mg) by mouth 2 times a day before meals for 30 days, THEN 1 tablet (40 mg) once daily in the morning. Take before meals. Do not crush, chew, or split.    pantoprazole (ProtoNix) 40 mg EC tablet TAKE 1 TABLET BY MOUTH TWICE A DAY BEFORE MEALS X 30 DAYS, THEN TAKE 1 TAB EVERY MORNING BEFORE MEALS. DO NOT CRUSH/CHEW/SPLIT        BI mammo bilateral screening tomosynthesis  Narrative: Interpreted By:  Carlyn Harris,   STUDY:  BI  MAMMO BILATERAL SCREENING TOMOSYNTHESIS;  5/21/2025 4:29 pm      ACCESSION NUMBER(S):  [id number]      ORDERING CLINICIAN:  JORJE GRACE      INDICATION:  Screening.      ,Z12.31 Encounter for screening mammogram for malignant neoplasm of  breast      COMPARISON:  10/10/2023, 03/04/2021, 04/30/2018      FINDINGS:  2D and tomosynthesis images were reviewed at 1 mm slice thickness.      Density:  The breasts are almost entirely fatty.      No suspicious masses or calcifications are identified.      Impression: No mammographic evidence of malignancy.      BI-RADS CATEGORY:  BI-RADS Category:  1 Negative.  Recommendation:  Annual Screening.  Recommended Date:  1 Year.  Laterality:  Bilateral.              For any future breast imaging appointments, please call [phone]).          MACRO:  None      Signed by: Carlyn Harris 5/24/2025 9:42 AM  Dictation workstation:   [id number]           Assessment & Plan            Jorje Grace MD       This medical note was created with the assistance of artificial intelligence (AI) for documentation purposes. The content has been reviewed and confirmed by the healthcare provider for accuracy and completeness. Patient consented to the use of audio recording and use of AI during their visit.

## 2025-07-18 NOTE — PROGRESS NOTES
Subjective   Patient ID: Yuni Jordan is a 55 y.o. female who presents for No chief complaint on file..  History of Present Illness        PMHx, FHx, Social Hx, Surg Hx personally reviewed at this appointment. No pertinent findings and/or changes from prior (if applicable).    ROS: Unless specified above, pt denies wt gain/loss f/c HA LoC CP SOB NVDC. See HPI above, and scanned sheet (if applicable). All other systems are reviewed and are without complaint.     Objective     There were no vitals taken for this visit.     Physical Exam        Lab Results   Component Value Date    WBC 10.1 07/17/2025    HGB 13.6 07/17/2025    HCT 43.1 07/17/2025     07/17/2025    CHOL 140 07/17/2025    TRIG 120 07/17/2025    HDL 56 07/17/2025    ALT 13 07/17/2025    AST 16 07/17/2025     07/17/2025    K 4.3 07/17/2025     07/17/2025    CREATININE 0.73 07/17/2025    BUN 15 07/17/2025    CO2 27 07/17/2025    TSH 1.63 07/17/2025    INR 1.6 (H) 04/25/2025    HGBA1C 6.6 (H) 07/17/2025     par     Current Outpatient Medications   Medication Instructions    albuterol (ProAir HFA) 90 mcg/actuation inhaler 1-2 puffs    atorvastatin (LIPITOR) 20 mg, oral, Daily    losartan (COZAAR) 50 mg, oral, Daily    metFORMIN (GLUCOPHAGE) 500 mg, oral, 2 times daily (morning and late afternoon)    multivitamin tablet 1 tablet, Daily    multivitamin with minerals tablet 1 tablet, oral, Daily    omeprazole (PRILOSEC) 40 mg, oral, Daily, Do not crush or chew.    pantoprazole (ProtoNix) 40 mg EC tablet Take 1 tablet (40 mg) by mouth 2 times a day before meals for 30 days, THEN 1 tablet (40 mg) once daily in the morning. Take before meals. Do not crush, chew, or split.    pantoprazole (ProtoNix) 40 mg EC tablet TAKE 1 TABLET BY MOUTH TWICE A DAY BEFORE MEALS X 30 DAYS, THEN TAKE 1 TAB EVERY MORNING BEFORE MEALS. DO NOT CRUSH/CHEW/SPLIT        BI mammo bilateral screening tomosynthesis  Narrative: Interpreted By:  Carlyn Harris,   STUDY:  BI  MAMMO BILATERAL SCREENING TOMOSYNTHESIS;  5/21/2025 4:29 pm      ACCESSION NUMBER(S):  [id number]      ORDERING CLINICIAN:  JORJE GRACE      INDICATION:  Screening.      ,Z12.31 Encounter for screening mammogram for malignant neoplasm of  breast      COMPARISON:  10/10/2023, 03/04/2021, 04/30/2018      FINDINGS:  2D and tomosynthesis images were reviewed at 1 mm slice thickness.      Density:  The breasts are almost entirely fatty.      No suspicious masses or calcifications are identified.      Impression: No mammographic evidence of malignancy.      BI-RADS CATEGORY:  BI-RADS Category:  1 Negative.  Recommendation:  Annual Screening.  Recommended Date:  1 Year.  Laterality:  Bilateral.              For any future breast imaging appointments, please call [phone]).          MACRO:  None      Signed by: Carlyn Harris 5/24/2025 9:42 AM  Dictation workstation:   [id number]           Assessment & Plan            Jorje Grace MD       This medical note was created with the assistance of artificial intelligence (AI) for documentation purposes. The content has been reviewed and confirmed by the healthcare provider for accuracy and completeness. Patient consented to the use of audio recording and use of AI during their visit.

## 2025-08-18 ENCOUNTER — APPOINTMENT (OUTPATIENT)
Dept: PRIMARY CARE | Facility: CLINIC | Age: 55
End: 2025-08-18
Payer: COMMERCIAL

## 2025-11-18 ENCOUNTER — APPOINTMENT (OUTPATIENT)
Dept: PRIMARY CARE | Facility: CLINIC | Age: 55
End: 2025-11-18
Payer: COMMERCIAL

## (undated) DEVICE — APPLICATOR, ENDOSCOPIC, F/SURGICEL POWDER

## (undated) DEVICE — SYSTEM, TROCAR LAP, 5X100MM, SHIELD BLADED, KII ADVANCED FIX ABDOMINAL

## (undated) DEVICE — SHEAR, W/UNIPOLAR CAUTERY, ENDOSHEAR, 5 MM

## (undated) DEVICE — PUMP, STRYKERFLOW 2 & HANDPIECE W/10FT. IRRIGATION TUBING

## (undated) DEVICE — TOWEL, SURGICAL, NEURO, O/R, 16 X 26, BLUE, STERILE

## (undated) DEVICE — CORD, MONOPOLAR, HIGH FREQUENCY, W/8MM PLUG F/VALLEYLAB, 8FT/244CM, STRL

## (undated) DEVICE — SUTURE, VICRYL, 0, 27 IN, UR-6, VIOLET

## (undated) DEVICE — RETRIEVAL SYSTEM, MONARCH, 10MM DISP ENDOSCOPIC

## (undated) DEVICE — CANNULA, KII ADVANCED FIXATION, 5X100MM W/SEAL

## (undated) DEVICE — SUTURE, MONOCRYL, 4-0, 18 IN, PS2, UNDYED

## (undated) DEVICE — GLOVE, SURGICAL, PROTEXIS PI W/NEU-THERA, 7.5, PF, LF

## (undated) DEVICE — SCOPE WARMER, LAPAROSCOPE, BAG ONLY, LF

## (undated) DEVICE — TUBE SET, PNEUMOLAR HEATED, SMOKE EVACU, HIGH-FLOW

## (undated) DEVICE — ELECTRODE, ELECTROSURGICAL, BLADE, INSULATED, ENT/IMA, STERILE

## (undated) DEVICE — Device

## (undated) DEVICE — HEMOSTAT, SURGICEL POWDER 3.0GRAMS

## (undated) DEVICE — GLOVE, SURGICAL, PROTEXIS PI BLUE W/NEUTHERA, 8.0, PF, LF

## (undated) DEVICE — ADHESIVE, SKIN, DERMABOND ADVANCED, 15CM, PEN-STYLE

## (undated) DEVICE — TROCAR SYSTEM, BALLOON, KII GELPORT, 12 X 100MM

## (undated) DEVICE — TIP,  ELECTRODE COATED INSULATED, EXTENDED, LF

## (undated) DEVICE — CLIP, LIGATING, HEM-O-LOCK, MEDIUM/LARGE, LF, GREEN

## (undated) DEVICE — SYRINGE, 20 CC, LUER LOCK, MONOJECT, W/O CAP, LF